# Patient Record
Sex: MALE | Race: WHITE | Employment: OTHER | URBAN - METROPOLITAN AREA
[De-identification: names, ages, dates, MRNs, and addresses within clinical notes are randomized per-mention and may not be internally consistent; named-entity substitution may affect disease eponyms.]

---

## 2017-07-06 ENCOUNTER — TELEPHONE (OUTPATIENT)
Dept: FAMILY MEDICINE | Facility: CLINIC | Age: 82
End: 2017-07-06

## 2017-07-07 ENCOUNTER — OFFICE VISIT (OUTPATIENT)
Dept: FAMILY MEDICINE | Facility: CLINIC | Age: 82
End: 2017-07-07
Payer: MEDICARE

## 2017-07-07 VITALS
HEART RATE: 80 BPM | TEMPERATURE: 97.6 F | OXYGEN SATURATION: 99 % | WEIGHT: 204 LBS | BODY MASS INDEX: 27.63 KG/M2 | HEIGHT: 72 IN | SYSTOLIC BLOOD PRESSURE: 144 MMHG | DIASTOLIC BLOOD PRESSURE: 104 MMHG

## 2017-07-07 DIAGNOSIS — R26.81 UNSTEADY GAIT: Primary | ICD-10-CM

## 2017-07-07 DIAGNOSIS — G47.33 OSA (OBSTRUCTIVE SLEEP APNEA): ICD-10-CM

## 2017-07-07 DIAGNOSIS — M62.81 GENERALIZED MUSCLE WEAKNESS: ICD-10-CM

## 2017-07-07 DIAGNOSIS — H53.2 DIPLOPIA: ICD-10-CM

## 2017-07-07 PROBLEM — I48.20 CHRONIC ATRIAL FIBRILLATION (H): Status: ACTIVE | Noted: 2017-07-07

## 2017-07-07 LAB
ANION GAP SERPL CALCULATED.3IONS-SCNC: 7 MMOL/L (ref 3–14)
BUN SERPL-MCNC: 15 MG/DL (ref 7–30)
CALCIUM SERPL-MCNC: 9.4 MG/DL (ref 8.5–10.1)
CHLORIDE SERPL-SCNC: 105 MMOL/L (ref 94–109)
CO2 SERPL-SCNC: 27 MMOL/L (ref 20–32)
CREAT SERPL-MCNC: 0.85 MG/DL (ref 0.66–1.25)
DIFFERENTIAL METHOD BLD: ABNORMAL
ERYTHROCYTE [DISTWIDTH] IN BLOOD BY AUTOMATED COUNT: 14.9 % (ref 10–15)
GFR SERPL CREATININE-BSD FRML MDRD: 86 ML/MIN/1.7M2
GLUCOSE SERPL-MCNC: 82 MG/DL (ref 70–99)
HCT VFR BLD AUTO: 44.9 % (ref 40–53)
HGB BLD-MCNC: 15 G/DL (ref 13.3–17.7)
LYMPHOCYTES # BLD AUTO: 7.2 10E9/L (ref 0.8–5.3)
LYMPHOCYTES NFR BLD AUTO: 54 %
MCH RBC QN AUTO: 32.5 PG (ref 26.5–33)
MCHC RBC AUTO-ENTMCNC: 33.4 G/DL (ref 31.5–36.5)
MCV RBC AUTO: 97 FL (ref 78–100)
MONOCYTES # BLD AUTO: 0.9 10E9/L (ref 0–1.3)
MONOCYTES NFR BLD AUTO: 7 %
NEUTROPHILS # BLD AUTO: 5.1 10E9/L (ref 1.6–8.3)
NEUTROPHILS NFR BLD AUTO: 39 %
PLATELET # BLD AUTO: 172 10E9/L (ref 150–450)
PLATELET # BLD EST: NORMAL 10*3/UL
POTASSIUM SERPL-SCNC: 4.4 MMOL/L (ref 3.4–5.3)
RBC # BLD AUTO: 4.61 10E12/L (ref 4.4–5.9)
SODIUM SERPL-SCNC: 139 MMOL/L (ref 133–144)
VIT B12 SERPL-MCNC: 199 PG/ML (ref 193–986)
WBC # BLD AUTO: 13.2 10E9/L (ref 4–11)

## 2017-07-07 PROCEDURE — 99203 OFFICE O/P NEW LOW 30 MIN: CPT | Performed by: INTERNAL MEDICINE

## 2017-07-07 PROCEDURE — 86780 TREPONEMA PALLIDUM: CPT | Performed by: INTERNAL MEDICINE

## 2017-07-07 PROCEDURE — 86255 FLUORESCENT ANTIBODY SCREEN: CPT | Mod: 90 | Performed by: INTERNAL MEDICINE

## 2017-07-07 PROCEDURE — 36415 COLL VENOUS BLD VENIPUNCTURE: CPT | Performed by: INTERNAL MEDICINE

## 2017-07-07 PROCEDURE — 82607 VITAMIN B-12: CPT | Performed by: INTERNAL MEDICINE

## 2017-07-07 PROCEDURE — 83516 IMMUNOASSAY NONANTIBODY: CPT | Mod: 59 | Performed by: INTERNAL MEDICINE

## 2017-07-07 PROCEDURE — 99000 SPECIMEN HANDLING OFFICE-LAB: CPT | Performed by: INTERNAL MEDICINE

## 2017-07-07 PROCEDURE — 85025 COMPLETE CBC W/AUTO DIFF WBC: CPT | Performed by: INTERNAL MEDICINE

## 2017-07-07 PROCEDURE — 83519 RIA NONANTIBODY: CPT | Mod: 90 | Performed by: INTERNAL MEDICINE

## 2017-07-07 PROCEDURE — 80048 BASIC METABOLIC PNL TOTAL CA: CPT | Performed by: INTERNAL MEDICINE

## 2017-07-07 RX ORDER — METOPROLOL TARTRATE 100 MG
TABLET ORAL
COMMUNITY
Start: 2016-12-17

## 2017-07-07 RX ORDER — DABIGATRAN ETEXILATE 150 MG/1
150 CAPSULE ORAL
COMMUNITY
Start: 2011-11-21 | End: 2017-11-08

## 2017-07-07 RX ORDER — ACETAMINOPHEN 325 MG/1
650 TABLET ORAL PRN
COMMUNITY
End: 2018-07-19

## 2017-07-07 RX ORDER — AMOXICILLIN 250 MG
4 CAPSULE ORAL DAILY PRN
COMMUNITY
End: 2018-09-18

## 2017-07-07 RX ORDER — AMOXICILLIN 250 MG/1
250 CAPSULE ORAL DAILY
COMMUNITY
End: 2019-06-19

## 2017-07-07 NOTE — PROGRESS NOTES
SUBJECTIVE:                                                    Bijan Daniel is a 82 year old male who presents to clinic today for the following health issues:  1.  Moved down from [place]  Dr. Pearce  Dizziness.  Unsteady in the gait.  Use the familia and walker   No physical therapy   No head injury or    No diabetes       [facility]  [facility] replacement in there ight shoulder could not determine the nature of the infections   Ate away bone   4 inches of the bone resected.    Amoxicillin infections in the shoulder and placed for amoxicillin    Walks slow   Double vision occasionally   Happens   Eye doctor seen  [place].   Went to retinal specialist       New Patient/Transfer of Care  Medication Followup    Taking Medication as prescribed: yes    Side Effects:  None    Medication Helping Symptoms:  yes           Problem list and histories reviewed & adjusted, as indicated.  Additional history: as documented    Patient Active Problem List   Diagnosis     Chronic atrial fibrillation (H)     MARI (obstructive sleep apnea)     Past Surgical History:   Procedure Laterality Date     APPENDECTOMY Right      AS TOTAL HIP ARTHROPLASTY Right 2008     AS TOTAL HIP ARTHROPLASTY Left 2009     C ANESTH,SHOULDER REPLACEMENT       removal shoulder arthroplasty         Social History   Substance Use Topics     Smoking status: Former Smoker     Smokeless tobacco: Not on file     Alcohol use Yes      Comment: 14 or less     History reviewed. No pertinent family history.      Current Outpatient Prescriptions   Medication Sig Dispense Refill     acetaminophen (TYLENOL) 325 MG tablet Take 650 mg by mouth as needed       amoxicillin (AMOXIL) 250 MG capsule Take 250 mg by mouth       calcium carb 1250 mg, 500 mg [place],/vitamin D 200 units (OSCAL WITH D) 500-200 MG-UNIT per tablet        dabigatran ANTICOAGULANT (PRADAXA) 150 MG capsule Take 150 mg by mouth       metoprolol (LOPRESSOR) 100 MG tablet TAKE 1 TABLET ORALLY  "TWICE DAILY       omeprazole (PRILOSEC) 20 MG CR capsule Take 20 mg by mouth       senna-docusate (SENOKOT-S;PERICOLACE) 8.6-50 MG per tablet        No Known Allergies  Recent Labs   Lab Test  09/01/11   0647  08/31/11   0910  07/17/09   0753   CR  0.81  0.91   --    GFRESTIMATED  >90  81   --    GFRESTBLACK  >90  >90   --    POTASSIUM  4.1  4.0  4.2   TSH   --    --   3.61        Reviewed and updated as needed this visit by clinical staff  Tobacco  Allergies  Meds  Med Hx  Surg Hx  Fam Hx  Soc Hx      Reviewed and updated as needed this visit by Provider         ROS:  Constitutional, HEENT, cardiovascular, pulmonary, gi and gu systems are negative, except as otherwise noted.    OBJECTIVE:     BP (!) 144/104 (BP Location: Right arm, Patient Position: Chair, Cuff Size: Adult Regular)  Pulse 80  Temp 97.6  F (36.4  C) (Oral)  Ht 5' 11.85\" (1.825 m)  Wt 204 lb (92.5 kg)  SpO2 99%  BMI 27.78 kg/m2  Body mass index is 27.78 kg/(m^2).  GENERAL: healthy, alert and no distress  NECK: no adenopathy, no asymmetry, masses, or scars and thyroid normal to palpation  RESP: lungs clear to auscultation - no rales, rhonchi or wheezes  CV: regular rate and rhythm, normal S1 S2, no S3 or S4, no murmur, click or rub, no peripheral edema and peripheral pulses strong  ABDOMEN: soft, nontender, no hepatosplenomegaly, no masses and bowel sounds normal  MS: no gross musculoskeletal defects noted, no edema    Diagnostic Test Results:  Results for orders placed or performed in visit on 09/03/11   CRP inflammation   Result Value Ref Range    CRP Inflammation 152.6 (H) 0.0 - 8.0 mg/L   INR   Result Value Ref Range    INR 1.89 (H) 0.86 - 1.14       ASSESSMENT/PLAN:       ICD-10-CM    1. Unsteady gait R26.81 Basic metabolic panel     Anti Treponema     Vitamin B12     CBC with platelets and differential     NEUROLOGY ADULT REFERRAL   2. MARI (obstructive sleep apnea) G47.33    3. Diplopia H53.2 MR Brain w/o Contrast   4. Generalized " muscle weakness M62.81 ACETYLCHOLINE RECEPTOR BINDING     STRIATED MUSCLE ANTIBODY IGG     ACETYLCHOLINE MODULATING ANTIBODY     ACETYLCHOLINE RECEPTOR BLOCKING ISIAH       RE: Bijan ABERNATHY Fredy  : 1934     Patient with a diagnosis of MARI (obstructive sleep apnea) requiring the use of a CPAP machine for treatment.   Please provide him with new CPAP supplies as necessary.     Oc Randall MD  [facility]

## 2017-07-07 NOTE — LETTER
[facility]  [address] [place]  [address]  [phone]        July 11, 2017    Bijan MICHELA Daniel  [address] [place] [address] [place]  [address]  [facility] [address]        Dear Bijan,    Labs look good.  No signs of muscle nerve problem.   Awaiting the imaging and the neurology assessment     Results for orders placed or performed in visit on 07/07/17   Basic metabolic panel   Result Value Ref Range    Sodium 139 133 - 144 mmol/L    Potassium 4.4 3.4 - 5.3 mmol/L    Chloride 105 94 - 109 mmol/L    Carbon Dioxide 27 20 - 32 mmol/L    Anion Gap 7 3 - 14 mmol/L    Glucose 82 70 - 99 mg/dL    Urea Nitrogen 15 7 - 30 mg/dL    Creatinine 0.85 0.66 - 1.25 mg/dL    GFR Estimate 86 >60 mL/min/1.7m2    GFR Estimate If Black >90   GFR Calc   >60 mL/min/1.7m2    Calcium 9.4 8.5 - 10.1 mg/dL   Anti Treponema   Result Value Ref Range    Treponema pallidum Antibody Negative NEG   Vitamin B12   Result Value Ref Range    Vitamin B12 199 193 - 986 pg/mL   CBC with platelets and differential   Result Value Ref Range    WBC 13.2 (H) 4.0 - 11.0 10e9/L    RBC Count 4.61 4.4 - 5.9 10e12/L    Hemoglobin 15.0 13.3 - 17.7 g/dL    Hematocrit 44.9 40.0 - 53.0 %    MCV 97 78 - 100 fl    MCH 32.5 26.5 - 33.0 pg    MCHC 33.4 31.5 - 36.5 g/dL    RDW 14.9 10.0 - 15.0 %    Platelet Count 172 150 - 450 10e9/L    % Neutrophils 39.0 %    % Lymphocytes 54.0 %    % Monocytes 7.0 %    Absolute Neutrophil 5.1 1.6 - 8.3 10e9/L    Absolute Lymphocytes 7.2 (H) 0.8 - 5.3 10e9/L    Absolute Monocytes 0.9 0.0 - 1.3 10e9/L    Platelet Estimate Normal     Diff Method Manual Differential    ACETYLCHOLINE RECEPTOR BINDING   Result Value Ref Range    AcetChol Binding Rakel 0.0    STRIATED MUSCLE ANTIBODY IGG   Result Value Ref Range    Striated Muscle Antibody IgG       <1:40  Reference range: <1:40  (Note)  Striated Muscle Antibodies, IgG are not detected. No  further testing will be performed.  INTERPRETIVE DATA:   Striated Muscle Antibodies, IgG Screen  In the presence of acetylcholine receptor (AChR) antibody,  striated muscle antibodies, which bind in a  cross-striational pattern to skeletal and heart muscle  tissue sections, are associated with late-onset myasthenia  gravis (MG). Striated muscle antibodies recognize epitopes  on three major muscle proteins, including: titin, ryanodine  receptor (RyR) and Kv1.4 (an alpha subunit of voltage-gated  potassium channel [VGKC]). Isolated cases of striated  muscle antibodies may be seen in patients with certain  autoimmune diseases, rheumatic fever, myocardial  infarction, and following some cardiotomy procedures.  Test developed and characteristics determined by [facility]. See Compliance Statement A: [employer]  Performed by [facility],  [address] [place] [address] [phone]   w.[employer], Nelson Thakkar MD, Lab. Director     ACETYLCHOLINE RECEPTOR BLOCKING ISIAH   Result Value Ref Range    AcetChol Block Isiah 15        If you have any questions please call the clinic at [phone].    Sincerely,    Oc ROJOL

## 2017-07-07 NOTE — NURSING NOTE
"Chief Complaint   Patient presents with     Establish Care     Recheck Medication     Health Maintenance     fall risk, PCV 13       Initial BP (!) 144/104 (BP Location: Right arm, Patient Position: Chair, Cuff Size: Adult Regular)  Pulse 80  Temp 97.6  F (36.4  C) (Oral)  Ht 5' 11.85\" (1.825 m)  Wt 204 lb (92.5 kg)  SpO2 99%  BMI 27.78 kg/m2 Estimated body mass index is 27.78 kg/(m^2) as calculated from the following:    Height as of this encounter: 5' 11.85\" (1.825 m).    Weight as of this encounter: 204 lb (92.5 kg).  Medication Reconciliation: complete   Jessica See YAMILE Gale      "

## 2017-07-07 NOTE — MR AVS SNAPSHOT
After Visit Summary   7/7/2017    Bijan Daniel    MRN: [id number]           Patient Information     Date Of Birth          8/12/1934        Visit Information        Provider Department      7/7/2017 11:40 AM Oc Randall MD [facility]        Today's Diagnoses     Unsteady gait    -  1    MARI (obstructive sleep apnea)        Diplopia        Generalized muscle weakness           Follow-ups after your visit        Additional Services     NEUROLOGY ADULT REFERRAL       Your provider has referred you to: [facility] [phone]   [email or web address]    Reason for Referral: Consult    Please be aware that coverage of these services is subject to the terms and limitations of your health insurance plan.  Call member services at your health plan with any benefit or coverage questions.      Please bring the following with you to your appointment:    (1) Any X-Rays, CTs or MRIs which have been performed.  Contact the facility where they were done to arrange for  prior to your scheduled appointment.    (2) List of current medications  (3) This referral request   (4) Any documents/labs given to you for this referral                  Future tests that were ordered for you today     Open Future Orders        Priority Expected Expires Ordered    MR Brain w/o Contrast Routine  7/7/2018 7/7/2017            Who to contact     If you have questions or need follow up information about today's clinic visit or your schedule please contact [facility] directly at [phone].  Normal or non-critical lab and imaging results will be communicated to you by MyChart, letter or phone within 4 business days after the clinic has received the results. If you do not hear from us within 7 days, please contact the clinic through MyChart or phone. If you have a critical or abnormal lab result, we will notify you by phone as soon as  "possible.  Submit refill requests through [employer] or call your pharmacy and they will forward the refill request to us. Please allow 3 business days for your refill to be completed.          Additional Information About Your Visit        [employer] Information     [employer] lets you send messages to your doctor, view your test results, renew your prescriptions, schedule appointments and more. To sign up, go to [email or web address] . Click on \"Log in\" on the left side of the screen, which will take you to the Welcome page. Then click on \"Sign up Now\" on the right side of the page.     You will be asked to enter the access code listed below, as well as some personal information. Please follow the directions to create your username and password.     Your access code is: [email or web address]  Expires: 10/5/2017 12:26 PM     Your access code will  in 90 days. If you need help or a new code, please call your [place] clinic or [phone].        Care EveryWhere ID     This is your Care EveryWhere ID. This could be used by other organizations to access your [place] medical records  [id number]        Your Vitals Were     Pulse Temperature Height Pulse Oximetry BMI (Body Mass Index)       80 97.6  F (36.4  C) (Oral) 5' 11.85\" (1.825 m) 99% 27.78 kg/m2        Blood Pressure from Last 3 Encounters:   17 (!) 144/104    Weight from Last 3 Encounters:   17 204 lb (92.5 kg)              We Performed the Following     ACETYLCHOLINE MODULATING ANTIBODY     ACETYLCHOLINE RECEPTOR BINDING     ACETYLCHOLINE RECEPTOR BLOCKING ISIAH     Anti Treponema     Basic metabolic panel     CBC with platelets and differential     NEUROLOGY ADULT REFERRAL     STRIATED MUSCLE ANTIBODY IGG     Vitamin B12        Primary Care Provider Office Phone # Fax #    Jumana Friedman [phone]       [address] [place] [address]        Equal Access to Services     RHONDA MCDONOUGH AH: Hadii " nael Meng, waroyer dixonadaha, qaybta kaalfranny christianoedvin, waxtom mirela edwardsfangalison samuel uday. So [facility] [phone].    ATENCIÓN: Si habla español, tiene a kwon disposición servicios gratuitos de asistencia lingüística. Cyndie al [phone].    We comply with applicable federal civil rights laws and [place] laws. We do not discriminate on the basis of race, color, national origin, age, disability sex, sexual orientation or gender identity.            Thank you!     Thank you for choosing [facility]  for your care. Our goal is always to provide you with excellent care. Hearing back from our patients is one way we can continue to improve our services. Please take a few minutes to complete the written survey that you may receive in the mail after your visit with us. Thank you!             Your Updated Medication List - Protect others around you: Learn how to safely use, store and throw away your medicines at www.disposemymeds.org.          This list is accurate as of: 7/7/17 12:26 PM.  Always use your most recent med list.                   Brand Name Dispense Instructions for use Diagnosis    acetaminophen 325 MG tablet    TYLENOL     Take 650 mg by mouth as needed        amoxicillin 250 MG capsule    AMOXIL     Take 250 mg by mouth        calcium carb 1250 mg (500 mg [place])/vitamin D 200 units 500-200 MG-UNIT per tablet    OSCAL with D          dabigatran ANTICOAGULANT 150 MG capsule    PRADAXA     Take 150 mg by mouth        metoprolol 100 MG tablet    LOPRESSOR     TAKE 1 TABLET ORALLY TWICE DAILY        omeprazole 20 MG CR capsule    priLOSEC     Take 20 mg by mouth        senna-docusate 8.6-50 MG per tablet    SENOKOT-S;PERICOLACE

## 2017-07-08 LAB
ACHR BIND AB SER-SCNC: 0 NMOL/L
STRIA MUS IGG SER QL IF: NORMAL
T PALLIDUM IGG+IGM SER QL: NEGATIVE

## 2017-07-10 LAB — ACHR BLOCK AB/ACHR TOTAL SFR SER: 15 %

## 2017-07-11 LAB — ACHR MOD AB/ACHR TOTAL SFR SER: 18 %

## 2017-07-13 ENCOUNTER — TELEPHONE (OUTPATIENT)
Dept: FAMILY MEDICINE | Facility: CLINIC | Age: 82
End: 2017-07-13

## 2017-07-13 NOTE — TELEPHONE ENCOUNTER
Forms received from: [employer]   Phone number listed: [phone]   Fax listed: [phone]  Date received: 7-13-17  Form description: CPap supplies  Once forms are completed, please return to [place] via fax.  Is patient requesting to be contacted when forms are completed: n/a  Form placed: provider desk  Mary Todd

## 2017-07-14 ENCOUNTER — RADIANT APPOINTMENT (OUTPATIENT)
Dept: MRI IMAGING | Facility: CLINIC | Age: 82
End: 2017-07-14
Attending: INTERNAL MEDICINE
Payer: MEDICARE

## 2017-07-14 DIAGNOSIS — H53.2 DIPLOPIA: ICD-10-CM

## 2017-07-14 PROCEDURE — 70551 MRI BRAIN STEM W/O DYE: CPT | Mod: TC

## 2017-07-25 ENCOUNTER — TELEPHONE (OUTPATIENT)
Dept: FAMILY MEDICINE | Facility: CLINIC | Age: 82
End: 2017-07-25

## 2017-07-25 NOTE — TELEPHONE ENCOUNTER
Reason for Call:  Other Letter    Detailed comments: [facility] is requesting letter that states patient needs/uses CPAP supplies.  Patient is not able to get supplies until this is done.  [facility] phone: [phone] Fax to [facility] is [phone]. Patient has been working with Isabel at [facility].    Phone Number Patient can be reached at: Home number on file [phone] (home) to reach patient.    Best Time: Any    Can we leave a detailed message on this number? YES    Call taken on 7/25/2017 at 3:40 PM by Tucker Grant

## 2017-07-25 NOTE — TELEPHONE ENCOUNTER
RN sees previous encounter for forms for CPAP from [facility], they need a note stating medical use.    Routed to PCP/covering pool.    Brandi Sim RN  [facility]

## 2017-07-25 NOTE — LETTER
[address] [address]  Phone: [phone]  Fax: [phone]    2017        Bijan Daniel  [address] [place] [address] [address]  [address]  [facility] [address]          To whom it may concern:    RE: Bijan ABERNATHY Fredy  : 1934    Patient with a diagnosis of MARI (obstructive sleep apnea) requiring the use of a CPAP machine for treatment.   Please provide him with new CPAP supplies as necessary.       Please contact me for questions or concerns.      Sincerely,        Grecia Vega PA-C

## 2017-07-26 NOTE — TELEPHONE ENCOUNTER
Cheri galeano from [facility] [place].  Medicare, patients insurance, will need what is in the letter to be addend to the (2-7-17 OV note.  Please fax the updated note to them at [phone].

## 2017-08-07 ENCOUNTER — TRANSFERRED RECORDS (OUTPATIENT)
Dept: HEALTH INFORMATION MANAGEMENT | Facility: CLINIC | Age: 82
End: 2017-08-07

## 2017-08-25 ENCOUNTER — TRANSFERRED RECORDS (OUTPATIENT)
Dept: HEALTH INFORMATION MANAGEMENT | Facility: CLINIC | Age: 82
End: 2017-08-25

## 2017-10-09 ENCOUNTER — TRANSFERRED RECORDS (OUTPATIENT)
Dept: HEALTH INFORMATION MANAGEMENT | Facility: CLINIC | Age: 82
End: 2017-10-09

## 2017-11-08 ENCOUNTER — OFFICE VISIT (OUTPATIENT)
Dept: FAMILY MEDICINE | Facility: CLINIC | Age: 82
End: 2017-11-08
Payer: MEDICARE

## 2017-11-08 VITALS
DIASTOLIC BLOOD PRESSURE: 90 MMHG | SYSTOLIC BLOOD PRESSURE: 134 MMHG | OXYGEN SATURATION: 96 % | HEART RATE: 89 BPM | TEMPERATURE: 96 F

## 2017-11-08 DIAGNOSIS — Z23 NEED FOR PROPHYLACTIC VACCINATION AND INOCULATION AGAINST INFLUENZA: ICD-10-CM

## 2017-11-08 DIAGNOSIS — F32.0 MILD MAJOR DEPRESSION (H): ICD-10-CM

## 2017-11-08 DIAGNOSIS — Z23 NEED FOR PNEUMOCOCCAL VACCINATION: Primary | ICD-10-CM

## 2017-11-08 DIAGNOSIS — E53.8 VITAMIN B12 DEFICIENCY (NON ANEMIC): ICD-10-CM

## 2017-11-08 PROCEDURE — 90662 IIV NO PRSV INCREASED AG IM: CPT | Performed by: INTERNAL MEDICINE

## 2017-11-08 PROCEDURE — 99214 OFFICE O/P EST MOD 30 MIN: CPT | Mod: 25 | Performed by: INTERNAL MEDICINE

## 2017-11-08 PROCEDURE — G0009 ADMIN PNEUMOCOCCAL VACCINE: HCPCS | Performed by: INTERNAL MEDICINE

## 2017-11-08 PROCEDURE — 90670 PCV13 VACCINE IM: CPT | Performed by: INTERNAL MEDICINE

## 2017-11-08 PROCEDURE — G0008 ADMIN INFLUENZA VIRUS VAC: HCPCS | Performed by: INTERNAL MEDICINE

## 2017-11-08 RX ORDER — VENLAFAXINE HYDROCHLORIDE 37.5 MG/1
37.5 CAPSULE, EXTENDED RELEASE ORAL DAILY
Qty: 90 CAPSULE | Refills: 3 | Status: SHIPPED | OUTPATIENT
Start: 2017-11-08 | End: 2017-12-20 | Stop reason: SINTOL

## 2017-11-08 ASSESSMENT — PATIENT HEALTH QUESTIONNAIRE - PHQ9: SUM OF ALL RESPONSES TO PHQ QUESTIONS 1-9: 7

## 2017-11-08 ASSESSMENT — PAIN SCALES - GENERAL: PAINLEVEL: NO PAIN (0)

## 2017-11-08 NOTE — NURSING NOTE
"Chief Complaint   Patient presents with     RECHECK     F/U from neurologist        Initial /90 (BP Location: Left arm, Patient Position: Chair, Cuff Size: Adult Regular)  Pulse 89  Temp 96  F (35.6  C) (Oral)  SpO2 96% Estimated body mass index is 27.78 kg/(m^2) as calculated from the following:    Height as of 7/7/17: 5' 11.85\" (1.825 m).    Weight as of 7/7/17: 204 lb (92.5 kg).  Medication Reconciliation: complete   Toña Cordero MA      "

## 2017-11-08 NOTE — NURSING NOTE
Prior to injection verified patient identity using patient's name and date of birth.  Toña Cordero MA    Per orders of Dr. Randall, injection of Flu and Pneumococcal 13 given by Toña Cordero. Patient instructed to remain in clinic for 15 minutes afterwards, and to report any adverse reaction to me immediately.  Toña Cordero MA

## 2017-11-08 NOTE — PROGRESS NOTES
SUBJECTIVE:   Bijan Daniel is a 83 year old male who presents to clinic today for the following health issues:    Follow up visit after neurology results.    ophthalmolgy and double vision is gone   Eyes get tired.    Working with dizziness and balance.      Is showing slow and steady improvement with physical therapy      Problem list and histories reviewed & adjusted, as indicated.  Additional history: as documented    Patient Active Problem List   Diagnosis     Chronic atrial fibrillation (H)     MARI (obstructive sleep apnea)     Past Surgical History:   Procedure Laterality Date     APPENDECTOMY Right      AS TOTAL HIP ARTHROPLASTY Right 2008     AS TOTAL HIP ARTHROPLASTY Left 2009     C ANESTH,SHOULDER REPLACEMENT       removal shoulder arthroplasty         Social History   Substance Use Topics     Smoking status: Former Smoker     Smokeless tobacco: Never Used     Alcohol use Yes      Comment: 14 or less     History reviewed. No pertinent family history.      Current Outpatient Prescriptions   Medication Sig Dispense Refill     Apixaban (ELIQUIS PO) Take 5 mg by mouth 2 times daily       venlafaxine (EFFEXOR-XR) 37.5 MG 24 hr capsule Take 1 capsule (37.5 mg) by mouth daily 90 capsule 3     acetaminophen (TYLENOL) 325 MG tablet Take 650 mg by mouth as needed       calcium carb 1250 mg, 500 mg [place],/vitamin D 200 units (OSCAL WITH D) 500-200 MG-UNIT per tablet        metoprolol (LOPRESSOR) 100 MG tablet TAKE 1 TABLET ORALLY TWICE DAILY       omeprazole (PRILOSEC) 20 MG CR capsule Take 20 mg by mouth       senna-docusate (SENOKOT-S;PERICOLACE) 8.6-50 MG per tablet        amoxicillin (AMOXIL) 250 MG capsule Take 250 mg by mouth       No Known Allergies  Recent Labs   Lab Test  07/07/17   1234  09/01/11   0647   CR  0.85  0.81   GFRESTIMATED  86  >90   GFRESTBLACK  >90   GFR Calc    >90   POTASSIUM  4.4  4.1            Reviewed and updated as needed this visit by clinical staff       Reviewed  and updated as needed this visit by Provider         ROS:  Constitutional, HEENT, cardiovascular, pulmonary, gi and gu systems are negative, except as otherwise noted.      OBJECTIVE:   /90 (BP Location: Left arm, Patient Position: Chair, Cuff Size: Adult Regular)  Pulse 89  Temp 96  F (35.6  C) (Oral)  SpO2 96%  There is no height or weight on file to calculate BMI.  GENERAL: healthy, alert and no distress  EYES: Eyes grossly normal to inspection, PERRL and conjunctivae and sclerae normal  HENT: ear canals and TM's normal, nose and mouth without ulcers or lesions  NECK: no adenopathy, no asymmetry, masses, or scars and thyroid normal to palpation  RESP: lungs clear to auscultation - no rales, rhonchi or wheezes  CV: regular rate and rhythm, normal S1 S2, no S3 or S4, no murmur, click or rub, no peripheral edema and peripheral pulses strong  ABDOMEN: soft, nontender, no hepatosplenomegaly, no masses and bowel sounds normal  MS: no gross musculoskeletal defects noted, no edema  SKIN: no suspicious lesions or rashes  NEURO: Normal strength and tone, mentation intact and speech normal  BACK: no CVA tenderness, no paralumbar tenderness    Diagnostic Test Results:  Results for orders placed or performed in visit on 07/14/17   MR Brain w/o Contrast    Narrative    MRI OF THE BRAIN WITHOUT CONTRAST 7/14/2017 1:06 PM     COMPARISON: None.    HISTORY: Diplopia     TECHNIQUE:   Brain: Axial diffusion-weighted with ADC map, T2-weighted with fat  saturation, T1-weighted and turboFLAIR and coronal T1-weighted images  of the brain were obtained without intravenous contrast.     FINDINGS: There is moderate diffuse cerebral volume loss. There are  patchy periventricular areas of abnormal T2 signal hyperintensity in  the cerebral white matter bilaterally that are consistent with sequela  of chronic small vessel ischemic disease.    The ventricles and basal cisterns are within normal limits in  configuration given the degree  of cerebral volume loss. There is no  midline shift. There are no extra-axial fluid collections. There is no  evidence for stroke or acute intracranial hemorrhage.    There is no sinusitis or mastoiditis.      Impression    IMPRESSION: Diffuse cerebral volume loss and cerebral white matter  changes consistent with chronic small vessel ischemic disease. No  evidence for acute intracranial pathology.    LISA BARTLETT MD       ASSESSMENT/PLAN:       ICD-10-CM    1. Need for pneumococcal vaccination Z23 PNEUMOCOCCAL CONJ VACCINE 13 VALENT IM     VACCINE ADMINISTRATION, INITIAL   2. Need for prophylactic vaccination and inoculation against influenza Z23 FLU VACCINE, INCREASED ANTIGEN, PRESV FREE, AGE 65+ [08798]     Vaccine Administration, Each Additional [23022]   3. Mild major depression (H) F32.0 venlafaxine (EFFEXOR-XR) 37.5 MG 24 hr capsule     **Lyme Disease Rakel with reflex to WB Serum FUTURE 14d   4. Vitamin B12 deficiency (non anemic) E53.8 **Vitamin B12 FUTURE 2mo         Improvement noted with ongoing PT and activitity increases    cotnine to follow b12 and patient wants lyme's testing      Oc Randall MD  [facility]  Injectable Influenza Immunization Documentation    1.  Is the person to be vaccinated sick today?   No    2. Does the person to be vaccinated have an allergy to a component   of the vaccine?   No  Egg Allergy Algorithm Link    3. Has the person to be vaccinated ever had a serious reaction   to influenza vaccine in the past?   No    4. Has the person to be vaccinated ever had Guillain-Barré syndrome?   No    Form completed by Toña Cordero MA

## 2017-11-08 NOTE — MR AVS SNAPSHOT
After Visit Summary   11/8/2017    Bijan Daniel    MRN: [id number]           Patient Information     Date Of Birth          8/12/1934        Visit Information        Provider Department      11/8/2017 12:00 PM Oc Randall MD [facility]        Today's Diagnoses     Need for pneumococcal vaccination    -  1    Need for prophylactic vaccination and inoculation against influenza        Mild major depression (H)        Vitamin B12 deficiency (non anemic)           Follow-ups after your visit        Follow-up notes from your care team     Return in about 6 weeks (around 12/20/2017).      Your next 10 appointments already scheduled     Dec 20, 2017 11:20 AM CST   SHORT with Oc Randall MD   [facility] ([facility])    [address] [place] [id number] [phone]              Future tests that were ordered for you today     Open Future Orders        Priority Expected Expires Ordered    **Vitamin B12 FUTURE 2mo Routine 1/7/2018 3/8/2018 11/8/2017    **Lyme Disease Rakel with reflex to WB Serum FUTURE 14d Routine 11/15/2017 11/22/2017 11/8/2017            Who to contact     If you have questions or need follow up information about today's clinic visit or your schedule please contact [facility] directly at [phone].  Normal or non-critical lab and imaging results will be communicated to you by MyChart, letter or phone within 4 business days after the clinic has received the results. If you do not hear from us within 7 days, please contact the clinic through MyChart or phone. If you have a critical or abnormal lab result, we will notify you by phone as soon as possible.  Submit refill requests through [employer] or call your pharmacy and they will forward the refill request to us. Please allow 3 business days for your refill to be completed.          Additional Information About  Your Visit        Puzlhar[employer]local Information     [employer] gives you secure access to your electronic health record. If you see a primary care provider, you can also send messages to your care team and make appointments. If you have questions, please call your primary care clinic.  If you do not have a primary care provider, please call [phone] and they will assist you.        Care EveryWhere ID     This is your Care EveryWhere ID. This could be used by other organizations to access your [place] medical records  [id number]        Your Vitals Were     Pulse Temperature Pulse Oximetry             89 96  F (35.6  C) (Oral) 96%          Blood Pressure from Last 3 Encounters:   11/08/17 134/90   07/07/17 (!) 144/104    Weight from Last 3 Encounters:   07/07/17 204 lb (92.5 kg)              We Performed the Following     FLU VACCINE, INCREASED ANTIGEN, PRESV FREE, AGE 65+ [09609]     PNEUMOCOCCAL CONJ VACCINE 13 VALENT IM     Vaccine Administration, Each Additional [09362]     VACCINE ADMINISTRATION, INITIAL          Today's Medication Changes          These changes are accurate as of: 11/8/17 12:45 PM.  If you have any questions, ask your nurse or doctor.               Start taking these medicines.        Dose/Directions    venlafaxine 37.5 MG 24 hr capsule   Commonly known as:  EFFEXOR-XR   Used for:  Mild major depression (H)   Started by:  Oc Randall MD        Dose:  37.5 mg   Take 1 capsule (37.5 mg) by mouth daily   Quantity:  90 capsule   Refills:  3         Stop taking these medicines if you haven't already. Please contact your care team if you have questions.     dabigatran ANTICOAGULANT 150 MG capsule   Commonly known as:  PRADAXA   Stopped by:  Oc Randall MD                Where to get your medicines      These medications were sent to [facility] [address] [place] [id number]     Phone:  [phone]     venlafaxine 37.5 MG 24 hr  capsule                Primary Care Provider Office Phone # Fax #    Oc Randall [phone]       [address] [facility] [address]        Equal Access to Services     RHONDA MCDONOUGH : Hadii aad ku hadserena Meng, waflorianda luqadaha, qaybta kabradenda kale, arlene cisneros christianonarcisa mazariegos lizzie frye. So [facility] [phone].    ATENCIÓN: Si habla español, tiene a kwon disposición servicios gratuitos de asistencia lingüística. Llame al [phone].    We comply with applicable federal civil rights laws and [place] laws. We do not discriminate on the basis of race, color, national origin, age, disability, sex, sexual orientation, or gender identity.            Thank you!     Thank you for choosing [facility]  for your care. Our goal is always to provide you with excellent care. Hearing back from our patients is one way we can continue to improve our services. Please take a few minutes to complete the written survey that you may receive in the mail after your visit with us. Thank you!             Your Updated Medication List - Protect others around you: Learn how to safely use, store and throw away your medicines at www.disposemymeds.org.          This list is accurate as of: 11/8/17 12:45 PM.  Always use your most recent med list.                   Brand Name Dispense Instructions for use Diagnosis    acetaminophen 325 MG tablet    TYLENOL     Take 650 mg by mouth as needed        amoxicillin 250 MG capsule    AMOXIL     Take 250 mg by mouth        Calcium carb-Vitamin D 500 mg [place]-200 units 500-200 MG-UNIT per tablet    OSCAL with D;Oyster Shell Calcium          ELIQUIS PO      Take 5 mg by mouth 2 times daily        metoprolol 100 MG tablet    LOPRESSOR     TAKE 1 TABLET ORALLY TWICE DAILY        omeprazole 20 MG CR capsule    priLOSEC     Take 20 mg by mouth        senna-docusate 8.6-50 MG per tablet    SENOKOT-S;PERICOLACE          venlafaxine 37.5 MG 24 hr  capsule    EFFEXOR-XR    90 capsule    Take 1 capsule (37.5 mg) by mouth daily    Mild major depression (H)

## 2017-12-01 ENCOUNTER — TELEPHONE (OUTPATIENT)
Dept: FAMILY MEDICINE | Facility: CLINIC | Age: 82
End: 2017-12-01

## 2017-12-01 NOTE — TELEPHONE ENCOUNTER
Ok to take effexor every other day for 1 week then stop.    See me within 3 weeks to review other alternative treatments

## 2017-12-01 NOTE — TELEPHONE ENCOUNTER
RN attempted to call patient, no answer, did leave detailed message as advised in original message but will leave in basket for follow up Monday if patient doesn't call back.    Brandi Sim RN  [facility]

## 2017-12-01 NOTE — TELEPHONE ENCOUNTER
Patient was seen by Dr. Randall 11/8/17, due back 12/20/17, is scheduled.    I see he has been worked up for diplopia this past summer.    I called patient to clarify; he states he saw the opthalmologist for the double vision and prisms were put in his glasses and the problem was solved.   He reports recurrence of double vision about a week after starting the effexor (started 11/8/17).      Denies headache, one-sided weakness, trouble talking, trouble swallowing.    States the vision is sharp but sees double.    I see blurry vision as possible adverse effect of effexor per [employer] online reference:   Ophthalmic: Blurred vision (4% to 6% )  Advised patient to wait on taking his AM dose until he hears back from Dr. Randall regarding this issue.    Routed to Dr. Randall to advise.    Mercedes Irvin RN  [facility]

## 2017-12-01 NOTE — TELEPHONE ENCOUNTER
Reason for Call:  Other call back    Detailed comments: patient states he is having blurry vision as a side effect from venlafaxine (EFFEXOR-XR) 37.5 MG 24 hr capsule. He would like to know if there is an alternative that he could take.    Phone Number Patient can be reached at: Home number on file [phone] (home)    Best Time: anytime    Can we leave a detailed message on this number? YES    Call taken on 12/1/2017 at 8:48 AM by Chela Sherwood

## 2017-12-04 NOTE — TELEPHONE ENCOUNTER
Attempt # 1  Called patient at home number.  Was call answered? Yes,  Patient had received the message and had not questions.     Anna Cuadra RN  [facility]

## 2017-12-20 ENCOUNTER — OFFICE VISIT (OUTPATIENT)
Dept: FAMILY MEDICINE | Facility: CLINIC | Age: 82
End: 2017-12-20
Payer: MEDICARE

## 2017-12-20 VITALS
OXYGEN SATURATION: 98 % | DIASTOLIC BLOOD PRESSURE: 78 MMHG | TEMPERATURE: 96.6 F | WEIGHT: 207 LBS | BODY MASS INDEX: 28.19 KG/M2 | SYSTOLIC BLOOD PRESSURE: 123 MMHG | HEART RATE: 79 BPM

## 2017-12-20 DIAGNOSIS — E53.8 VITAMIN B12 DEFICIENCY (NON ANEMIC): Primary | ICD-10-CM

## 2017-12-20 LAB — VIT B12 SERPL-MCNC: 840 PG/ML (ref 193–986)

## 2017-12-20 PROCEDURE — 82607 VITAMIN B-12: CPT | Performed by: INTERNAL MEDICINE

## 2017-12-20 PROCEDURE — 99213 OFFICE O/P EST LOW 20 MIN: CPT | Performed by: INTERNAL MEDICINE

## 2017-12-20 PROCEDURE — 36415 COLL VENOUS BLD VENIPUNCTURE: CPT | Performed by: INTERNAL MEDICINE

## 2017-12-20 RX ORDER — ESCITALOPRAM OXALATE 5 MG/1
2.5 TABLET ORAL DAILY
Qty: 90 TABLET | Refills: 3 | Status: SHIPPED | OUTPATIENT
Start: 2017-12-20 | End: 2018-06-27

## 2017-12-20 ASSESSMENT — ANXIETY QUESTIONNAIRES
IF YOU CHECKED OFF ANY PROBLEMS ON THIS QUESTIONNAIRE, HOW DIFFICULT HAVE THESE PROBLEMS MADE IT FOR YOU TO DO YOUR WORK, TAKE CARE OF THINGS AT HOME, OR GET ALONG WITH OTHER PEOPLE: NOT DIFFICULT AT ALL
3. WORRYING TOO MUCH ABOUT DIFFERENT THINGS: SEVERAL DAYS
1. FEELING NERVOUS, ANXIOUS, OR ON EDGE: NOT AT ALL
5. BEING SO RESTLESS THAT IT IS HARD TO SIT STILL: NOT AT ALL
7. FEELING AFRAID AS IF SOMETHING AWFUL MIGHT HAPPEN: NOT AT ALL
GAD7 TOTAL SCORE: 2
2. NOT BEING ABLE TO STOP OR CONTROL WORRYING: NOT AT ALL
6. BECOMING EASILY ANNOYED OR IRRITABLE: SEVERAL DAYS

## 2017-12-20 ASSESSMENT — PAIN SCALES - GENERAL: PAINLEVEL: NO PAIN (0)

## 2017-12-20 ASSESSMENT — PATIENT HEALTH QUESTIONNAIRE - PHQ9
5. POOR APPETITE OR OVEREATING: NOT AT ALL
SUM OF ALL RESPONSES TO PHQ QUESTIONS 1-9: 5

## 2017-12-20 NOTE — PROGRESS NOTES
SUBJECTIVE:   Bijan Daniel is a 83 year old male who presents to clinic today for the following health issues:      Depression Followup    Status since last visit: No Change according to patient, but according wife he was doing better.     See PHQ-9 for current symptoms.  Other associated symptoms: None    Complicating factors:   Significant life event:  No   Current substance abuse:  None  Anxiety or Panic symptoms:  No    Diplopia got worse on the medication  Neuro work-up labs and imaging negative to date      PHQ-9 Score and MyChart F/U Questions 11/8/2017   Total Score 7   Q9: Suicide Ideation Not at all     reviewed  PHQ-9  English  PHQ-9   Any Language  Suicide Assessment Five-step Evaluation and Treatment (SAFE-T)      Amount of exercise or physical activity: None    Problems taking medications regularly: No    Medication side effects: Effexor- Vision changes    Diet: regular (no restrictions)    Follow up B12 blood work  Dr. lloyd for double vision and get it corrected.   Was over it until taking the medication   Vision return within 1 week.           Problem list and histories reviewed & adjusted, as indicated.  Additional history: as documented    Patient Active Problem List   Diagnosis     Chronic atrial fibrillation (H)     MARI (obstructive sleep apnea)     Vitamin B12 deficiency (non anemic)     Past Surgical History:   Procedure Laterality Date     APPENDECTOMY Right      AS TOTAL HIP ARTHROPLASTY Right 2008     AS TOTAL HIP ARTHROPLASTY Left 2009     C ANESTH,SHOULDER REPLACEMENT       removal shoulder arthroplasty         Social History   Substance Use Topics     Smoking status: Former Smoker     Smokeless tobacco: Never Used     Alcohol use Yes      Comment: 14 or less     History reviewed. No pertinent family history.      Current Outpatient Prescriptions   Medication Sig Dispense Refill     escitalopram (LEXAPRO) 5 MG tablet Take 0.5 tablets (2.5 mg) by mouth daily 90 tablet 3     Apixaban  (ELIQUIS PO) Take 5 mg by mouth 2 times daily       acetaminophen (TYLENOL) 325 MG tablet Take 650 mg by mouth as needed       amoxicillin (AMOXIL) 250 MG capsule Take 250 mg by mouth       calcium carb 1250 mg, 500 mg Unga,/vitamin D 200 units (OSCAL WITH D) 500-200 MG-UNIT per tablet        metoprolol (LOPRESSOR) 100 MG tablet TAKE 1 TABLET ORALLY TWICE DAILY       omeprazole (PRILOSEC) 20 MG CR capsule Take 20 mg by mouth       senna-docusate (SENOKOT-S;PERICOLACE) 8.6-50 MG per tablet        Allergies   Allergen Reactions     Nuts Anaphylaxis     Effexor [Venlafaxine] Other (See Comments)     Seeing double     Recent Labs   Lab Test  07/07/17   1234  09/01/11   0647   CR  0.85  0.81   GFRESTIMATED  86  >90   GFRESTBLACK  >90   GFR Calc    >90   POTASSIUM  4.4  4.1            Reviewed and updated as needed this visit by clinical staffTobacco  Allergies  Meds  Med Hx  Surg Hx  Fam Hx  Soc Hx      Reviewed and updated as needed this visit by Provider         ROS:  Constitutional, HEENT, cardiovascular, pulmonary, gi and gu systems are negative, except as otherwise noted.      OBJECTIVE:   /78 (BP Location: Left arm, Patient Position: Chair, Cuff Size: Adult Regular)  Pulse 79  Temp 96.6  F (35.9  C) (Oral)  Wt 207 lb (93.9 kg)  SpO2 98%  BMI 28.19 kg/m2  Body mass index is 28.19 kg/(m^2).  GENERAL: healthy, alert and no distress  EYES: Eyes grossly normal to inspection, PERRL and conjunctivae and sclerae normal  HENT: ear canals and TM's normal, nose and mouth without ulcers or lesions  NECK: no adenopathy, no asymmetry, masses, or scars and thyroid normal to palpation  RESP: lungs clear to auscultation - no rales, rhonchi or wheezes  CV: regular rate and rhythm, normal S1 S2, no S3 or S4, no murmur, click or rub, no peripheral edema and peripheral pulses strong  ABDOMEN: soft, nontender, no hepatosplenomegaly, no masses and bowel sounds normal  MS: no gross musculoskeletal defects  noted, no edema  SKIN: no suspicious lesions or rashes  NEURO: Normal strength and tone, mentation intact and speech normal  BACK: no CVA tenderness, no paralumbar tenderness  PSYCH: mentation appears normal, affect normal/bright    Diagnostic Test Results:  Results for orders placed or performed in visit on 07/14/17   MR Brain w/o Contrast    Narrative    MRI OF THE BRAIN WITHOUT CONTRAST 7/14/2017 1:06 PM     COMPARISON: None.    HISTORY: Diplopia     TECHNIQUE:   Brain: Axial diffusion-weighted with ADC map, T2-weighted with fat  saturation, T1-weighted and turboFLAIR and coronal T1-weighted images  of the brain were obtained without intravenous contrast.     FINDINGS: There is moderate diffuse cerebral volume loss. There are  patchy periventricular areas of abnormal T2 signal hyperintensity in  the cerebral white matter bilaterally that are consistent with sequela  of chronic small vessel ischemic disease.    The ventricles and basal cisterns are within normal limits in  configuration given the degree of cerebral volume loss. There is no  midline shift. There are no extra-axial fluid collections. There is no  evidence for stroke or acute intracranial hemorrhage.    There is no sinusitis or mastoiditis.      Impression    IMPRESSION: Diffuse cerebral volume loss and cerebral white matter  changes consistent with chronic small vessel ischemic disease. No  evidence for acute intracranial pathology.    LISA BARTLETT MD       ASSESSMENT/PLAN:       ICD-10-CM    1. Vitamin B12 deficiency (non anemic) E53.8 DEPRESSION ACTION PLAN (DAP)     Vitamin B12     escitalopram (LEXAPRO) 5 MG tablet       2. Chronic diplopia.  Eyewear and ophthamology treatmetn have been working well.  Medications worsened  Try low dose lexapro with slow titration.    Reviewed with patient as a face to face evaluation the benefit from continuing CPAPA and face  Mask to treat daytime sleepiness, concentration and energy, and HTN/ afib strain on  the heart      Patient had sleep study and titration done at different institution need documentation        Oc Randall MD  [facility]

## 2017-12-20 NOTE — NURSING NOTE
"Chief Complaint   Patient presents with     Depression     RECHECK     B12 blood work       Initial /78 (BP Location: Left arm, Patient Position: Chair, Cuff Size: Adult Regular)  Pulse 79  Temp 96.6  F (35.9  C) (Oral)  Wt 207 lb (93.9 kg)  SpO2 98%  BMI 28.19 kg/m2 Estimated body mass index is 28.19 kg/(m^2) as calculated from the following:    Height as of 7/7/17: 5' 11.85\" (1.825 m).    Weight as of this encounter: 207 lb (93.9 kg).  Medication Reconciliation: complete  Toña Cordero MA      "

## 2017-12-20 NOTE — MR AVS SNAPSHOT
After Visit Summary   12/20/2017    Bijan Daniel    MRN: [id number]           Patient Information     Date Of Birth          8/12/1934        Visit Information        Provider Department      12/20/2017 11:20 AM Oc Randall MD [facility]        Today's Diagnoses     Vitamin B12 deficiency (non anemic)    -  1       Follow-ups after your visit        Who to contact     If you have questions or need follow up information about today's clinic visit or your schedule please contact [facility] directly at [phone].  Normal or non-critical lab and imaging results will be communicated to you by Stantumhart, letter or phone within 4 business days after the clinic has received the results. If you do not hear from us within 7 days, please contact the clinic through [employer] or phone. If you have a critical or abnormal lab result, we will notify you by phone as soon as possible.  Submit refill requests through [employer] or call your pharmacy and they will forward the refill request to us. Please allow 3 business days for your refill to be completed.          Additional Information About Your Visit        MyChart Information     [employer] gives you secure access to your electronic health record. If you see a primary care provider, you can also send messages to your care team and make appointments. If you have questions, please call your primary care clinic.  If you do not have a primary care provider, please call [phone] and they will assist you.        Care EveryWhere ID     This is your Care EveryWhere ID. This could be used by other organizations to access your [place] medical records  [id number]        Your Vitals Were     Pulse Temperature Pulse Oximetry BMI (Body Mass Index)          79 96.6  F (35.9  C) (Oral) 98% 28.19 kg/m2         Blood Pressure from Last 3 Encounters:   12/20/17 123/78   11/08/17 134/90   07/07/17 (!) 144/104    Weight from Last 3  Encounters:   12/20/17 207 lb (93.9 kg)   07/07/17 204 lb (92.5 kg)              We Performed the Following     DEPRESSION ACTION PLAN (DAP)     Vitamin B12          Today's Medication Changes          These changes are accurate as of: 12/20/17 11:45 AM.  If you have any questions, ask your nurse or doctor.               Start taking these medicines.        Dose/Directions    escitalopram 5 MG tablet   Commonly known as:  LEXAPRO   Used for:  Vitamin B12 deficiency (non anemic)   Started by:  Oc Randall MD        Dose:  2.5 mg   Take 0.5 tablets (2.5 mg) by mouth daily   Quantity:  90 tablet   Refills:  3         Stop taking these medicines if you haven't already. Please contact your care team if you have questions.     venlafaxine 37.5 MG 24 hr capsule   Commonly known as:  EFFEXOR-XR   Stopped by:  Oc Randall MD                Where to get your medicines      These medications were sent to [facility] [address] [place] [address]     Phone:  [phone]     escitalopram 5 MG tablet                Primary Care Provider Office Phone # Fax #    Oc Randall [phone]       [address] [facility] [address]        Equal Access to Services     RHONDA MCDONOUGH AH: Hadii nael ku hadasho Soomaali, waaxda luqadaha, qaybta kaalmada adeegyada, waxay ryliein hayelvinn edilberto frye. So [facility] [phone].    ATENCIÓN: Si habla español, tiene a kwon disposición servicios gratuitos de asistencia lingüística. Llame al [phone].    We comply with applicable federal civil rights laws and [place] laws. We do not discriminate on the basis of race, color, national origin, age, disability, sex, sexual orientation, or gender identity.            Thank you!     Thank you for choosing [facility]  for your care. Our goal is always to provide you with excellent care. Hearing back from our patients is  one way we can continue to improve our services. Please take a few minutes to complete the written survey that you may receive in the mail after your visit with us. Thank you!             Your Updated Medication List - Protect others around you: Learn how to safely use, store and throw away your medicines at www.disposemymeds.org.          This list is accurate as of: 12/20/17 11:45 AM.  Always use your most recent med list.                   Brand Name Dispense Instructions for use Diagnosis    acetaminophen 325 MG tablet    TYLENOL     Take 650 mg by mouth as needed        amoxicillin 250 MG capsule    AMOXIL     Take 250 mg by mouth        Calcium carb-Vitamin D 500 mg [place]-200 units 500-200 MG-UNIT per tablet    OSCAL with D;Oyster Shell Calcium          ELIQUIS PO      Take 5 mg by mouth 2 times daily        escitalopram 5 MG tablet    LEXAPRO    90 tablet    Take 0.5 tablets (2.5 mg) by mouth daily    Vitamin B12 deficiency (non anemic)       metoprolol 100 MG tablet    LOPRESSOR     TAKE 1 TABLET ORALLY TWICE DAILY        omeprazole 20 MG CR capsule    priLOSEC     Take 20 mg by mouth        senna-docusate 8.6-50 MG per tablet    SENOKOT-S;PERICOLACE

## 2017-12-21 ASSESSMENT — ANXIETY QUESTIONNAIRES: GAD7 TOTAL SCORE: 2

## 2018-01-30 ENCOUNTER — TELEPHONE (OUTPATIENT)
Dept: FAMILY MEDICINE | Facility: CLINIC | Age: 83
End: 2018-01-30

## 2018-01-30 NOTE — TELEPHONE ENCOUNTER
Forms received from: [facility]   Phone number listed: [phone]   Fax listed: [phone]  Date received: 1-30-18  Form description: CPap supplies  Once forms are completed, please return to [facility] via fax.  Is patient requesting to be contacted when forms are completed: n/a  Form placed: provider desk  Mary Todd

## 2018-01-30 NOTE — LETTER
[address] [address]  [phone]        March 14, 2018    Bijan Daniel  [address] [place] [address] [address]  [phone]  [facility] [address]              Dear Bijan Daniel    This is to remind you that your fasting lab work is due.    You may call our office at [phone] to schedule an appointment.    Please disregard this notice if you have already had your labs drawn or made an appointment.        Sincerely,        Oc Randall MD

## 2018-02-02 ENCOUNTER — MEDICAL CORRESPONDENCE (OUTPATIENT)
Dept: HEALTH INFORMATION MANAGEMENT | Facility: CLINIC | Age: 83
End: 2018-02-02

## 2018-02-20 ENCOUNTER — ALLIED HEALTH/NURSE VISIT (OUTPATIENT)
Dept: NURSING | Facility: CLINIC | Age: 83
End: 2018-02-20
Payer: MEDICARE

## 2018-02-20 DIAGNOSIS — H61.23 BILATERAL IMPACTED CERUMEN: Primary | ICD-10-CM

## 2018-02-20 PROCEDURE — 99207 ZZC NO CHARGE NURSE ONLY: CPT

## 2018-02-20 NOTE — PROGRESS NOTES
Bijan Daniel is a 83 year old male who presents in clinic for possible impacted cerumen.    SYMPTOMS:  Onset of symptoms: a long time ago with gradual onset and still present    Hx of cerumen impaction?   Yes  Hx of surgery or rupture?  No (if yes, huddle with provider)  OBJECTIVE:  Patient appears in no distress  HEENT: both sides ear canal partially occluded with cerumen. Patient wears hearing aides.      PLAN:  Cerumenosis is noted.  Wax is removed by syringing and manual debridement. Instructions for home care to prevent wax buildup are given, including OTC debrox    Tilt head sideways and instill 5-10 drops twice daily up to 4 days, tip of applicator should not enter ear canal; keep drops in ear for several minutes by keeping head tilted and placing cotton in ear.    NURSING PLAN: Nursing advice to patient home care, gentle flushes at home PRN, use of otc ear wax drops or mineral oil    RECOMMENDED DISPOSITION:  Home care advice - as above  Will comply with recommendation: Yes  Mercedes Irvin RN  [facility] [place]

## 2018-02-20 NOTE — MR AVS SNAPSHOT
After Visit Summary   2/20/2018    Bijan Daniel    MRN: [id number]           Patient Information     Date Of Birth          8/12/1934        Visit Information        Provider Department      2/20/2018 11:00 AM CP RN [facility]        Today's Diagnoses     Bilateral impacted cerumen    -  1       Follow-ups after your visit        Who to contact     If you have questions or need follow up information about today's clinic visit or your schedule please contact [facility] directly at [phone].  Normal or non-critical lab and imaging results will be communicated to you by MyChart, letter or phone within 4 business days after the clinic has received the results. If you do not hear from us within 7 days, please contact the clinic through [facility] [employer] or phone. If you have a critical or abnormal lab result, we will notify you by phone as soon as possible.  Submit refill requests through [facility] or call your pharmacy and they will forward the refill request to us. Please allow 3 business days for your refill to be completed.          Additional Information About Your Visit        MyChart Information     [facility] gives you secure access to your electronic health record. If you see a primary care provider, you can also send messages to your care team and make appointments. If you have questions, please call your primary care clinic.  If you do not have a primary care provider, please call [phone] and they will assist you.        Care EveryWhere ID     This is your Care EveryWhere ID. This could be used by other organizations to access your [place] medical records  [id number]         Blood Pressure from Last 3 Encounters:   12/20/17 123/78   11/08/17 134/90   07/07/17 (!) 144/104    Weight from Last 3 Encounters:   12/20/17 207 lb (93.9 kg)   07/07/17 204 lb (92.5 kg)              Today, you had the following     No orders found for display       Primary Care  Provider Office Phone # Fax #    Oc Randall [phone]       [address] [facility] [address]        Equal Access to Services     RHONDA MCDONOUGH : Hadii aad ku haddedralis Meng, carolinada destinybibianaha, rosemaryta kaabdiel henley, arlene pillaicaleb alvaradonarcisa mazariegos lizzie frye. So [facility] [phone].    ATENCIÓN: Si habla español, tiene a kwon disposición servicios gratuitos de asistencia lingüística. Llame al [phone].    We comply with applicable federal civil rights laws and [place] laws. We do not discriminate on the basis of race, color, national origin, age, disability, sex, sexual orientation, or gender identity.            Thank you!     Thank you for choosing [facility]  for your care. Our goal is always to provide you with excellent care. Hearing back from our patients is one way we can continue to improve our services. Please take a few minutes to complete the written survey that you may receive in the mail after your visit with us. Thank you!             Your Updated Medication List - Protect others around you: Learn how to safely use, store and throw away your medicines at www.disposemymeds.org.          This list is accurate as of 2/20/18 11:45 AM.  Always use your most recent med list.                   Brand Name Dispense Instructions for use Diagnosis    acetaminophen 325 MG tablet    TYLENOL     Take 650 mg by mouth as needed        amoxicillin 250 MG capsule    AMOXIL     Take 250 mg by mouth        Calcium carb-Vitamin D 500 mg [place]-200 units 500-200 MG-UNIT per tablet    OSCAL with D;Oyster Shell Calcium          ELIQUIS PO      Take 5 mg by mouth 2 times daily        escitalopram 5 MG tablet    LEXAPRO    90 tablet    Take 0.5 tablets (2.5 mg) by mouth daily    Vitamin B12 deficiency (non anemic)       metoprolol tartrate 100 MG tablet    LOPRESSOR     TAKE 1 TABLET ORALLY TWICE DAILY        omeprazole 20 MG CR capsule    priLOSEC     Take 20 mg  by mouth        senna-docusate 8.6-50 MG per tablet    SENOKOT-S;PERICOLACE

## 2018-03-07 PROBLEM — F32.1 MODERATE MAJOR DEPRESSION (H): Status: ACTIVE | Noted: 2018-03-07

## 2018-04-17 DIAGNOSIS — E53.8 VITAMIN B12 DEFICIENCY (NON ANEMIC): ICD-10-CM

## 2018-04-17 LAB — VIT B12 SERPL-MCNC: 844 PG/ML (ref 193–986)

## 2018-04-17 PROCEDURE — 82607 VITAMIN B-12: CPT | Performed by: INTERNAL MEDICINE

## 2018-04-17 PROCEDURE — 36415 COLL VENOUS BLD VENIPUNCTURE: CPT | Performed by: INTERNAL MEDICINE

## 2018-05-17 ENCOUNTER — OFFICE VISIT (OUTPATIENT)
Dept: OPHTHALMOLOGY | Facility: CLINIC | Age: 83
End: 2018-05-17
Payer: MEDICARE

## 2018-05-17 DIAGNOSIS — H53.2 DIPLOPIA: Primary | ICD-10-CM

## 2018-05-17 DIAGNOSIS — H52.4 PRESBYOPIA: ICD-10-CM

## 2018-05-17 PROCEDURE — 92002 INTRM OPH EXAM NEW PATIENT: CPT | Performed by: OPHTHALMOLOGY

## 2018-05-17 PROCEDURE — 92015 DETERMINE REFRACTIVE STATE: CPT | Mod: GY | Performed by: OPHTHALMOLOGY

## 2018-05-17 ASSESSMENT — VISUAL ACUITY
METHOD: SNELLEN - LINEAR
OS_CC+: -1
CORRECTION_TYPE: GLASSES
OD_CC: 20/20
OD_CC+: -1
OS_CC: 20/40

## 2018-05-17 ASSESSMENT — REFRACTION_MANIFEST
OD_SPHERE: -0.75
OS_ADD: +3.00
OD_ADD: +3.00
OS_CYLINDER: +0.75
OS_SPHERE: -0.25
OS_AXIS: 032
OD_CYLINDER: +0.75
OD_AXIS: 098

## 2018-05-17 ASSESSMENT — REFRACTION_WEARINGRX
OS_ADD: +3.00
OS_AXIS: 029
OD_VPRISM: 1BU
OD_SPHERE: -0.50
OD_ADD: +3.00
SPECS_TYPE: PAL
OD_CYLINDER: +0.25
OD_AXIS: 068
OS_SPHERE: -0.25
OS_CYLINDER: +0.75

## 2018-05-17 ASSESSMENT — SLIT LAMP EXAM - LIDS: COMMENTS: HIGH CREASE , 1+ PTOSIS

## 2018-05-17 ASSESSMENT — EXTERNAL EXAM - LEFT EYE: OS_EXAM: 3+ BROW PTOSIS

## 2018-05-17 ASSESSMENT — EXTERNAL EXAM - RIGHT EYE: OD_EXAM: 3+ BROW PTOSIS

## 2018-05-17 ASSESSMENT — REFRACTION_FINALRX
OD_VPRISM: 1 BU
OS_VPRISM: 2BO

## 2018-05-17 NOTE — Clinical Note
"    5/17/2018         RE: Bijan Daniel  [address] [place] [address] [place]  [address]  [place], [place]. \[facility].\"" [id number]        Dear Colleague,    Thank you for referring your patient, Bijan Daniel, to the [facility]. Please see a copy of my visit note below.     Current Eye Medications:  Refresh both eyes daily.       Subjective:  Patient is here for a second opinion.  He has been having intermittent horizontal diplopia for the past 2 years.  He is able to fuse in up gaze.  He has been followed by Dr. Watson whom has tried multiple combinations of prisms in his glasses (he is currently wearing \"one prism\" in his glasses (he has had up to 5 prisms in his glasses, but that made things worse).  No diplopia with reading.   History of cataract surgery both eyes.  History of bilateral blepharoplasty.      Objective:  See Ophthalmology Exam.       Assessment:      Plan:   See Patient Instructions.         Again, thank you for allowing me to participate in the care of your patient.        Sincerely,        Adrian Suggs MD  "

## 2018-05-17 NOTE — MR AVS SNAPSHOT
After Visit Summary   5/17/2018    Bijan Daniel    MRN: [id number]           Patient Information     Date Of Birth          8/12/1934        Visit Information        Provider Department      5/17/2018 12:45 PM Adrian Suggs MD [facility]        Today's Diagnoses     Presbyopia    -  1    Diplopia          Care Instructions    Glasses Rx given - optional   Return visit 2 months for an muscle check, intraocular pressure check.    Adrian Suggs M.D.  [phone]              Follow-ups after your visit        Who to contact     If you have questions or need follow up information about today's clinic visit or your schedule please contact [facility] directly at [phone].  Normal or non-critical lab and imaging results will be communicated to you by MyChart, letter or phone within 4 business days after the clinic has received the results. If you do not hear from us within 7 days, please contact the clinic through [facility] or phone. If you have a critical or abnormal lab result, we will notify you by phone as soon as possible.  Submit refill requests through [employer] or call your pharmacy and they will forward the refill request to us. Please allow 3 business days for your refill to be completed.          Additional Information About Your Visit        MyChart Information     [employer] gives you secure access to your electronic health record. If you see a primary care provider, you can also send messages to your care team and make appointments. If you have questions, please call your primary care clinic.  If you do not have a primary care provider, please call [phone] and they will assist you.        Care EveryWhere ID     This is your Care EveryWhere ID. This could be used by other organizations to access your [place] medical records  [id number]         Blood Pressure from Last 3 Encounters:   12/20/17 123/78   11/08/17 134/90   07/07/17 (!) 144/104    Weight  from Last 3 Encounters:   12/20/17 93.9 kg (207 lb)   07/07/17 92.5 kg (204 lb)              Today, you had the following     No orders found for display       Primary Care Provider Office Phone # Fax #    Oc Randall [phone]       [address] [facility] [address]        Equal Access to Services     [facility]: Hadii aad ku hadasho Soomaali, waaxda luqadaha, qaybta kaalmada adeegyada, waxay idiin hayaan adeeg khfangsh lalinnea . So [facility] [phone].    ATENCIÓN: Si habla español, tiene a kwon disposición servicios gratuitos de asistencia lingüística. Llame al [phone].    We comply with applicable federal civil rights laws and [place] laws. We do not discriminate on the basis of race, color, national origin, age, disability, sex, sexual orientation, or gender identity.            Thank you!     Thank you for choosing [facility]  for your care. Our goal is always to provide you with excellent care. Hearing back from our patients is one way we can continue to improve our services. Please take a few minutes to complete the written survey that you may receive in the mail after your visit with us. Thank you!             Your Updated Medication List - Protect others around you: Learn how to safely use, store and throw away your medicines at www.disposemymeds.org.          This list is accurate as of 5/17/18  2:16 PM.  Always use your most recent med list.                   Brand Name Dispense Instructions for use Diagnosis    acetaminophen 325 MG tablet    TYLENOL     Take 650 mg by mouth as needed        amoxicillin 250 MG capsule    AMOXIL     Take 250 mg by mouth        Calcium carb-Vitamin D 500 mg [place]-200 units 500-200 MG-UNIT per tablet    OSCAL with D;Oyster Shell Calcium          ELIQUIS PO      Take 5 mg by mouth 2 times daily        escitalopram 5 MG tablet    LEXAPRO    90 tablet    Take 0.5 tablets (2.5 mg) by mouth daily    Vitamin B12 deficiency  (non anemic)       metoprolol tartrate 100 MG tablet    LOPRESSOR     TAKE 1 TABLET ORALLY TWICE DAILY        omeprazole 20 MG CR capsule    priLOSEC     Take 20 mg by mouth        senna-docusate 8.6-50 MG per tablet    SENOKOT-S;PERICOLACE

## 2018-05-17 NOTE — PATIENT INSTRUCTIONS
Glasses Rx given - optional   Return visit 2 months for an muscle check, intraocular pressure check.    Adrian Suggs M.D.  [phone]

## 2018-05-17 NOTE — PROGRESS NOTES
" Current Eye Medications:  Refresh both eyes daily.       Subjective:  Patient is here for a second opinion.  He has been having intermittent horizontal diplopia for the past 2 years.  He is able to fuse in up gaze.  He has been followed by Dr. Watson whom has tried multiple combinations of prisms in his glasses (he is currently wearing \"one prism\" in his glasses (he has had up to 5 prisms in his glasses, but that made things worse).  No diplopia with reading.   History of cataract surgery both eyes.  History of bilateral blepharoplasty.      Objective:  See Ophthalmology Exam.       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      Plan:  Glasses Rx given - optional   Return visit 2 months for an muscle check, intraocular pressure check.    Adrian Suggs M.D.  [phone]    NB:  Review of [facility] notes shows had MG panel and brain MRI which were unremarkable.      "

## 2018-06-06 ENCOUNTER — TRANSFERRED RECORDS (OUTPATIENT)
Dept: HEALTH INFORMATION MANAGEMENT | Facility: CLINIC | Age: 83
End: 2018-06-06

## 2018-06-21 ENCOUNTER — TELEPHONE (OUTPATIENT)
Dept: FAMILY MEDICINE | Facility: CLINIC | Age: 83
End: 2018-06-21

## 2018-06-21 NOTE — TELEPHONE ENCOUNTER
He needs to be seen to change a depression medication. Hasn't been seen in 6 months. Should discuss why the lexapro isn't working and re-discuss the previous side effects.   Grecia Vega PA-C

## 2018-06-21 NOTE — TELEPHONE ENCOUNTER
"I see \"effexor-vision changes\" noted in PCP notes at 12/20/17 visit.    PHQ-9 score:    PHQ-9 SCORE 12/20/2017   Total Score 5       I called patient who reports he stopped the lexapro as he did not feel it made any difference.   Denies suicidal ideation, states he feels about the same off it as he did on it.   I advised him he needs to see provider to discuss this, scheduled for 6/27 at 1:40.     Patient verbalized understanding of and agreement with plan.    Mercedes Irvin RN  [facility]                "

## 2018-06-21 NOTE — TELEPHONE ENCOUNTER
Routing to PCP to review and advise.    pharmacy cued.    Anna Cuadra RN  [facility]

## 2018-06-21 NOTE — TELEPHONE ENCOUNTER
Reason for call:  Patient reporting a symptom    Symptom or request: patient calling stating that he was prescribed a depression pill, he is not sure what it is called. But, he is reporting that he was seeing double vision while taking it. In the meantime he said that he went to an eye doctor and he isn't having double vision anymore. The patient is wondering if he can go back on the first prescription for depression to see if his vision will still be ok. Patient prefers the [facility] pharmacy in [place].    Duration (how long have symptoms been present): patient states on and off for 5 years     Have you been treated for this before? Yes    Additional comments: please advise patient and inform him what medication will be prescribed    Phone Number patient can be reached at:  Home number on file [phone] (home)    Best Time:  Any     Can we leave a detailed message on this number:  YES    Call taken on 6/21/2018 at 12:26 PM by ELVIE ALMEIDA

## 2018-06-27 ENCOUNTER — OFFICE VISIT (OUTPATIENT)
Dept: FAMILY MEDICINE | Facility: CLINIC | Age: 83
End: 2018-06-27
Payer: MEDICARE

## 2018-06-27 VITALS
DIASTOLIC BLOOD PRESSURE: 82 MMHG | SYSTOLIC BLOOD PRESSURE: 128 MMHG | TEMPERATURE: 97 F | HEART RATE: 79 BPM | OXYGEN SATURATION: 100 %

## 2018-06-27 DIAGNOSIS — F32.0 MILD MAJOR DEPRESSION (H): ICD-10-CM

## 2018-06-27 DIAGNOSIS — N40.1 BENIGN PROSTATIC HYPERPLASIA WITH LOWER URINARY TRACT SYMPTOMS, SYMPTOM DETAILS UNSPECIFIED: ICD-10-CM

## 2018-06-27 DIAGNOSIS — J30.0 CHRONIC VASOMOTOR RHINITIS: Primary | ICD-10-CM

## 2018-06-27 DIAGNOSIS — E78.5 HYPERLIPIDEMIA LDL GOAL <100: ICD-10-CM
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
## 2018-06-27 DIAGNOSIS — Z12.5 SPECIAL SCREENING FOR MALIGNANT NEOPLASM OF PROSTATE: ICD-10-CM

## 2018-06-27 LAB
ANION GAP SERPL CALCULATED.3IONS-SCNC: 6 MMOL/L (ref 3–14)
BUN SERPL-MCNC: 14 MG/DL (ref 7–30)
CALCIUM SERPL-MCNC: 9.4 MG/DL (ref 8.5–10.1)
CHLORIDE SERPL-SCNC: 102 MMOL/L (ref 94–109)
CHOLEST SERPL-MCNC: 166 MG/DL
CO2 SERPL-SCNC: 28 MMOL/L (ref 20–32)
CREAT SERPL-MCNC: 1.03 MG/DL (ref 0.66–1.25)
GFR SERPL CREATININE-BSD FRML MDRD: 69 ML/MIN/1.7M2
GLUCOSE SERPL-MCNC: 82 MG/DL (ref 70–99)
HDLC SERPL-MCNC: 57 MG/DL
LDLC SERPL CALC-MCNC: 90 MG/DL
NONHDLC SERPL-MCNC: 109 MG/DL
POTASSIUM SERPL-SCNC: 4.7 MMOL/L (ref 3.4–5.3)
PSA SERPL-ACNC: 0.27 UG/L (ref 0–4)
SODIUM SERPL-SCNC: 136 MMOL/L (ref 133–144)
TRIGL SERPL-MCNC: 95 MG/DL

## 2018-06-27 PROCEDURE — 36415 COLL VENOUS BLD VENIPUNCTURE: CPT | Performed by: INTERNAL MEDICINE

## 2018-06-27 PROCEDURE — 86618 LYME DISEASE ANTIBODY: CPT | Performed by: INTERNAL MEDICINE

## 2018-06-27 PROCEDURE — 99214 OFFICE O/P EST MOD 30 MIN: CPT | Performed by: INTERNAL MEDICINE

## 2018-06-27 PROCEDURE — 80048 BASIC METABOLIC PNL TOTAL CA: CPT | Performed by: INTERNAL MEDICINE

## 2018-06-27 PROCEDURE — 80061 LIPID PANEL: CPT | Performed by: INTERNAL MEDICINE

## 2018-06-27 PROCEDURE — G0103 PSA SCREENING: HCPCS | Performed by: INTERNAL MEDICINE

## 2018-06-27 RX ORDER — IPRATROPIUM BROMIDE 42 UG/1
2 SPRAY, METERED NASAL 4 TIMES DAILY PRN
Qty: 1 BOX | Refills: 1 | Status: SHIPPED | OUTPATIENT
Start: 2018-06-27 | End: 2018-07-31

## 2018-06-27 RX ORDER — VENLAFAXINE HYDROCHLORIDE 37.5 MG/1
37.5 CAPSULE, EXTENDED RELEASE ORAL DAILY
Qty: 90 CAPSULE | Refills: 3 | Status: SHIPPED | OUTPATIENT
Start: 2018-06-27 | End: 2020-04-01

## 2018-06-27 ASSESSMENT — ANXIETY QUESTIONNAIRES
2. NOT BEING ABLE TO STOP OR CONTROL WORRYING: SEVERAL DAYS
1. FEELING NERVOUS, ANXIOUS, OR ON EDGE: NOT AT ALL
7. FEELING AFRAID AS IF SOMETHING AWFUL MIGHT HAPPEN: NOT AT ALL
6. BECOMING EASILY ANNOYED OR IRRITABLE: SEVERAL DAYS
IF YOU CHECKED OFF ANY PROBLEMS ON THIS QUESTIONNAIRE, HOW DIFFICULT HAVE THESE PROBLEMS MADE IT FOR YOU TO DO YOUR WORK, TAKE CARE OF THINGS AT HOME, OR GET ALONG WITH OTHER PEOPLE: SOMEWHAT DIFFICULT
5. BEING SO RESTLESS THAT IT IS HARD TO SIT STILL: NOT AT ALL
GAD7 TOTAL SCORE: 2
3. WORRYING TOO MUCH ABOUT DIFFERENT THINGS: NOT AT ALL

## 2018-06-27 ASSESSMENT — PAIN SCALES - GENERAL: PAINLEVEL: NO PAIN (0)

## 2018-06-27 ASSESSMENT — PATIENT HEALTH QUESTIONNAIRE - PHQ9: 5. POOR APPETITE OR OVEREATING: NOT AT ALL

## 2018-06-27 NOTE — PROGRESS NOTES
SUBJECTIVE:   Bijan Daniel is a 83 year old male who presents to clinic today for the following health issues:    Depression Followup    Status since last visit: No change    See PHQ-9 for current symptoms.  Other associated symptoms: None    Complicating factors:   Significant life event:  No   Current substance abuse:  None  Anxiety or Panic symptoms:  No    PHQ-9 11/8/2017 12/20/2017   Total Score 7 5   Q9: Suicide Ideation Not at all Not at all   feels like he would like to go back on medicationf  PHQ-9  English  PHQ-9   Any Language  Suicide Assessment Five-step Evaluation and Treatment (SAFE-T)      Amount of exercise or physical activity: None    Problems taking medications regularly: No    Medication side effects: none    Diet: regular (no restrictions)    Same effect  On the double vision  Gave a new prescription   3 prisms in the right and 2 in the right   [place].    Improvement with the first medication.   Try something    Does a lot of coughing.   Not congestion in the throat   Sinus           Problem list and histories reviewed & adjusted, as indicated.  Additional history: as documented    Patient Active Problem List   Diagnosis     Chronic atrial fibrillation (H)     MARI (obstructive sleep apnea)     Vitamin B12 deficiency (non anemic)     Moderate major depression (H)     Past Surgical History:   Procedure Laterality Date     APPENDECTOMY Right      AS TOTAL HIP ARTHROPLASTY Right 2008     AS TOTAL HIP ARTHROPLASTY Left 2009     C ANESTH,SHOULDER REPLACEMENT       removal shoulder arthroplasty         Social History   Substance Use Topics     Smoking status: Former Smoker     Smokeless tobacco: Never Used     Alcohol use Yes      Comment: 14 or less     History reviewed. No pertinent family history.      Current Outpatient Prescriptions   Medication Sig Dispense Refill     acetaminophen (TYLENOL) 325 MG tablet Take 650 mg by mouth as needed       amoxicillin (AMOXIL) 250 MG capsule Take 250 mg  by mouth       Apixaban (ELIQUIS PO) Take 5 mg by mouth 2 times daily       calcium carb 1250 mg, 500 mg [place],/vitamin D 200 units (OSCAL WITH D) 500-200 MG-UNIT per tablet        ipratropium (ATROVENT) 0.06 % spray Spray 2 sprays into both nostrils 4 times daily as needed for rhinitis 1 Box 1     metoprolol (LOPRESSOR) 100 MG tablet TAKE 1 TABLET ORALLY TWICE DAILY       senna-docusate (SENOKOT-S;PERICOLACE) 8.6-50 MG per tablet        venlafaxine (EFFEXOR-XR) 37.5 MG 24 hr capsule Take 1 capsule (37.5 mg) by mouth daily 90 capsule 3     omeprazole (PRILOSEC) 20 MG CR capsule Take 20 mg by mouth       Allergies   Allergen Reactions     Nuts Anaphylaxis     Recent Labs   Lab Test  06/27/18   1420  07/07/17   1234   LDL  90   --    HDL  57   --    TRIG  95   --    CR  1.03  0.85   GFRESTIMATED  69  86   GFRESTBLACK  83  >90   GFR Calc     POTASSIUM  4.7  4.4        Reviewed and updated as needed this visit by clinical staff       Reviewed and updated as needed this visit by Provider         ROS:  Constitutional, HEENT, cardiovascular, pulmonary, gi and gu systems are negative, except as otherwise noted.    OBJECTIVE:     /82 (BP Location: Left arm, Patient Position: Chair, Cuff Size: Adult Regular)  Pulse 79  Temp 97  F (36.1  C) (Oral)  SpO2 100%  There is no height or weight on file to calculate BMI.  GENERAL: healthy, alert and no distress  EYES: Eyes grossly normal to inspection, PERRL and conjunctivae and sclerae normal  HENT: ear canals and TM's normal, nose and mouth without ulcers or lesions  NECK: no adenopathy, no asymmetry, masses, or scars and thyroid normal to palpation  RESP: lungs clear to auscultation - no rales, rhonchi or wheezes  CV: regular rate and rhythm, normal S1 S2, no S3 or S4, no murmur, click or rub, no peripheral edema and peripheral pulses strong  ABDOMEN: soft, nontender, no hepatosplenomegaly, no masses and bowel sounds normal  MS: no gross musculoskeletal  defects noted, no edema  SKIN: no suspicious lesions or rashes  NEURO: Normal strength and tone, mentation intact and speech normal  BACK: no CVA tenderness, no paralumbar tenderness  PSYCH: mentation appears normal, affect normal/bright  LYMPH: no cervical, supraclavicular, axillary, or inguinal adenopathy    Diagnostic Test Results:  Results for orders placed or performed in visit on 06/27/18   PSA, screen   Result Value Ref Range    PSA 0.27 0 - 4 ug/L   Basic metabolic panel   Result Value Ref Range    Sodium 136 133 - 144 mmol/L    Potassium 4.7 3.4 - 5.3 mmol/L    Chloride 102 94 - 109 mmol/L    Carbon Dioxide 28 20 - 32 mmol/L    Anion Gap 6 3 - 14 mmol/L    Glucose 82 70 - 99 mg/dL    Urea Nitrogen 14 7 - 30 mg/dL    Creatinine 1.03 0.66 - 1.25 mg/dL    GFR Estimate 69 >60 mL/min/1.7m2    GFR Estimate If Black 83 >60 mL/min/1.7m2    Calcium 9.4 8.5 - 10.1 mg/dL   Lipid panel reflex to direct LDL Non-fasting   Result Value Ref Range    Cholesterol 166 <200 mg/dL    Triglycerides 95 <150 mg/dL    HDL Cholesterol 57 >39 mg/dL    LDL Cholesterol Calculated 90 <100 mg/dL    Non HDL Cholesterol 109 <130 mg/dL       ASSESSMENT/PLAN:       ICD-10-CM    1. 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 Mild major depression (H) F32.0 venlafaxine (EFFEXOR-XR) 37.5 MG 24 hr capsule   3. Hyperlipidemia LDL goal <100 E78.5 Lipid panel reflex to direct LDL Non-fasting   4. Benign prostatic hyperplasia with lower urinary tract symptoms, symptom details unspecified N40.1    5. Tick bite, initial encounter W57.XXXA Lyme Disease Rakel with reflex to WB Serum   6. Special screening for malignant neoplasm of prostate Z12.5 PSA, screen           Oc Randall MD  [facility]

## 2018-06-27 NOTE — MR AVS SNAPSHOT
After Visit Summary   6/27/2018    Bijan Daniel    MRN: [id number]           Patient Information     Date Of Birth          8/12/1934        Visit Information        Provider Department      6/27/2018 1:40 PM Oc Randall MD [facility]        Today's Diagnoses     Chronic vasomotor rhinitis    -  1    Mild major depression (H)        Hyperlipidemia LDL goal <100        Benign prostatic hyperplasia with lower urinary tract symptoms, symptom details unspecified        Tick bite, initial encounter        Special screening for malignant neoplasm of prostate           Follow-ups after your visit        Who to contact     If you have questions or need follow up information about today's clinic visit or your schedule please contact [facility] directly at [phone].  Normal or non-critical lab and imaging results will be communicated to you by Concuityhart, letter or phone within 4 business days after the clinic has received the results. If you do not hear from us within 7 days, please contact the clinic through Concuityhart or phone. If you have a critical or abnormal lab result, we will notify you by phone as soon as possible.  Submit refill requests through Abakan or call your pharmacy and they will forward the refill request to us. Please allow 3 business days for your refill to be completed.          Additional Information About Your Visit        MyChart Information     Abakan gives you secure access to your electronic health record. If you see a primary care provider, you can also send messages to your care team and make appointments. If you have questions, please call your primary care clinic.  If you do not have a primary care provider, please call [phone] and they will assist you.        Care EveryWhere ID     This is your Care EveryWhere ID. This could be used by other organizations to access your [facility] medical records  [id number]        Your  Vitals Were     Pulse Temperature Pulse Oximetry             79 97  F (36.1  C) (Oral) 100%          Blood Pressure from Last 3 Encounters:   06/27/18 (!) 129/91   12/20/17 123/78   11/08/17 134/90    Weight from Last 3 Encounters:   12/20/17 207 lb (93.9 kg)   07/07/17 204 lb (92.5 kg)              We Performed the Following     Basic metabolic panel     Lipid panel reflex to direct LDL Non-fasting     Lyme Disease Rakel with reflex to WB Serum     PSA, screen          Today's Medication Changes          These changes are accurate as of 6/27/18  2:08 PM.  If you have any questions, ask your nurse or doctor.               Start taking these medicines.        Dose/Directions    ipratropium 0.06 % spray   Commonly known as:  ATROVENT   Used for:  Chronic vasomotor rhinitis   Started by:  Oc Randall MD        Dose:  2 spray   Spray 2 sprays into both nostrils 4 times daily as needed for rhinitis   Quantity:  1 Box   Refills:  1       venlafaxine 37.5 MG 24 hr capsule   Commonly known as:  EFFEXOR-XR   Used for:  Mild major depression (H)   Started by:  Oc Randall MD        Dose:  37.5 mg   Take 1 capsule (37.5 mg) by mouth daily   Quantity:  90 capsule   Refills:  3         Stop taking these medicines if you haven't already. Please contact your care team if you have questions.     escitalopram 5 MG tablet   Commonly known as:  LEXAPRO   Stopped by:  Oc Randall MD                Where to get your medicines      These medications were sent to [facility] [address] [place] [id number]     Phone:  [phone]     ipratropium 0.06 % spray    venlafaxine 37.5 MG 24 hr capsule                Primary Care Provider Office Phone # Fax #    Oc Randall [phone]       [address] [facility] [address]        Equal Access to Services     RHONDA MCDONOUGH AH: connor Morris, rubens  arlene sarah rosascaleb alvaradonarcisa samuel ah. [facility] [phone].    ATENCIÓN: Si makayla goldsmith, tiene a kwon disposición servicios gratuitos de asistencia lingüística. Cyndie al [phone].    We comply with applicable federal civil rights laws and [place] laws. We do not discriminate on the basis of race, color, national origin, age, disability, sex, sexual orientation, or gender identity.            Thank you!     Thank you for choosing [facility]  for your care. Our goal is always to provide you with excellent care. Hearing back from our patients is one way we can continue to improve our services. Please take a few minutes to complete the written survey that you may receive in the mail after your visit with us. Thank you!             Your Updated Medication List - Protect others around you: Learn how to safely use, store and throw away your medicines at www.disposemymeds.org.          This list is accurate as of 6/27/18  2:08 PM.  Always use your most recent med list.                   Brand Name Dispense Instructions for use Diagnosis    acetaminophen 325 MG tablet    TYLENOL     Take 650 mg by mouth as needed        amoxicillin 250 MG capsule    AMOXIL     Take 250 mg by mouth        Calcium carb-Vitamin D 500 mg [place]-200 units 500-200 MG-UNIT per tablet    OSCAL with D;Oyster Shell Calcium          ELIQUIS PO      Take 5 mg by mouth 2 times daily        ipratropium 0.06 % spray    ATROVENT    1 Box    Spray 2 sprays into both nostrils 4 times daily as needed for rhinitis    Chronic vasomotor rhinitis       metoprolol tartrate 100 MG tablet    LOPRESSOR     TAKE 1 TABLET ORALLY TWICE DAILY        omeprazole 20 MG CR capsule    priLOSEC     Take 20 mg by mouth        senna-docusate 8.6-50 MG per tablet    SENOKOT-S;PERICOLACE          venlafaxine 37.5 MG 24 hr capsule    EFFEXOR-XR    90 capsule    Take 1 capsule (37.5 mg) by mouth daily    Mild major depression (H)

## 2018-06-28 LAB — B BURGDOR IGG+IGM SER QL: 0.86 (ref 0–0.89)

## 2018-06-28 ASSESSMENT — ANXIETY QUESTIONNAIRES: GAD7 TOTAL SCORE: 2

## 2018-06-28 ASSESSMENT — PATIENT HEALTH QUESTIONNAIRE - PHQ9: SUM OF ALL RESPONSES TO PHQ QUESTIONS 1-9: 6

## 2018-07-16 ENCOUNTER — RECORDS - HEALTHEAST (OUTPATIENT)
Dept: LAB | Facility: CLINIC | Age: 83
End: 2018-07-16

## 2018-07-16 LAB
ANION GAP SERPL CALCULATED.3IONS-SCNC: 9 MMOL/L (ref 5–18)
BUN SERPL-MCNC: 17 MG/DL (ref 8–28)
CALCIUM SERPL-MCNC: 7.9 MG/DL (ref 8.5–10.5)
CHLORIDE BLD-SCNC: 104 MMOL/L (ref 98–107)
CO2 SERPL-SCNC: 23 MMOL/L (ref 22–31)
CREAT SERPL-MCNC: 0.69 MG/DL (ref 0.7–1.3)
ERYTHROCYTE [DISTWIDTH] IN BLOOD BY AUTOMATED COUNT: 15.4 % (ref 11–14.5)
GFR SERPL CREATININE-BSD FRML MDRD: >60 ML/MIN/1.73M2
GLUCOSE BLD-MCNC: 78 MG/DL (ref 70–125)
HCT VFR BLD AUTO: 38 % (ref 40–54)
HGB BLD-MCNC: 12.6 G/DL (ref 14–18)
MCH RBC QN AUTO: 33.2 PG (ref 27–34)
MCHC RBC AUTO-ENTMCNC: 33.2 G/DL (ref 32–36)
MCV RBC AUTO: 100 FL (ref 80–100)
PLATELET # BLD AUTO: 226 THOU/UL (ref 140–440)
PMV BLD AUTO: 10.8 FL (ref 8.5–12.5)
POTASSIUM BLD-SCNC: 4.1 MMOL/L (ref 3.5–5)
RBC # BLD AUTO: 3.8 MILL/UL (ref 4.4–6.2)
SODIUM SERPL-SCNC: 136 MMOL/L (ref 136–145)
WBC: 14 THOU/UL (ref 4–11)

## 2018-07-19 ENCOUNTER — NURSING HOME VISIT (OUTPATIENT)
Dept: GERIATRICS | Facility: CLINIC | Age: 83
End: 2018-07-19
Payer: MEDICARE

## 2018-07-19 VITALS
WEIGHT: 213 LBS | OXYGEN SATURATION: 96 % | BODY MASS INDEX: 29.01 KG/M2 | TEMPERATURE: 96.6 F | SYSTOLIC BLOOD PRESSURE: 123 MMHG | HEART RATE: 59 BPM | RESPIRATION RATE: 16 BRPM | DIASTOLIC BLOOD PRESSURE: 85 MMHG

## 2018-07-19 DIAGNOSIS — M86.60 CHRONIC OSTEOMYELITIS (H): ICD-10-CM

## 2018-07-19 DIAGNOSIS — G47.33 OSA (OBSTRUCTIVE SLEEP APNEA): ICD-10-CM

## 2018-07-19 DIAGNOSIS — E87.70 HYPERVOLEMIA, UNSPECIFIED HYPERVOLEMIA TYPE: ICD-10-CM

## 2018-07-19 DIAGNOSIS — B96.20 E. COLI UTI: Primary | ICD-10-CM

## 2018-07-19 DIAGNOSIS — D69.6 THROMBOCYTOPENIA (H): ICD-10-CM

## 2018-07-19 DIAGNOSIS — J44.9 CHRONIC OBSTRUCTIVE PULMONARY DISEASE, UNSPECIFIED COPD TYPE (H): ICD-10-CM

## 2018-07-19 DIAGNOSIS — I48.20 CHRONIC ATRIAL FIBRILLATION (H): ICD-10-CM

## 2018-07-19 DIAGNOSIS — K21.9 GASTROESOPHAGEAL REFLUX DISEASE, ESOPHAGITIS PRESENCE NOT SPECIFIED: ICD-10-CM

## 2018-07-19 DIAGNOSIS — N39.0 E. COLI UTI: Primary | ICD-10-CM

## 2018-07-19 DIAGNOSIS — K66.0 ADHESION OF OMENTUM: ICD-10-CM

## 2018-07-19 DIAGNOSIS — F32.A DEPRESSION, UNSPECIFIED DEPRESSION TYPE: ICD-10-CM

## 2018-07-19 DIAGNOSIS — K59.00 CONSTIPATION, UNSPECIFIED CONSTIPATION TYPE: ICD-10-CM

## 2018-07-19 DIAGNOSIS — S73.005D DISLOCATION OF LEFT HIP, SUBSEQUENT ENCOUNTER: ICD-10-CM

## 2018-07-19 DIAGNOSIS — C91.10 CLL (CHRONIC LYMPHOCYTIC LEUKEMIA) (H): ICD-10-CM

## 2018-07-19 DIAGNOSIS — N17.0 ATN (ACUTE TUBULAR NECROSIS) (H): ICD-10-CM

## 2018-07-19 PROCEDURE — 99310 SBSQ NF CARE HIGH MDM 45: CPT | Performed by: NURSE PRACTITIONER

## 2018-07-19 NOTE — PROGRESS NOTES
"[facility]  PRIMARY CARE PROVIDER AND CLINIC:  Tonia Oc [facility] [address]  Chief Complaint   Patient presents with     Hospital F/U     [facility] Medical Record Number:  [id number]    HPI:    Bijan Daniel is a 83 year old  (8/12/1934),admitted to the [facility] from Hospital  [facility] . Hospital stay 7/7/18 through 7/15/18. Admitted to this facility for  rehab, medical management and nursing care.  HPI information obtained from: facility chart records, facility staff, patient report and [facility] chart review. Hospital course per review of progress notes:    This is an 83-year-old male, with a past medical history significant for bilateral hip replacements, left CHRIS dislocation with closed reduction 8/2/15, coronary artery disease, atrial fibrillation on anticoagulation, CLL, GERD, COPD, MARI on CPAP, chronic osteomyelitis involving the right humeral head on suppressive antibiotic therapy s/p total shoulder arthroplasty and depression, who was initially admitted to [facility] for left hip pain. A left hip x-ray revealed \"dislocation of left hip arthroplasty\" which was reduced in the ED. Admitted for observation and developed hypotension with periumbilical pain. Labs revealed Lactate 4.3, Hemoglobin 13, down from 15.2 day prior, and Creatinine 1.6. Urine output was noted to be low. A fluid bolus was administered as well as antibiotics. An abdominal CT revealed findings concerning for acute mesenteric ischemia. Transferred to [facility] for ICU admission.     While at [facility], a diagnostic laparoscopy converted to an open exploration was performed on 7/8/18 and revealed no acute findings. Questioned if intermittent bowel ischemia was related to extensive omental adhesions from previous open appendectomy. Treated for septic shock requiring intubation secondary to an E. Coli UTI. " Infectious Disease was consulted and antibiotics were prescribed. Developed ATN secondary to septic shock and volume overload with fluid resuscitation. EF 55-60% on echocardiogram 7/8/18. IV diuresis was performed. Also developed thrombocytopenia secondary to septic shock. A TCU stay was recommended for ongoing physical rehabilitation.     Current issues are:        Complains of some shortness of breath with activity. Relieved with rest. Wants to get stronger so he can return to his condo with his wife. Lives in a condo with elevators. Does not require any stairs to get into his condo. Denies chest pain or abdominal pain. Confirms FULL CODE status.    Discussed code status with wife, Marlyn, via telephone as she had completed paperwork at the facility that said DNR/DNI. She states the patient is of sound mind and can make his own decisions. Is concerned about a cough he has. Questions when it will go away.    CODE STATUS/ADVANCE DIRECTIVES DISCUSSION: FULL CODE - Confirmed with patient and wife, Marlyn, via telephone  Patient's living condition: lives with spouse    ALLERGIES:Nuts  PAST MEDICAL HISTORY:  has no past medical history on file.  PAST SURGICAL HISTORY:  has a past surgical history that includes ANESTH,SHOULDER REPLACEMENT; TOTAL HIP ARTHROPLASTY (Right, 2008); TOTAL HIP ARTHROPLASTY (Left, 2009); removal shoulder arthroplasty; and appendectomy (Right).  FAMILY HISTORY: family history is not on file.  SOCIAL HISTORY:  reports that he has quit smoking. He has never used smokeless tobacco. He reports that he drinks alcohol. He reports that he does not use illicit drugs.    Post Discharge Medication Reconciliation Status: discharge medications reconciled, continue medications without change.  Current Outpatient Prescriptions   Medication Sig Dispense Refill     ACETAMINOPHEN PO Take 1,000 mg by mouth 3 times daily        amoxicillin (AMOXIL) 250 MG capsule Take 250 mg by mouth       Apixaban (ELIQUIS PO)  Take 5 mg by mouth 2 times daily       Calcium carb-Vitamin D 500 mg [place]-200 units (OSCAL WITH D;OYSTER SHELL CALCIUM) 500-200 MG-UNIT per tablet Take 2 tablets by mouth 2 times daily       ipratropium (ATROVENT) 0.06 % spray Spray 2 sprays into both nostrils 4 times daily as needed for rhinitis 1 Box 1     metoprolol (LOPRESSOR) 100 MG tablet TAKE 1 TABLET ORALLY TWICE DAILY       omeprazole (PRILOSEC) 20 MG CR capsule Take 20 mg by mouth daily        senna-docusate (SENOKOT-S;PERICOLACE) 8.6-50 MG per tablet Take 4 tablets by mouth daily as needed        THIAMINE HCL PO Take 100 mg by mouth daily       venlafaxine (EFFEXOR-XR) 37.5 MG 24 hr capsule Take 1 capsule (37.5 mg) by mouth daily 90 capsule 3       ROS:  10 point ROS of systems including Constitutional, Eyes, Respiratory, Cardiovascular, Gastroenterology, Genitourinary, Integumentary, Muscularskeletal, Psychiatric were all negative except for pertinent positives noted in my HPI.    Exam:  /85  Pulse 59  Temp 96.6  F (35.9  C)  Resp 16  Wt 213 lb (96.6 kg)  SpO2 96%  BMI 29.01 kg/m2  GENERAL APPEARANCE:  Alert, in no distress  ENT:  Mouth and posterior oropharynx normal, moist mucous membranes  EYES:  EOM, conjunctivae, lids, pupils and irises normal  RESP:  respiratory effort and palpation of chest normal, lungs clear to auscultation , no respiratory distress, in bilateral anterior upper lobes  CV:  Palpation and auscultation of heart done , irregular rhythm regular rate  ABDOMEN:  normal bowel sounds, soft, nontender, no hepatosplenomegaly or other masses, ABD dressing in place  M/S:   Active movement of bilateral upper and lower extremities. Trace edema.  SKIN:  Inspection of skin and subcutaneous tissue baseline, Palpation of skin and subcutaneous tissue baseline  NEURO:   Cranial nerves 2-12 are normal tested and grossly at patient's baseline  PSYCH:  affect and mood normal    Lab/Diagnostic data:  CBC W PLT NO DIFF (07/13/2018 11:52  PM)  CBC W PLT NO DIFF (07/13/2018 11:52 PM)   Component Value Ref Range   WHITE BLOOD COUNT  16.1 (H) 4.5 - 11.0 thou/cu mm   RED BLOOD COUNT  3.86 (L) 4.30 - 5.90 mil/cu mm   HEMOGLOBIN  12.6 (L) 13.5 - 17.5 g/dL   HEMATOCRIT  37.1 37.0 - 53.0 %   MCV  96 80 - 100 fL   MCH  32.6 26.0 - 34.0 pg   MCHC  34.0 32.0 - 36.0 g/dL   RDW  15.0 11.5 - 15.5 %   PLATELET COUNT  118 (L) 140 - 440 thou/cu mm   MPV  11.2 (H) 6.5 - 11.0 fL     Basic metabolic panel AM (07/12/2018 6:34 AM)  Basic metabolic panel AM (07/12/2018 6:34 AM)   Component Value Ref Range   SODIUM 135 135 - 145 mmol/L   POTASSIUM 4.1 3.5 - 5.0 mmol/L   CHLORIDE 104 98 - 110 mmol/L   CO2,TOTAL 25 21 - 31 mmol/L   ANION GAP 6 5 - 18    GLUCOSE 98 65 - 100 mg/dL   CALCIUM 8.1 (L) 8.5 - 10.5 mg/dL   BUN 19 8 - 25 mg/dL   CREATININE 0.73 0.72 - 1.25 mg/dL   BUN/CREAT RATIO  26 (H) 10 - 20    GFR if African American >60 >60 ml/min/1.73m2   GFR if not African American >60 >60 ml/min/1.73m2     ASSESSMENT/PLAN:  Septic Shock Secondary to E. Coli Urinary Tract Infection. Treated with IV antibiotics during hospitalization until 7/15/18. Physical and Occupational Therapy ordered for deconditioning.    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 A diagnostic laparoscopy converted to an open exploration was performed on 7/8/18 and revealed no acute findings. Questioned if intermittent bowel ischemia was related to extensive omental adhesions from previous open appendectomy. Today, patient denies pain. Abdominal staples to be removed in 10-14 days.     Hypervolemia. Secondary to septic shock and fluid resuscitation. EF 55-60% on 7/8/18. Admission weight 93.2 kg -> 100.3 kg on 7/11/18 during hospitalization requiring IV diuresis. TCU admission weight 198.7 lbs on 7/15/18 and 213 lbs on 7/17/18. Question accuracy of weights. Ordered daily weights while in TCU. Consider re-initiation of diuretics if upward trend. Monitor respiratory status  closely.    Acute Kidney Injury. Secondary to ATN from septic shock. Resolved prior to hospital discharge. Peaked at 1.65 on 7/7/18. Last Creatinine 0.73 on 7/13/18. Repeat BMP on 7/23/18 to ensure stability.    Thrombocytopenia. Likely secondary to septic shock. As low as 45 on 7/10/18. Last Platelet Count 133 on 7/14/18. Repeat CBC on 7/23/18 to ensure stability.     Left Hip Dislocation S/P Reduction on 7/6/18 with 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 Also had left hip dislocation on 8/2/15 requiring reduction. Ortho consulted and recommended hip abduction brace when up in chair. Full weight bearing with brace. Has knee immobilizer. Acetaminophen scheduled for pain. Physical and Occupational Therapy ordered for deconditioning.     Chronic Atrial Fibrillation on Anticoagulation. Heart rate primarily 80-90s. Continue Metoprolol and Apixaban as ordered.    Obstructive Sleep Apnea. Continue CPAP at home settings.    Chronic Lymphocytic Leukemia. Noted in history. No recent labs available to review. Oncology notes from 2012 indicate observation.     Chronic Obstructive Pulmonary Disease. Noted in history. On no medications. Monitor respiratory status closely due to wife's report of a cough.    Chronic Osteomyelitis of Right Shoulder S/P Right Total Shoulder Arthroplasty in 1993. Chronic issue with multiple surgeries. Continue Amoxicillin as ordered.    Gastroesophageal Reflux Disease. Continue Omeprazole as ordered.    Depression. Continue Venlafaxine as ordered.    Constipation. Continue Senna-S as ordered.    Chronic Rhinitis. Continue Ipratropium as ordered.    Total time spent with patient visit was 45 min including patient visit, review of past records and phone call to patient contact. Greater than 50% of total time spent with counseling and coordinating care due to contacting wife Marlyn, reviewing old records and reviewing hospital course.      Electronically signed by:  HOLLI Alexandra CNP

## 2018-07-19 NOTE — LETTER
"    7/19/2018        RE: Bijan Daniel  [address]  [id number]  [facility] [address]        [place] [facility]  PRIMARY CARE PROVIDER AND CLINIC:  Oc Randall [address] [facility] / [facility] [address]  Chief Complaint   Patient presents with     Hospital F/U     [place] Medical Record Number:  [id number]    HPI:    Bijan Daniel is a 83 year old  (8/12/1934),admitted to the [facility] from Hospital  [facility] . Hospital stay 7/7/18 through 7/15/18. Admitted to this facility for  rehab, medical management and nursing care.  HPI information obtained from: facility chart records, facility staff, patient report and [facility] chart review. Hospital course per review of progress notes:    This is an 83-year-old male, with a past medical history significant for bilateral hip replacements, left HCRIS dislocation with closed reduction 8/2/15, coronary artery disease, atrial fibrillation on anticoagulation, CLL, GERD, COPD, MARI on CPAP, chronic osteomyelitis involving the right humeral head on suppressive antibiotic therapy s/p total shoulder arthroplasty and depression, who was initially admitted to [facility] for left hip pain. A left hip x-ray revealed \"dislocation of left hip arthroplasty\" which was reduced in the ED. Admitted for observation and developed hypotension with periumbilical pain. Labs revealed Lactate 4.3, Hemoglobin 13, down from 15.2 day prior, and Creatinine 1.6. Urine output was noted to be low. A fluid bolus was administered as well as antibiotics. An abdominal CT revealed findings concerning for acute mesenteric ischemia. Transferred to [facility] for ICU admission.     While at [facility], a diagnostic laparoscopy converted to an open exploration was performed on 7/8/18 and revealed no acute findings. Questioned if intermittent bowel ischemia was related to extensive omental adhesions " from previous open appendectomy. Treated for septic shock requiring intubation secondary to an E. Coli UTI. Infectious Disease was consulted and antibiotics were prescribed. Developed ATN secondary to septic shock and volume overload with fluid resuscitation. EF 55-60% on echocardiogram 7/8/18. IV diuresis was performed. Also developed thrombocytopenia secondary to septic shock. A TCU stay was recommended for ongoing physical rehabilitation.     Current issues are:        Complains of some shortness of breath with activity. Relieved with rest. Wants to get stronger so he can return to his condo with his wife. Lives in a condo with elevators. Does not require any stairs to get into his condo. Denies chest pain or abdominal pain. Confirms FULL CODE status.    Discussed code status with wife, Marlyn, via telephone as she had completed paperwork at the facility that said DNR/DNI. She states the patient is of sound mind and can make his own decisions. Is concerned about a cough he has. Questions when it will go away.    CODE STATUS/ADVANCE DIRECTIVES DISCUSSION: FULL CODE - Confirmed with patient and wife, Marlyn, via telephone  Patient's living condition: lives with spouse    ALLERGIES:Nuts  PAST MEDICAL HISTORY:  has no past medical history on file.  PAST SURGICAL HISTORY:  has a past surgical history that includes ANESTH,SHOULDER REPLACEMENT; TOTAL HIP ARTHROPLASTY (Right, 2008); TOTAL HIP ARTHROPLASTY (Left, 2009); removal shoulder arthroplasty; and appendectomy (Right).  FAMILY HISTORY: family history is not on file.  SOCIAL HISTORY:  reports that he has quit smoking. He has never used smokeless tobacco. He reports that he drinks alcohol. He reports that he does not use illicit drugs.    Post Discharge Medication Reconciliation Status: discharge medications reconciled, continue medications without change.  Current Outpatient Prescriptions   Medication Sig Dispense Refill     ACETAMINOPHEN PO Take 1,000 mg by mouth 3  times daily        amoxicillin (AMOXIL) 250 MG capsule Take 250 mg by mouth       Apixaban (ELIQUIS PO) Take 5 mg by mouth 2 times daily       Calcium carb-Vitamin D 500 mg [place]-200 units (OSCAL WITH D;OYSTER SHELL CALCIUM) 500-200 MG-UNIT per tablet Take 2 tablets by mouth 2 times daily       ipratropium (ATROVENT) 0.06 % spray Spray 2 sprays into both nostrils 4 times daily as needed for rhinitis 1 Box 1     metoprolol (LOPRESSOR) 100 MG tablet TAKE 1 TABLET ORALLY TWICE DAILY       omeprazole (PRILOSEC) 20 MG CR capsule Take 20 mg by mouth daily        senna-docusate (SENOKOT-S;PERICOLACE) 8.6-50 MG per tablet Take 4 tablets by mouth daily as needed        THIAMINE HCL PO Take 100 mg by mouth daily       venlafaxine (EFFEXOR-XR) 37.5 MG 24 hr capsule Take 1 capsule (37.5 mg) by mouth daily 90 capsule 3       ROS:  10 point ROS of systems including Constitutional, Eyes, Respiratory, Cardiovascular, Gastroenterology, Genitourinary, Integumentary, Muscularskeletal, Psychiatric were all negative except for pertinent positives noted in my HPI.    Exam:  /85  Pulse 59  Temp 96.6  F (35.9  C)  Resp 16  Wt 213 lb (96.6 kg)  SpO2 96%  BMI 29.01 kg/m2  GENERAL APPEARANCE:  Alert, in no distress  ENT:  Mouth and posterior oropharynx normal, moist mucous membranes  EYES:  EOM, conjunctivae, lids, pupils and irises normal  RESP:  respiratory effort and palpation of chest normal, lungs clear to auscultation , no respiratory distress, in bilateral anterior upper lobes  CV:  Palpation and auscultation of heart done , irregular rhythm regular rate  ABDOMEN:  normal bowel sounds, soft, nontender, no hepatosplenomegaly or other masses, ABD dressing in place  M/S:   Active movement of bilateral upper and lower extremities. Trace edema.  SKIN:  Inspection of skin and subcutaneous tissue baseline, Palpation of skin and subcutaneous tissue baseline  NEURO:   Cranial nerves 2-12 are normal tested and grossly at patient's  baseline  PSYCH:  affect and mood normal    Lab/Diagnostic data:  CBC W PLT NO DIFF (07/13/2018 11:52 PM)  CBC W PLT NO DIFF (07/13/2018 11:52 PM)   Component Value Ref Range   WHITE BLOOD COUNT  16.1 (H) 4.5 - 11.0 thou/cu mm   RED BLOOD COUNT  3.86 (L) 4.30 - 5.90 mil/cu mm   HEMOGLOBIN  12.6 (L) 13.5 - 17.5 g/dL   HEMATOCRIT  37.1 37.0 - 53.0 %   MCV  96 80 - 100 fL   MCH  32.6 26.0 - 34.0 pg   MCHC  34.0 32.0 - 36.0 g/dL   RDW  15.0 11.5 - 15.5 %   PLATELET COUNT  118 (L) 140 - 440 thou/cu mm   MPV  11.2 (H) 6.5 - 11.0 fL     Basic metabolic panel AM (07/12/2018 6:34 AM)  Basic metabolic panel AM (07/12/2018 6:34 AM)   Component Value Ref Range   SODIUM 135 135 - 145 mmol/L   POTASSIUM 4.1 3.5 - 5.0 mmol/L   CHLORIDE 104 98 - 110 mmol/L   CO2,TOTAL 25 21 - 31 mmol/L   ANION GAP 6 5 - 18    GLUCOSE 98 65 - 100 mg/dL   CALCIUM 8.1 (L) 8.5 - 10.5 mg/dL   BUN 19 8 - 25 mg/dL   CREATININE 0.73 0.72 - 1.25 mg/dL   BUN/CREAT RATIO  26 (H) 10 - 20    GFR if African American >60 >60 ml/min/1.73m2   GFR if not African American >60 >60 ml/min/1.73m2     ASSESSMENT/PLAN:  Septic Shock Secondary to E. Coli Urinary Tract Infection. Treated with IV antibiotics during hospitalization until 7/15/18. Physical and Occupational Therapy ordered for deconditioning.    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 A diagnostic laparoscopy converted to an open exploration was performed on 7/8/18 and revealed no acute findings. Questioned if intermittent bowel ischemia was related to extensive omental adhesions from previous open appendectomy. Today, patient denies pain. Abdominal staples to be removed in 10-14 days.     Hypervolemia. Secondary to septic shock and fluid resuscitation. EF 55-60% on 7/8/18. Admission weight 93.2 kg -> 100.3 kg on 7/11/18 during hospitalization requiring IV diuresis. TCU admission weight 198.7 lbs on 7/15/18 and 213 lbs on 7/17/18. Question accuracy of weights. Ordered daily weights  while in TCU. Consider re-initiation of diuretics if upward trend. Monitor respiratory status closely.    Acute Kidney Injury. Secondary to ATN from septic shock. Resolved prior to hospital discharge. Peaked at 1.65 on 7/7/18. Last Creatinine 0.73 on 7/13/18. Repeat BMP on 7/23/18 to ensure stability.    Thrombocytopenia. Likely secondary to septic shock. As low as 45 on 7/10/18. Last Platelet Count 133 on 7/14/18. Repeat CBC on 7/23/18 to ensure stability.     Left Hip Dislocation S/P Reduction on 7/6/18 with 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 Also had left hip dislocation on 8/2/15 requiring reduction. Ortho consulted and recommended hip abduction brace when up in chair. Full weight bearing with brace. Has knee immobilizer. Acetaminophen scheduled for pain. Physical and Occupational Therapy ordered for deconditioning.     Chronic Atrial Fibrillation on Anticoagulation. Heart rate primarily 80-90s. Continue Metoprolol and Apixaban as ordered.    Obstructive Sleep Apnea. Continue CPAP at home settings.    Chronic Lymphocytic Leukemia. Noted in history. No recent labs available to review. Oncology notes from 2012 indicate observation.     Chronic Obstructive Pulmonary Disease. Noted in history. On no medications. Monitor respiratory status closely due to wife's report of a cough.    Chronic Osteomyelitis of Right Shoulder S/P Right Total Shoulder Arthroplasty in 1993. Chronic issue with multiple surgeries. Continue Amoxicillin as ordered.    Gastroesophageal Reflux Disease. Continue Omeprazole as ordered.    Depression. Continue Venlafaxine as ordered.    Constipation. Continue Senna-S as ordered.    Chronic Rhinitis. Continue Ipratropium as ordered.    Total time spent with patient visit was 45 min including patient visit, review of past records and phone call to patient contact. Greater than 50% of total time spent with counseling and coordinating care due  to contacting wife Marlyn, reviewing old records and reviewing hospital course.     Electronically signed by:  HOLLI Alexandra CNP          Sincerely,        HOLLI Alexandra CNP

## 2018-07-23 ENCOUNTER — TRANSFERRED RECORDS (OUTPATIENT)
Dept: HEALTH INFORMATION MANAGEMENT | Facility: CLINIC | Age: 83
End: 2018-07-23

## 2018-07-23 ENCOUNTER — RECORDS - HEALTHEAST (OUTPATIENT)
Dept: LAB | Facility: CLINIC | Age: 83
End: 2018-07-23

## 2018-07-23 ENCOUNTER — NURSING HOME VISIT (OUTPATIENT)
Dept: GERIATRICS | Facility: CLINIC | Age: 83
End: 2018-07-23
Payer: MEDICARE

## 2018-07-23 VITALS
OXYGEN SATURATION: 92 % | DIASTOLIC BLOOD PRESSURE: 85 MMHG | HEIGHT: 74 IN | BODY MASS INDEX: 25.98 KG/M2 | SYSTOLIC BLOOD PRESSURE: 115 MMHG | RESPIRATION RATE: 17 BRPM | WEIGHT: 202.4 LBS | TEMPERATURE: 97 F | HEART RATE: 62 BPM

## 2018-07-23 DIAGNOSIS — K21.9 GASTROESOPHAGEAL REFLUX DISEASE, ESOPHAGITIS PRESENCE NOT SPECIFIED: Primary | ICD-10-CM

## 2018-07-23 DIAGNOSIS — E87.70 HYPERVOLEMIA, UNSPECIFIED HYPERVOLEMIA TYPE: ICD-10-CM

## 2018-07-23 DIAGNOSIS — D69.6 THROMBOCYTOPENIA (H): ICD-10-CM

## 2018-07-23 LAB
ANION GAP SERPL CALCULATED.3IONS-SCNC: 9 MMOL/L (ref 5–18)
ANION GAP SERPL CALCULATED.3IONS-SCNC: 9 MMOL/L (ref 5–18)
BUN SERPL-MCNC: 14 MG/DL (ref 8–28)
BUN SERPL-MCNC: 14 MG/DL (ref 8–28)
CALCIUM SERPL-MCNC: 8.3 MG/DL (ref 8.5–10.5)
CALCIUM SERPL-MCNC: 8.3 MG/DL (ref 8.5–10.5)
CHLORIDE BLD-SCNC: 105 MMOL/L (ref 98–107)
CHLORIDE SERPLBLD-SCNC: 105 MMOL/L (ref 98–107)
CO2 SERPL-SCNC: 21 MMOL/L (ref 22–31)
CO2 SERPL-SCNC: 21 MMOL/L (ref 22–31)
CREAT SERPL-MCNC: 0.69 MG/DL (ref 0.7–1.3)
CREAT SERPL-MCNC: 0.69 MG/DL (ref 0.7–1.3)
GFR SERPL CREATININE-BSD FRML MDRD: >60 ML/MIN/1.73M2
GFR SERPL CREATININE-BSD FRML MDRD: >60 ML/MIN/1.73M2
GLUCOSE BLD-MCNC: 113 MG/DL (ref 70–125)
GLUCOSE SERPL-MCNC: 113 MG/DL (ref 70–125)
POTASSIUM BLD-SCNC: 4.7 MMOL/L (ref 3.5–5)
POTASSIUM SERPL-SCNC: 4.7 MMOL/L (ref 3.5–5)
SODIUM SERPL-SCNC: 135 MMOL/L (ref 136–145)
SODIUM SERPL-SCNC: 135 MMOL/L (ref 136–145)

## 2018-07-23 PROCEDURE — 99309 SBSQ NF CARE MODERATE MDM 30: CPT | Performed by: NURSE PRACTITIONER

## 2018-07-23 NOTE — PROGRESS NOTES
"[place] [facility]    Chief Complaint   Patient presents with     [facility] Acute     [place] Medical Record Number:  [id number]    HPI:    Bijan Daniel is a 83 year old  (8/12/1934), who is being seen today for an episodic care visit at [facility]. HPI information obtained from: facility chart records, facility staff, patient report and [facility] chart review. Today's concern is:    Gastroesophageal Reflux Disease. Complains of indigestion. Started months ago. Occurs when eating. \"Feels like I can't shove anymore down\". States he takes a pill for this that starts with an O. Denies sharp pain, nausea, vomiting, or abdominal pain.    Hypervolemia. Secondary to septic shock and fluid resuscitation during hospitalization. Denies shortness of breath. Upon review of documentation, question accuracy of some weights. Most weights ~ 202 lbs since TCU admission. Staff concerned regarding edema in legs.     Thrombocytopenia. Platelet Count 226 on 7/16/18. Previous Platelet Count 118 on 7/13/18.     ALLERGIES: Nuts  Past Medical, Surgical, Family and Social History reviewed and updated in [facility].    Current Outpatient Prescriptions   Medication Sig Dispense Refill     ACETAMINOPHEN PO Take 1,000 mg by mouth 3 times daily        amoxicillin (AMOXIL) 250 MG capsule Take 250 mg by mouth daily        Apixaban (ELIQUIS PO) Take 5 mg by mouth 2 times daily       Calcium (OYSTER-BARBARA 500 PO) Take 2 tablets by mouth 2 times daily       ipratropium (ATROVENT) 0.06 % spray Spray 2 sprays into both nostrils 4 times daily as needed for rhinitis 1 Box 1     metoprolol (LOPRESSOR) 100 MG tablet TAKE 1 TABLET ORALLY TWICE DAILY       omeprazole (PRILOSEC) 20 MG CR capsule Take 40 mg by mouth daily        senna-docusate (SENOKOT-S;PERICOLACE) 8.6-50 MG per tablet Take 4 tablets by mouth daily as needed        THIAMINE HCL PO Take 100 mg by mouth daily       venlafaxine (EFFEXOR-XR) 37.5 MG 24 hr " "capsule Take 1 capsule (37.5 mg) by mouth daily 90 capsule 3     Medications reviewed:  Medications reconciled to facility chart and changes were made to reflect current medications as identified as above med list. Below are the changes that were made:   Medications stopped since last EPIC medication reconciliation:   There are no discontinued medications.    Medications started since last [facility] medication reconciliation:  No orders of the defined types were placed in this encounter.    REVIEW OF SYSTEMS:  4 point ROS including Respiratory, CV, GI and , other than that noted in the HPI,  is negative    Physical Exam:  /85  Pulse 62  Temp 97  F (36.1  C)  Resp 17  Ht 6' 2\" (1.88 m)  Wt 202 lb 6.4 oz (91.8 kg)  SpO2 92%  BMI 25.99 kg/m2  GENERAL APPEARANCE:  Alert, in no distress  ENT:  Mouth and posterior oropharynx normal, moist mucous membranes  EYES:  EOM, conjunctivae, lids, pupils and irises normal  RESP:  respiratory effort and palpation of chest normal, lungs clear to auscultation , no respiratory distress  CV:  Palpation and auscultation of heart done , irregular rhythm regular rate  ABDOMEN:  normal bowel sounds, soft, nontender, no hepatosplenomegaly or other masses  M/S:   Active movement of bilateral upper and lower extremities. Hip abduction brace. 2+ edema in BLE.  SKIN:  Inspection of skin and subcutaneous tissue baseline, Palpation of skin and subcutaneous tissue baseline  NEURO:   Cranial nerves 2-12 are normal tested and grossly at patient's baseline  PSYCH:  affect and mood normal    Recent Labs:   CBC W PLT NO DIFF (07/13/2018 11:52 PM)       CBC W PLT NO DIFF (07/13/2018 11:52 PM)   Component Value Ref Range   WHITE BLOOD COUNT  16.1 (H) 4.5 - 11.0 thou/cu mm   RED BLOOD COUNT  3.86 (L) 4.30 - 5.90 mil/cu mm   HEMOGLOBIN  12.6 (L) 13.5 - 17.5 g/dL   HEMATOCRIT  37.1 37.0 - 53.0 %   MCV  96 80 - 100 fL   MCH  32.6 26.0 - 34.0 pg   MCHC  34.0 32.0 - 36.0 g/dL   RDW  15.0 11.5 - 15.5 % "   PLATELET COUNT  118 (L) 140 - 440 thou/cu mm   MPV  11.2 (H) 6.5 - 11.0 fL      Basic metabolic panel AM (07/12/2018 6:34 AM)       Basic metabolic panel AM (07/12/2018 6:34 AM)   Component Value Ref Range   SODIUM 135 135 - 145 mmol/L   POTASSIUM 4.1 3.5 - 5.0 mmol/L   CHLORIDE 104 98 - 110 mmol/L   CO2,TOTAL 25 21 - 31 mmol/L   ANION GAP 6 5 - 18    GLUCOSE 98 65 - 100 mg/dL   CALCIUM 8.1 (L) 8.5 - 10.5 mg/dL   BUN 19 8 - 25 mg/dL   CREATININE 0.73 0.72 - 1.25 mg/dL   BUN/CREAT RATIO  26 (H) 10 - 20    GFR if African American >60 >60 ml/min/1.73m2   GFR if not African American >60 >60 ml/min/1.73m2     ASSESSMENT/PLAN:  Gastroesophageal Reflux Disease. Recently admitted to hospital with concern for mesenteric ischemia, but abdominal open exploration revealed no acute findings. As complaints of indigestion months ago and occur around meal time, more likely related to GERD. Unlikely cardiac etiology given correlation with meal times. Will increase Omeprazole from 20 mg to 40 mg. Further plans pending results.    Hypervolemia. Secondary to septic shock and fluid resuscitation. EF 55-60% on 7/8/18. Some weights of questionable accuracy, but trend appears stable since TCU admission. No shortness of breath. DARRON stockings ordered edema.     Thrombocytopenia. Resolved. Secondary to septic shock. Monitor Platelet Count periodically.     Electronically signed by  HOLLI Alexandra CNP

## 2018-07-24 ENCOUNTER — TRANSFERRED RECORDS (OUTPATIENT)
Dept: HEALTH INFORMATION MANAGEMENT | Facility: CLINIC | Age: 83
End: 2018-07-24

## 2018-07-24 ENCOUNTER — RECORDS - HEALTHEAST (OUTPATIENT)
Dept: LAB | Facility: CLINIC | Age: 83
End: 2018-07-24

## 2018-07-24 LAB
ERYTHROCYTE [DISTWIDTH] IN BLOOD BY AUTOMATED COUNT: 15.1 % (ref 11–14.5)
ERYTHROCYTE [DISTWIDTH] IN BLOOD BY AUTOMATED COUNT: 15.1 % (ref 11–14.5)
HCT VFR BLD AUTO: 37.6 % (ref 40–54)
HCT VFR BLD AUTO: 37.6 % (ref 40–54)
HEMOGLOBIN: 12.8 G/DL (ref 14–18)
HGB BLD-MCNC: 12.8 G/DL (ref 14–18)
MCH RBC QN AUTO: 33.7 PG (ref 27–34)
MCH RBC QN AUTO: 33.7 PG (ref 27–34)
MCHC RBC AUTO-ENTMCNC: 34 G/DL (ref 32–36)
MCHC RBC AUTO-ENTMCNC: 34 G/DL (ref 32–36)
MCV RBC AUTO: 99 FL (ref 80–100)
MCV RBC AUTO: 99 FL (ref 80–100)
PLATELET # BLD AUTO: 374 THOU/UL (ref 140–440)
PLATELET # BLD AUTO: 374 THOU/UL (ref 140–440)
PMV BLD AUTO: 9.3 FL (ref 8.5–12.5)
RBC # BLD AUTO: 3.8 MILL/UL (ref 4.4–6.2)
RBC # BLD AUTO: 3.8 MILL/UL (ref 4.4–6.2)
WBC # BLD AUTO: 13.1 THOU/UL (ref 4–11)
WBC: 13.1 THOU/UL (ref 4–11)

## 2018-07-27 ENCOUNTER — NURSING HOME VISIT (OUTPATIENT)
Dept: GERIATRICS | Facility: CLINIC | Age: 83
End: 2018-07-27
Payer: MEDICARE

## 2018-07-27 VITALS
TEMPERATURE: 95.4 F | HEIGHT: 74 IN | HEART RATE: 71 BPM | SYSTOLIC BLOOD PRESSURE: 108 MMHG | WEIGHT: 204.6 LBS | DIASTOLIC BLOOD PRESSURE: 79 MMHG | BODY MASS INDEX: 26.26 KG/M2 | OXYGEN SATURATION: 97 % | RESPIRATION RATE: 15 BRPM

## 2018-07-27 DIAGNOSIS — I48.20 CHRONIC ATRIAL FIBRILLATION (H): ICD-10-CM

## 2018-07-27 DIAGNOSIS — K59.01 SLOW TRANSIT CONSTIPATION: ICD-10-CM

## 2018-07-27 DIAGNOSIS — R53.81 PHYSICAL DECONDITIONING: ICD-10-CM

## 2018-07-27 DIAGNOSIS — T84.029D DISLOCATION OF HIP PROSTHESIS, SUBSEQUENT ENCOUNTER: ICD-10-CM

## 2018-07-27 DIAGNOSIS — J44.9 CHRONIC OBSTRUCTIVE PULMONARY DISEASE, UNSPECIFIED COPD TYPE (H): ICD-10-CM

## 2018-07-27 DIAGNOSIS — F41.8 DEPRESSION WITH ANXIETY: ICD-10-CM

## 2018-07-27 DIAGNOSIS — M15.0 PRIMARY OSTEOARTHRITIS INVOLVING MULTIPLE JOINTS: ICD-10-CM

## 2018-07-27 DIAGNOSIS — R65.21 SEPTIC SHOCK (H): Primary | ICD-10-CM

## 2018-07-27 DIAGNOSIS — A41.9 SEPTIC SHOCK (H): Primary | ICD-10-CM

## 2018-07-27 DIAGNOSIS — Z96.649 DISLOCATION OF HIP PROSTHESIS, SUBSEQUENT ENCOUNTER: ICD-10-CM

## 2018-07-27 DIAGNOSIS — G47.33 OSA (OBSTRUCTIVE SLEEP APNEA): ICD-10-CM

## 2018-07-27 PROCEDURE — 99306 1ST NF CARE HIGH MDM 50: CPT | Performed by: INTERNAL MEDICINE

## 2018-07-27 NOTE — PROGRESS NOTES
[place] [facility]  INITIAL VISIT NOTE  July 27, 2018    PRIMARY CARE PROVIDER AND CLINIC:  Oc Randall [address] [facility] / [facility] [address]    Chief Complaint   Patient presents with     Hospital F/U       HPI:    Bijan Daniel is a 83 year old  (8/12/1934) male who was seen at [facility] on July 27, 2018 for an initial visit. Medical history is notable for atrial fibrillation and COPD. He was hospitalized at [facility] from 7/6/18 to 7/7/18 where he presented with a dislocated prosthetic left hip. He was registered to [facility] overnight where he became hypotensive and developed abdominal pain. Lactate markedly elevated and imaging concerning for bowel ischemia. He was transferred to [facility] and was hospitalized there from 7/7/18 to 7/15/18 where etiology of septic shock was never elucidated, though he did have a LLL infiltrate and urine culture with >100k E. Coli.  He is s/p ex lap on day of transfer to [facility] which did not reveal any bowel ischemia. He did have adhesions from prior appendectomy and thought was these could have caused some transient bowel ischemia. He was admitted to this facility for medical management and rehab.     Today, Mr. Daniel is seen in his room after breakfast and therapy. Overall is doing OK. Still feels like he has quite a bit of strength to get back. He had to wear a similar abductor brace on his left hip for six weeks three years ago after dislocating it, and is hoping for similar plan this time. No abdominal pain. Bowel movements are regular, maybe a little firm. No chest pain or dyspnea. Sleeping well. Appetite is good. Working with therapies. No concerns per nursing.     CODE STATUS:   CPR/Full code     ALLERGIES:     Allergies   Allergen Reactions     Nuts Anaphylaxis       PAST MEDICAL HISTORY:   No past medical history on file.    PAST SURGICAL HISTORY:   Past Surgical History:   Procedure Laterality Date     APPENDECTOMY Right      AS TOTAL HIP  "ARTHROPLASTY Right 2008     AS TOTAL HIP ARTHROPLASTY Left 2009     C ANESTH,SHOULDER REPLACEMENT       removal shoulder arthroplasty         FAMILY HISTORY:   Reviewed and non-contributory    SOCIAL HISTORY:   Lives with spouse     MEDICATIONS:  Current Outpatient Prescriptions   Medication Sig Dispense Refill     ACETAMINOPHEN PO Take 1,000 mg by mouth 3 times daily        amoxicillin (AMOXIL) 250 MG capsule Take 250 mg by mouth daily        Apixaban (ELIQUIS PO) Take 5 mg by mouth 2 times daily       Calcium (OYSTER-BARBARA 500 PO) Take 2 tablets by mouth 2 times daily       ipratropium (ATROVENT) 0.06 % spray Spray 2 sprays into both nostrils 4 times daily as needed for rhinitis 1 Box 1     metoprolol (LOPRESSOR) 100 MG tablet TAKE 1 TABLET ORALLY TWICE DAILY       omeprazole (PRILOSEC) 20 MG CR capsule Take 40 mg by mouth daily        senna-docusate (SENOKOT-S;PERICOLACE) 8.6-50 MG per tablet Take 4 tablets by mouth daily as needed        THIAMINE HCL PO Take 100 mg by mouth daily       venlafaxine (EFFEXOR-XR) 37.5 MG 24 hr capsule Take 1 capsule (37.5 mg) by mouth daily 90 capsule 3       Post Discharge Medication Reconciliation Status: medication reconcilation previously completed during another office visit.    ROS:  10 point ROS neg other than the symptoms noted above in the HPI.    PHYSICAL EXAM:  /79  Pulse 71  Temp 95.4  F (35.2  C)  Resp 15  Ht 6' 2\" (1.88 m)  Wt 204 lb 9.6 oz (92.8 kg)  SpO2 97%  BMI 26.27 kg/m2  Gen: sitting in wheelchair, alert, cooperative and in no acute distress  HEENT: normocephalic; hard of hearing  Card: RRR, S1, S2, no murmurs  Resp: lungs clear to auscultation bilaterally, no crackles or wheezes  GI: abdomen soft, not-tender, non-distended  MSK: normal muscle tone, no LE edema with knee high DARRON stockings in place; left hip abductor brace in place   Neuro: CX II-XII grossly in tact; ROM in all four extremities grossly in tact, though RUE weakness noted  Psych: alert " and oriented x3; normal affect  Skin: midline abdominal incision is well healed    LABORATORY/IMAGING DATA:  Reviewed as per Epic    ASSESSMENT/PLAN:    Septic Shock  Unclear etiology. ?if some transient bowel ischemia in setting of intraabdominal adhesions. He is s/p ex lap which was unrevealing. He also had a CT chest with LLL infiltrate and a urine culture with >100k E Coli. Now, afebrile. No abdominal pain. Ex lap incision is healing well  -- follow clinically    Prosthetic Left Hip Dislocation  This was the reason he initially presented to the [place] ER on 7/6.   -- hip abductor brace to be worn when out of bed    Chronic Atrial Fibrillation  HR 60s-80s  -- rate controlled with metoprolol 100 mg BID  -- anticoagulated with apixaban 5 mg BID    COPD  No signs of exacerbation. Not on any controller meds.   -- follow clinically    Depression / Anxiety  Mood and spirits good today  -- continues on venlafaxine 37.5 mg daily    Chronic Osteomyelitis   Of R humeral head  -- continues on suppressive abx with amoxicillin 250 mg daily    MARI  -- continues on CPAP with home settings    Slow Transit Constipation  -- continues onSenna-S 4 tabs daily PRN-- adjust bowel regimen as needed    Physical Deconditioning  In setting of hospitalization and underlying medical conditions  -- ongoing PT/OT      Electronically signed by:  Bailey Salinas MD

## 2018-07-27 NOTE — LETTER
7/27/2018        RE: Bijan Daniel  [address]  [id number]  [facility] [address]        [facility]  INITIAL VISIT NOTE  July 27, 2018    PRIMARY CARE PROVIDER AND CLINIC:  Oc Randall [address] [facility] [address]    Chief Complaint   Patient presents with     Hospital F/U       HPI:    Bijan Daniel is a 83 year old  (8/12/1934) male who [facility] July 27, 2018 for an initial visit. Medical history is notable for atrial fibrillation and COPD. He was hospitalized at [place] from 7/6/18 to 7/7/18 where he presented with a dislocated prosthetic left hip. He was registered to [facility] overnight where he became hypotensive and developed abdominal pain. Lactate markedly elevated and imaging concerning for bowel ischemia. He was transferred to [facility] and was hospitalized there from 7/7/18 to 7/15/18 where etiology of septic shock was never elucidated, though he did have a LLL infiltrate and urine culture with >100k E. Coli.  He is s/p ex lap on day of transfer to [facility] which did not reveal any bowel ischemia. He did have adhesions from prior appendectomy and thought was these could have caused some transient bowel ischemia. He was admitted to this facility for medical management and rehab.     Today, Mr. Daniel is seen in his room after breakfast and therapy. Overall is doing OK. Still feels like he has quite a bit of strength to get back. He had to wear a similar abductor brace on his left hip for six weeks three years ago after dislocating it, and is hoping for similar plan this time. No abdominal pain. Bowel movements are regular, maybe a little firm. No chest pain or dyspnea. Sleeping well. Appetite is good. Working with therapies. No concerns per nursing.     CODE STATUS:   CPR/Full code     ALLERGIES:     Allergies   Allergen Reactions     Nuts Anaphylaxis       PAST MEDICAL HISTORY:   No past medical history on file.    PAST  "SURGICAL HISTORY:   Past Surgical History:   Procedure Laterality Date     APPENDECTOMY Right      AS TOTAL HIP ARTHROPLASTY Right 2008     AS TOTAL HIP ARTHROPLASTY Left 2009     C ANESTH,SHOULDER REPLACEMENT       removal shoulder arthroplasty         FAMILY HISTORY:   Reviewed and non-contributory    SOCIAL HISTORY:   Lives with spouse     MEDICATIONS:  Current Outpatient Prescriptions   Medication Sig Dispense Refill     ACETAMINOPHEN PO Take 1,000 mg by mouth 3 times daily        amoxicillin (AMOXIL) 250 MG capsule Take 250 mg by mouth daily        Apixaban (ELIQUIS PO) Take 5 mg by mouth 2 times daily       Calcium (OYSTER-BARBARA 500 PO) Take 2 tablets by mouth 2 times daily       ipratropium (ATROVENT) 0.06 % spray Spray 2 sprays into both nostrils 4 times daily as needed for rhinitis 1 Box 1     metoprolol (LOPRESSOR) 100 MG tablet TAKE 1 TABLET ORALLY TWICE DAILY       omeprazole (PRILOSEC) 20 MG CR capsule Take 40 mg by mouth daily        senna-docusate (SENOKOT-S;PERICOLACE) 8.6-50 MG per tablet Take 4 tablets by mouth daily as needed        THIAMINE HCL PO Take 100 mg by mouth daily       venlafaxine (EFFEXOR-XR) 37.5 MG 24 hr capsule Take 1 capsule (37.5 mg) by mouth daily 90 capsule 3       Post Discharge Medication Reconciliation Status: medication reconcilation previously completed during another office visit.    ROS:  10 point ROS neg other than the symptoms noted above in the HPI.    PHYSICAL EXAM:  /79  Pulse 71  Temp 95.4  F (35.2  C)  Resp 15  Ht 6' 2\" (1.88 m)  Wt 204 lb 9.6 oz (92.8 kg)  SpO2 97%  BMI 26.27 kg/m2  Gen: sitting in wheelchair, alert, cooperative and in no acute distress  HEENT: normocephalic; hard of hearing  Card: RRR, S1, S2, no murmurs  Resp: lungs clear to auscultation bilaterally, no crackles or wheezes  GI: abdomen soft, not-tender, non-distended  MSK: normal muscle tone, no LE edema with knee high DARRON stockings in place; left hip abductor brace in place   Neuro: " CX II-XII grossly in tact; ROM in all four extremities grossly in tact, though RUE weakness noted  Psych: alert and oriented x3; normal affect  Skin: midline abdominal incision is well healed    LABORATORY/IMAGING DATA:  Reviewed as per Epic    ASSESSMENT/PLAN:    Septic Shock  Unclear etiology. ?if some transient bowel ischemia in setting of intraabdominal adhesions. He is s/p ex lap which was unrevealing. He also had a CT chest with LLL infiltrate and a urine culture with >100k E Coli. Now, afebrile. No abdominal pain. Ex lap incision is healing well  -- follow clinically    Prosthetic Left Hip Dislocation  This was the reason he initially presented to the [place] ER on 7/6.   -- hip abductor brace to be worn when out of bed    Chronic Atrial Fibrillation  HR 60s-80s  -- rate controlled with metoprolol 100 mg BID  -- anticoagulated with apixaban 5 mg BID    COPD  No signs of exacerbation. Not on any controller meds.   -- follow clinically    Depression / Anxiety  Mood and spirits good today  -- continues on venlafaxine 37.5 mg daily    Chronic Osteomyelitis   Of R humeral head  -- continues on suppressive abx with amoxicillin 250 mg daily    MARI  -- continues on CPAP with home settings    Slow Transit Constipation  -- continues onSenna-S 4 tabs daily PRN-- adjust bowel regimen as needed    Physical Deconditioning  In setting of hospitalization and underlying medical conditions  -- ongoing PT/OT      Electronically signed by:  Bailey Salinas MD                    Sincerely,        Bailey Salinas MD

## 2018-07-30 ENCOUNTER — NURSING HOME VISIT (OUTPATIENT)
Dept: GERIATRICS | Facility: CLINIC | Age: 83
End: 2018-07-30
Payer: MEDICARE

## 2018-07-30 VITALS
TEMPERATURE: 97.1 F | DIASTOLIC BLOOD PRESSURE: 83 MMHG | OXYGEN SATURATION: 96 % | SYSTOLIC BLOOD PRESSURE: 123 MMHG | HEART RATE: 71 BPM | BODY MASS INDEX: 24.35 KG/M2 | HEIGHT: 74 IN | WEIGHT: 189.7 LBS | RESPIRATION RATE: 16 BRPM

## 2018-07-30 DIAGNOSIS — J44.9 CHRONIC OBSTRUCTIVE PULMONARY DISEASE, UNSPECIFIED COPD TYPE (H): ICD-10-CM

## 2018-07-30 DIAGNOSIS — E87.70 HYPERVOLEMIA, UNSPECIFIED HYPERVOLEMIA TYPE: ICD-10-CM

## 2018-07-30 DIAGNOSIS — I48.20 CHRONIC ATRIAL FIBRILLATION (H): Primary | ICD-10-CM

## 2018-07-30 DIAGNOSIS — J30.0 CHRONIC VASOMOTOR RHINITIS: ICD-10-CM

## 2018-07-30 PROCEDURE — 99309 SBSQ NF CARE MODERATE MDM 30: CPT | Performed by: NURSE PRACTITIONER

## 2018-07-30 NOTE — PROGRESS NOTES
[facility]    Chief Complaint   Patient presents with     long-term Acute     [facility] Medical Record Number:  [id number]    HPI:    Bijan Daniel is a 83 year old  (8/12/1934), who is being seen today for an episodic care visit at [facility]. HPI information obtained from: facility chart records, facility staff, patient report and [facility] chart review. Today's concern is:    Chronic Atrial Fibrillation on Anticoagulation. No reports of heart palpitations. Upon review of documentation, heart rate range from 59-91, most in the 70s.    Chronic Obstructive Pulmonary Disease. Complains of a cough. Non-productive. Has been present since at least during hospitalization. Denies shortness of breath. Had an incentive spirometer during hospitalization, but does not have in the TCU.     Hypervolemia. Today, weight 205.5 lbs. Previous weight 198.7 lbs on 7/29/18. Most recent weights while in TCU ~ 202 lbs.      ALLERGIES: Nuts  Past Medical, Surgical, Family and Social History reviewed and updated in [facility].    Current Outpatient Prescriptions   Medication Sig Dispense Refill     ACETAMINOPHEN PO Take 1,000 mg by mouth 3 times daily        amoxicillin (AMOXIL) 250 MG capsule Take 250 mg by mouth daily        Apixaban (ELIQUIS PO) Take 5 mg by mouth 2 times daily       Calcium (OYSTER-BARBARA 500 PO) Take 2 tablets by mouth 2 times daily       FUROSEMIDE PO Take 20 mg by mouth daily       ipratropium (ATROVENT) 0.06 % spray Spray 1-2 sprays into both nostrils 4 times daily as needed for rhinitis       metoprolol (LOPRESSOR) 100 MG tablet TAKE 1 TABLET ORALLY TWICE DAILY       omeprazole (PRILOSEC) 20 MG CR capsule Take 40 mg by mouth daily        senna-docusate (SENOKOT-S;PERICOLACE) 8.6-50 MG per tablet Take 4 tablets by mouth daily as needed        THIAMINE HCL PO Take 100 mg by mouth daily       venlafaxine (EFFEXOR-XR) 37.5 MG 24 hr capsule Take 1 capsule (37.5 mg) by mouth  "daily 90 capsule 3     Medications reviewed:  Medications reconciled to facility chart and changes were made to reflect current medications as identified as above med list. Below are the changes that were made:   Medications stopped since last EPIC medication reconciliation:   There are no discontinued medications.    Medications started since last [facility] medication reconciliation:  No orders of the defined types were placed in this encounter.    REVIEW OF SYSTEMS:  4 point ROS including Respiratory, CV, GI and , other than that noted in the HPI,  is negative    Physical Exam:  /83  Pulse 71  Temp 97.1  F (36.2  C)  Resp 16  Ht 6' 2\" (1.88 m)  Wt 189 lb 11.2 oz (86 kg)  SpO2 96%  BMI 24.36 kg/m2  GENERAL APPEARANCE:  Alert, in no distress  ENT:  Mouth and posterior oropharynx normal, moist mucous membranes  EYES:  EOM, conjunctivae, lids, pupils and irises normal  RESP:  respiratory effort and palpation of chest normal, lungs clear to auscultation , no respiratory distress  CV:  Palpation and auscultation of heart done, irregular rhythm regular rate  ABDOMEN:  normal bowel sounds, soft, nontender, no hepatosplenomegaly or other masses  M/S:   Active movement of bilateral upper and lower extremities. Hip abduction brace. Compression stockings on BLE.  SKIN:  Inspection of skin and subcutaneous tissue baseline, Palpation of skin and subcutaneous tissue baseline  NEURO:   Cranial nerves 2-12 are normal tested and grossly at patient's baseline  PSYCH:  affect and mood normal    Recent Labs:   Last Comprehensive Metabolic Panel:  Sodium   Date Value Ref Range Status   07/23/2018 135 (A) 136 - 145 mmol/L Final     Potassium   Date Value Ref Range Status   07/23/2018 4.7 3.5 - 5.0 mmol/L Final     Chloride   Date Value Ref Range Status   07/23/2018 105 98 - 107 mmol/L Final     Carbon Dioxide   Date Value Ref Range Status   07/23/2018 21 (A) 22 - 31 mmol/L Final     Anion Gap   Date Value Ref Range Status "   07/23/2018 9 5 - 18 mmol/L Final     Glucose   Date Value Ref Range Status   07/23/2018 113 70 - 125 mg/dL Final     Urea Nitrogen   Date Value Ref Range Status   07/23/2018 14 8 - 28 mg/dL Final     Creatinine   Date Value Ref Range Status   07/23/2018 0.69 (A) 0.70 - 1.30 mg/dL Final     GFR Estimate   Date Value Ref Range Status   07/23/2018 >60 >60 mL/min/1.73m2 Final     Calcium   Date Value Ref Range Status   07/23/2018 8.3 (A) 8.5 - 10.5 mg/dL Final     Lab Results   Component Value Date    WBC 13.1 07/24/2018     Lab Results   Component Value Date    RBC 3.80 07/24/2018     Lab Results   Component Value Date    HGB 12.8 07/24/2018     Lab Results   Component Value Date    HCT 37.6 07/24/2018     Lab Results   Component Value Date    MCV 99 07/24/2018     Lab Results   Component Value Date    MCH 33.7 07/24/2018     Lab Results   Component Value Date    MCHC 34.0 07/24/2018     Lab Results   Component Value Date    RDW 15.1 07/24/2018     Lab Results   Component Value Date     07/24/2018     Assessment/Plan:  Chronic Atrial Fibrillation on Anticoagulation. Regular rate. Continue Metoprolol and Apixaban as ordered.    Chronic Obstructive Pulmonary Disease. Cough likely related to COPD. Lung sounds clear. May consider etiology of hypervolemia, but less likely given clear lung sounds. Continue to monitor.    Hypervolemia. Secondary to septic shock and fluid resuscitation during hospitalization. EF 55-60% on 7/8/18. As weights have been trending up and edema remains in the BLE, will order Furosemide 20 mg PO x 3 days. Further plans pending results.    Electronically signed by  HOLLI Alexandra CNP

## 2018-07-30 NOTE — LETTER
7/30/2018        RE: Bijan Daniel  [address] [place] [address] [address]  [address]  [facility] [address]        [facility]    Chief Complaint   Patient presents with     halfway Acute     [facility] Medical Record Number:  [id number]    HPI:    Bijan Daniel is a 83 year old  (8/12/1934), who is being seen today for an episodic care visit at [facility]. HPI information obtained from: facility chart records, facility staff, patient report and [facility] chart review. Today's concern is:    Chronic Atrial Fibrillation on Anticoagulation. No reports of heart palpitations. Upon review of documentation, heart rate range from 59-91, most in the 70s.    Chronic Obstructive Pulmonary Disease. Complains of a cough. Non-productive. Has been present since at least during hospitalization. Denies shortness of breath. Had an incentive spirometer during hospitalization, but does not have in the TCU.     Hypervolemia. Today, weight 205.5 lbs. Previous weight 198.7 lbs on 7/29/18. Most recent weights while in TCU ~ 202 lbs.      ALLERGIES: Nuts  Past Medical, Surgical, Family and Social History reviewed and updated in [facility].    Current Outpatient Prescriptions   Medication Sig Dispense Refill     ACETAMINOPHEN PO Take 1,000 mg by mouth 3 times daily        amoxicillin (AMOXIL) 250 MG capsule Take 250 mg by mouth daily        Apixaban (ELIQUIS PO) Take 5 mg by mouth 2 times daily       Calcium (OYSTER-BARBARA 500 PO) Take 2 tablets by mouth 2 times daily       FUROSEMIDE PO Take 20 mg by mouth daily       ipratropium (ATROVENT) 0.06 % spray Spray 1-2 sprays into both nostrils 4 times daily as needed for rhinitis       metoprolol (LOPRESSOR) 100 MG tablet TAKE 1 TABLET ORALLY TWICE DAILY       omeprazole (PRILOSEC) 20 MG CR capsule Take 40 mg by mouth daily        senna-docusate (SENOKOT-S;PERICOLACE) 8.6-50 MG per tablet Take 4 tablets by mouth daily as needed        THIAMINE HCL PO Take  "100 mg by mouth daily       venlafaxine (EFFEXOR-XR) 37.5 MG 24 hr capsule Take 1 capsule (37.5 mg) by mouth daily 90 capsule 3     Medications reviewed:  Medications reconciled to facility chart and changes were made to reflect current medications as identified as above med list. Below are the changes that were made:   Medications stopped since last EPIC medication reconciliation:   There are no discontinued medications.    Medications started since last [facility] medication reconciliation:  No orders of the defined types were placed in this encounter.    REVIEW OF SYSTEMS:  4 point ROS including Respiratory, CV, GI and , other than that noted in the HPI,  is negative    Physical Exam:  /83  Pulse 71  Temp 97.1  F (36.2  C)  Resp 16  Ht 6' 2\" (1.88 m)  Wt 189 lb 11.2 oz (86 kg)  SpO2 96%  BMI 24.36 kg/m2  GENERAL APPEARANCE:  Alert, in no distress  ENT:  Mouth and posterior oropharynx normal, moist mucous membranes  EYES:  EOM, conjunctivae, lids, pupils and irises normal  RESP:  respiratory effort and palpation of chest normal, lungs clear to auscultation , no respiratory distress  CV:  Palpation and auscultation of heart done, irregular rhythm regular rate  ABDOMEN:  normal bowel sounds, soft, nontender, no hepatosplenomegaly or other masses  M/S:   Active movement of bilateral upper and lower extremities. Hip abduction brace. Compression stockings on BLE.  SKIN:  Inspection of skin and subcutaneous tissue baseline, Palpation of skin and subcutaneous tissue baseline  NEURO:   Cranial nerves 2-12 are normal tested and grossly at patient's baseline  PSYCH:  affect and mood normal    Recent Labs:   Last Comprehensive Metabolic Panel:  Sodium   Date Value Ref Range Status   07/23/2018 135 (A) 136 - 145 mmol/L Final     Potassium   Date Value Ref Range Status   07/23/2018 4.7 3.5 - 5.0 mmol/L Final     Chloride   Date Value Ref Range Status   07/23/2018 105 98 - 107 mmol/L Final     Carbon Dioxide   Date " Value Ref Range Status   07/23/2018 21 (A) 22 - 31 mmol/L Final     Anion Gap   Date Value Ref Range Status   07/23/2018 9 5 - 18 mmol/L Final     Glucose   Date Value Ref Range Status   07/23/2018 113 70 - 125 mg/dL Final     Urea Nitrogen   Date Value Ref Range Status   07/23/2018 14 8 - 28 mg/dL Final     Creatinine   Date Value Ref Range Status   07/23/2018 0.69 (A) 0.70 - 1.30 mg/dL Final     GFR Estimate   Date Value Ref Range Status   07/23/2018 >60 >60 mL/min/1.73m2 Final     Calcium   Date Value Ref Range Status   07/23/2018 8.3 (A) 8.5 - 10.5 mg/dL Final     Lab Results   Component Value Date    WBC 13.1 07/24/2018     Lab Results   Component Value Date    RBC 3.80 07/24/2018     Lab Results   Component Value Date    HGB 12.8 07/24/2018     Lab Results   Component Value Date    HCT 37.6 07/24/2018     Lab Results   Component Value Date    MCV 99 07/24/2018     Lab Results   Component Value Date    MCH 33.7 07/24/2018     Lab Results   Component Value Date    MCHC 34.0 07/24/2018     Lab Results   Component Value Date    RDW 15.1 07/24/2018     Lab Results   Component Value Date     07/24/2018     Assessment/Plan:  Chronic Atrial Fibrillation on Anticoagulation. Regular rate. Continue Metoprolol and Apixaban as ordered.    Chronic Obstructive Pulmonary Disease. Cough likely related to COPD. Lung sounds clear. May consider etiology of hypervolemia, but less likely given clear lung sounds. Continue to monitor.    Hypervolemia. Secondary to septic shock and fluid resuscitation during hospitalization. EF 55-60% on 7/8/18. As weights have been trending up and edema remains in the BLE, will order Furosemide 20 mg PO x 3 days. Further plans pending results.    Electronically signed by  HOLLI Alexandra CNP          Sincerely,        HOLLI Alexandra CNP

## 2018-07-31 RX ORDER — IPRATROPIUM BROMIDE 42 UG/1
1-2 SPRAY, METERED NASAL 4 TIMES DAILY PRN
Start: 2018-07-31 | End: 2020-02-19

## 2018-08-02 ENCOUNTER — NURSING HOME VISIT (OUTPATIENT)
Dept: GERIATRICS | Facility: CLINIC | Age: 83
End: 2018-08-02
Payer: MEDICARE

## 2018-08-02 VITALS
SYSTOLIC BLOOD PRESSURE: 118 MMHG | HEIGHT: 74 IN | BODY MASS INDEX: 26.46 KG/M2 | OXYGEN SATURATION: 96 % | DIASTOLIC BLOOD PRESSURE: 83 MMHG | HEART RATE: 65 BPM | WEIGHT: 206.2 LBS | RESPIRATION RATE: 17 BRPM | TEMPERATURE: 98.2 F

## 2018-08-02 DIAGNOSIS — D72.829 LEUKOCYTOSIS, UNSPECIFIED TYPE: ICD-10-CM

## 2018-08-02 DIAGNOSIS — Z51.89 ENCOUNTER FOR WOUND RE-CHECK: Primary | ICD-10-CM

## 2018-08-02 DIAGNOSIS — K59.00 CONSTIPATION, UNSPECIFIED CONSTIPATION TYPE: ICD-10-CM

## 2018-08-02 PROCEDURE — 99309 SBSQ NF CARE MODERATE MDM 30: CPT | Performed by: NURSE PRACTITIONER

## 2018-08-02 NOTE — PROGRESS NOTES
[facility]    Chief Complaint   Patient presents with     FREDERIC     [facility] Medical Record Number:  [id number]    HPI:    Bijan Daniel is a 83 year old  (8/12/1934), who is being seen today for an episodic care visit at [facility].  HPI information obtained from: facility chart records, facility staff, patient report and [facility] chart review. Today's concern is:    Wound Re-check. Per patient report, believes a staple must have been left in his belly button. States all other staples were removed except the one in his belly button. Denies abdominal pain.    Leukocytosis. WBC 13.1 on 7/24/18. Previous WBC 15.8 on 7/14/18 during hospitalization. Treated for septic shock secondary to an E. Coli UTI during hospitalization.    Constipation. Used a Senna-S on 7/30/18 due to constipation. Reports bowels are now moving regularly. Does not want Senna-S scheduled.Upon review of documentation, has had 3 bowel movements in the past 5 days.     ALLERGIES: Nuts  Past Medical, Surgical, Family and Social History reviewed and updated in [facility].    Current Outpatient Prescriptions   Medication Sig Dispense Refill     ACETAMINOPHEN PO Take 1,000 mg by mouth 3 times daily        amoxicillin (AMOXIL) 250 MG capsule Take 250 mg by mouth daily        Apixaban (ELIQUIS PO) Take 5 mg by mouth 2 times daily       Calcium (OYSTER-BARBARA 500 PO) Take 2 tablets by mouth 2 times daily       DIPHENHYDRAMINE HCL PO Take 25 mg by mouth 2 times daily as needed       FUROSEMIDE PO Take 20 mg by mouth daily       ipratropium (ATROVENT) 0.06 % spray Spray 1-2 sprays into both nostrils 4 times daily as needed for rhinitis       metoprolol (LOPRESSOR) 100 MG tablet TAKE 1 TABLET ORALLY TWICE DAILY       omeprazole (PRILOSEC) 20 MG CR capsule Take 40 mg by mouth daily        senna-docusate (SENOKOT-S;PERICOLACE) 8.6-50 MG per tablet Take 4 tablets by mouth daily as needed        THIAMINE HCL PO Take 100  "mg by mouth daily       venlafaxine (EFFEXOR-XR) 37.5 MG 24 hr capsule Take 1 capsule (37.5 mg) by mouth daily 90 capsule 3     [DISCONTINUED] FUROSEMIDE PO Take 20 mg by mouth daily       Medications reviewed:  Medications reconciled to facility chart and changes were made to reflect current medications as identified as above med list. Below are the changes that were made:   Medications stopped since last EPIC medication reconciliation:   Medications Discontinued During This Encounter   Medication Reason     FUROSEMIDE PO Medication Reconciliation Clean Up     Medications started since last [facility] medication reconciliation:  Orders Placed This Encounter   Medications     DIPHENHYDRAMINE HCL PO     Sig: Take 25 mg by mouth 2 times daily as needed     FUROSEMIDE PO     Sig: Take 20 mg by mouth daily     REVIEW OF SYSTEMS:  4 point ROS including Respiratory, CV, GI and , other than that noted in the HPI,  is negative    Physical Exam:  /83  Pulse 65  Temp 98.2  F (36.8  C)  Resp 17  Ht 6' 2\" (1.88 m)  Wt 206 lb 3.2 oz (93.5 kg)  SpO2 96%  BMI 26.47 kg/m2  GENERAL APPEARANCE:  Alert, in no distress  ENT:  Mouth and posterior oropharynx normal, moist mucous membranes  EYES:  EOM, conjunctivae, lids, pupils and irises normal  RESP:  respiratory effort and palpation of chest normal, lungs clear to auscultation , no respiratory distress  CV:  Palpation and auscultation of heart done, irregular rhythm regular rate  ABDOMEN:  normal bowel sounds, soft, nontender, no hepatosplenomegaly or other masses  M/S:   Active movement of bilateral upper and lower extremities. Hip abduction brace. Compression stockings on BLE.  SKIN:  Black, hard linear scab in appearance extending through umbilicus. No drainage.  NEURO:   Cranial nerves 2-12 are normal tested and grossly at patient's baseline  PSYCH:  affect and mood normal    Recent Labs:   Last Comprehensive Metabolic Panel:  Sodium   Date Value Ref Range Status "   07/23/2018 135 (A) 136 - 145 mmol/L Final     Potassium   Date Value Ref Range Status   07/23/2018 4.7 3.5 - 5.0 mmol/L Final     Chloride   Date Value Ref Range Status   07/23/2018 105 98 - 107 mmol/L Final     Carbon Dioxide   Date Value Ref Range Status   07/23/2018 21 (A) 22 - 31 mmol/L Final     Anion Gap   Date Value Ref Range Status   07/23/2018 9 5 - 18 mmol/L Final     Glucose   Date Value Ref Range Status   07/23/2018 113 70 - 125 mg/dL Final     Urea Nitrogen   Date Value Ref Range Status   07/23/2018 14 8 - 28 mg/dL Final     Creatinine   Date Value Ref Range Status   07/23/2018 0.69 (A) 0.70 - 1.30 mg/dL Final     GFR Estimate   Date Value Ref Range Status   07/23/2018 >60 >60 mL/min/1.73m2 Final     Calcium   Date Value Ref Range Status   07/23/2018 8.3 (A) 8.5 - 10.5 mg/dL Final     Lab Results   Component Value Date    WBC 13.1 07/24/2018     Lab Results   Component Value Date    RBC 3.80 07/24/2018     Lab Results   Component Value Date    HGB 12.8 07/24/2018     Lab Results   Component Value Date    HCT 37.6 07/24/2018     Lab Results   Component Value Date    MCV 99 07/24/2018     Lab Results   Component Value Date    MCH 33.7 07/24/2018     Lab Results   Component Value Date    MCHC 34.0 07/24/2018     Lab Results   Component Value Date    RDW 15.1 07/24/2018     Lab Results   Component Value Date     07/24/2018     Assessment/Plan:  Wound Re-Check. Difficult to discern if umbilicus still has sutures or a scab. Contacted Dr. Mckayla Hastings, Surgeon, office to clarify. Staff report sutures were not used. Will continue to monitor abdominal incision. Staples removed surrounding umbilicus. No s/s of infection.     Leukocytosis. Likely secondary to E. Coli UTI. Trending down since hospitalization. Will repeat CBC with differential on 8/6/18 to ensure stability.     Constipation. Per patient report, had trouble having a bowel movement, but does not want Senna-S scheduled. Will continue Senna-S  PRN as ordered.    Electronically signed by  HOLLI Alexandra CNP

## 2018-08-02 NOTE — LETTER
8/2/2018        RE: Bijan Daniel  [address] [place] [address] [address]  [phone]  [facility] [address]        [facility]    Chief Complaint   Patient presents with     RECHECK     [facility] Medical Record Number:  [id number]    HPI:    Bijan Daniel is a 83 year old  (8/12/1934), who is being seen today for an episodic care visit at [facility] at [facility].  HPI information obtained from: facility chart records, facility staff, patient report and [facility] chart review. Today's concern is:    Wound Re-check. Per patient report, believes a staple must have been left in his belly button. States all other staples were removed except the one in his belly button. Denies abdominal pain.    Leukocytosis. WBC 13.1 on 7/24/18. Previous WBC 15.8 on 7/14/18 during hospitalization. Treated for septic shock secondary to an E. Coli UTI during hospitalization.    Constipation. Used a Senna-S on 7/30/18 due to constipation. Reports bowels are now moving regularly. Does not want Senna-S scheduled.Upon review of documentation, has had 3 bowel movements in the past 5 days.     ALLERGIES: Nuts  Past Medical, Surgical, Family and Social History reviewed and updated in [facility].    Current Outpatient Prescriptions   Medication Sig Dispense Refill     ACETAMINOPHEN PO Take 1,000 mg by mouth 3 times daily        amoxicillin (AMOXIL) 250 MG capsule Take 250 mg by mouth daily        Apixaban (ELIQUIS PO) Take 5 mg by mouth 2 times daily       Calcium (OYSTER-BARBARA 500 PO) Take 2 tablets by mouth 2 times daily       DIPHENHYDRAMINE HCL PO Take 25 mg by mouth 2 times daily as needed       FUROSEMIDE PO Take 20 mg by mouth daily       ipratropium (ATROVENT) 0.06 % spray Spray 1-2 sprays into both nostrils 4 times daily as needed for rhinitis       metoprolol (LOPRESSOR) 100 MG tablet TAKE 1 TABLET ORALLY TWICE DAILY       omeprazole (PRILOSEC) 20 MG CR capsule Take 40 mg by mouth daily        senna-docusate  "(SENOKOT-S;PERICOLACE) 8.6-50 MG per tablet Take 4 tablets by mouth daily as needed        THIAMINE HCL PO Take 100 mg by mouth daily       venlafaxine (EFFEXOR-XR) 37.5 MG 24 hr capsule Take 1 capsule (37.5 mg) by mouth daily 90 capsule 3     [DISCONTINUED] FUROSEMIDE PO Take 20 mg by mouth daily       Medications reviewed:  Medications reconciled to facility chart and changes were made to reflect current medications as identified as above med list. Below are the changes that were made:   Medications stopped since last EPIC medication reconciliation:   Medications Discontinued During This Encounter   Medication Reason     FUROSEMIDE PO Medication Reconciliation Clean Up     Medications started since last [facility] medication reconciliation:  Orders Placed This Encounter   Medications     DIPHENHYDRAMINE HCL PO     Sig: Take 25 mg by mouth 2 times daily as needed     FUROSEMIDE PO     Sig: Take 20 mg by mouth daily     REVIEW OF SYSTEMS:  4 point ROS including Respiratory, CV, GI and , other than that noted in the HPI,  is negative    Physical Exam:  /83  Pulse 65  Temp 98.2  F (36.8  C)  Resp 17  Ht 6' 2\" (1.88 m)  Wt 206 lb 3.2 oz (93.5 kg)  SpO2 96%  BMI 26.47 kg/m2  GENERAL APPEARANCE:  Alert, in no distress  ENT:  Mouth and posterior oropharynx normal, moist mucous membranes  EYES:  EOM, conjunctivae, lids, pupils and irises normal  RESP:  respiratory effort and palpation of chest normal, lungs clear to auscultation , no respiratory distress  CV:  Palpation and auscultation of heart done, irregular rhythm regular rate  ABDOMEN:  normal bowel sounds, soft, nontender, no hepatosplenomegaly or other masses  M/S:   Active movement of bilateral upper and lower extremities. Hip abduction brace. Compression stockings on BLE.  SKIN:  Black, hard linear scab in appearance extending through umbilicus. No drainage.  NEURO:   Cranial nerves 2-12 are normal tested and grossly at patient's baseline  PSYCH:  affect " and mood normal    Recent Labs:   Last Comprehensive Metabolic Panel:  Sodium   Date Value Ref Range Status   07/23/2018 135 (A) 136 - 145 mmol/L Final     Potassium   Date Value Ref Range Status   07/23/2018 4.7 3.5 - 5.0 mmol/L Final     Chloride   Date Value Ref Range Status   07/23/2018 105 98 - 107 mmol/L Final     Carbon Dioxide   Date Value Ref Range Status   07/23/2018 21 (A) 22 - 31 mmol/L Final     Anion Gap   Date Value Ref Range Status   07/23/2018 9 5 - 18 mmol/L Final     Glucose   Date Value Ref Range Status   07/23/2018 113 70 - 125 mg/dL Final     Urea Nitrogen   Date Value Ref Range Status   07/23/2018 14 8 - 28 mg/dL Final     Creatinine   Date Value Ref Range Status   07/23/2018 0.69 (A) 0.70 - 1.30 mg/dL Final     GFR Estimate   Date Value Ref Range Status   07/23/2018 >60 >60 mL/min/1.73m2 Final     Calcium   Date Value Ref Range Status   07/23/2018 8.3 (A) 8.5 - 10.5 mg/dL Final     Lab Results   Component Value Date    WBC 13.1 07/24/2018     Lab Results   Component Value Date    RBC 3.80 07/24/2018     Lab Results   Component Value Date    HGB 12.8 07/24/2018     Lab Results   Component Value Date    HCT 37.6 07/24/2018     Lab Results   Component Value Date    MCV 99 07/24/2018     Lab Results   Component Value Date    MCH 33.7 07/24/2018     Lab Results   Component Value Date    MCHC 34.0 07/24/2018     Lab Results   Component Value Date    RDW 15.1 07/24/2018     Lab Results   Component Value Date     07/24/2018     Assessment/Plan:  Wound Re-Check. Difficult to discern if umbilicus still has sutures or a scab. Contacted Dr. Mckayla Hastings, Surgeon, office to clarify. Staff report sutures were not used. Will continue to monitor abdominal incision. Staples removed surrounding umbilicus. No s/s of infection.     Leukocytosis. Likely secondary to E. Coli UTI. Trending down since hospitalization. Will repeat CBC with differential on 8/6/18 to ensure stability.     Constipation. Per  patient report, had trouble having a bowel movement, but does not want Senna-S scheduled. Will continue Senna-S PRN as ordered.    Electronically signed by  HOLLI Alexandra CNP        Sincerely,        HOLLI Alexandra CNP

## 2018-08-06 ENCOUNTER — RECORDS - HEALTHEAST (OUTPATIENT)
Dept: LAB | Facility: CLINIC | Age: 83
End: 2018-08-06

## 2018-08-06 ENCOUNTER — NURSING HOME VISIT (OUTPATIENT)
Dept: GERIATRICS | Facility: CLINIC | Age: 83
End: 2018-08-06
Payer: MEDICARE

## 2018-08-06 ENCOUNTER — TRANSFERRED RECORDS (OUTPATIENT)
Dept: HEALTH INFORMATION MANAGEMENT | Facility: CLINIC | Age: 83
End: 2018-08-06

## 2018-08-06 VITALS
WEIGHT: 203 LBS | HEART RATE: 76 BPM | BODY MASS INDEX: 26.06 KG/M2 | DIASTOLIC BLOOD PRESSURE: 89 MMHG | SYSTOLIC BLOOD PRESSURE: 123 MMHG | OXYGEN SATURATION: 96 %

## 2018-08-06 DIAGNOSIS — I48.20 CHRONIC ATRIAL FIBRILLATION (H): ICD-10-CM

## 2018-08-06 DIAGNOSIS — S73.005D CLOSED DISLOCATION OF LEFT HIP, SUBSEQUENT ENCOUNTER: Primary | ICD-10-CM

## 2018-08-06 DIAGNOSIS — D72.829 LEUKOCYTOSIS, UNSPECIFIED TYPE: ICD-10-CM

## 2018-08-06 LAB
ANION GAP SERPL CALCULATED.3IONS-SCNC: 9 MMOL/L (ref 5–18)
ANION GAP SERPL CALCULATED.3IONS-SCNC: 9 MMOL/L (ref 5–18)
BASOPHILS # BLD AUTO: 0 THOU/UL (ref 0–0.2)
BASOPHILS NFR BLD AUTO: 0 % (ref 0–2)
BUN SERPL-MCNC: 12 MG/DL (ref 8–28)
BUN SERPL-MCNC: 12 MG/DL (ref 8–28)
CALCIUM SERPL-MCNC: 8.1 MG/DL (ref 8.5–10.5)
CALCIUM SERPL-MCNC: 8.1 MG/DL (ref 8.5–10.5)
CHLORIDE BLD-SCNC: 106 MMOL/L (ref 98–107)
CHLORIDE SERPLBLD-SCNC: 106 MMOL/L (ref 98–107)
CO2 SERPL-SCNC: 21 MMOL/L (ref 22–31)
CO2 SERPL-SCNC: 21 MMOL/L (ref 22–31)
CREAT SERPL-MCNC: 0.64 MG/DL (ref 0.7–1.3)
CREAT SERPL-MCNC: 0.64 MG/DL (ref 0.7–1.3)
DIFFERENTIAL: ABNORMAL
EOSINOPHIL # BLD AUTO: 0.1 THOU/UL (ref 0–0.4)
EOSINOPHIL NFR BLD AUTO: 1 % (ref 0–6)
ERYTHROCYTE [DISTWIDTH] IN BLOOD BY AUTOMATED COUNT: 15.5 % (ref 11–14.5)
ERYTHROCYTE [DISTWIDTH] IN BLOOD BY AUTOMATED COUNT: 15.5 % (ref 11–14.5)
GFR SERPL CREATININE-BSD FRML MDRD: >60 ML/MIN/1.73M2
GFR SERPL CREATININE-BSD FRML MDRD: >60 ML/MIN/1.73M2
GLUCOSE BLD-MCNC: 64 MG/DL (ref 70–125)
GLUCOSE SERPL-MCNC: 64 MG/DL (ref 70–125)
HCT VFR BLD AUTO: 37.7 % (ref 40–54)
HCT VFR BLD AUTO: 37.7 % (ref 40–54)
HEMOGLOBIN: 12.4 G/DL (ref 14–18)
HGB BLD-MCNC: 12.4 G/DL (ref 14–18)
LYMPHOCYTES # BLD AUTO: 4.7 THOU/UL (ref 0.8–4.4)
LYMPHOCYTES NFR BLD AUTO: 47 % (ref 20–40)
MCH RBC QN AUTO: 33.1 PG (ref 27–34)
MCH RBC QN AUTO: 33.1 PG (ref 27–34)
MCHC RBC AUTO-ENTMCNC: 32.9 G/DL (ref 32–36)
MCHC RBC AUTO-ENTMCNC: 32.9 G/DL (ref 32–36)
MCV RBC AUTO: 101 FL (ref 80–100)
MCV RBC AUTO: 101 FL (ref 80–100)
MONOCYTES # BLD AUTO: 0.6 THOU/UL (ref 0–0.9)
MONOCYTES NFR BLD AUTO: 6 % (ref 2–10)
NEUTROPHILS # BLD AUTO: 4.5 THOU/UL (ref 2–7.7)
NEUTROPHILS NFR BLD AUTO: 46 % (ref 50–70)
PLATELET # BLD AUTO: 159 THOU/UL (ref 140–440)
PLATELET # BLD AUTO: 159 THOU/UL (ref 140–440)
PMV BLD AUTO: 9.5 FL (ref 8.5–12.5)
POTASSIUM BLD-SCNC: 4 MMOL/L (ref 3.5–5)
POTASSIUM SERPL-SCNC: 4 MMOL/L (ref 3.5–5)
RBC # BLD AUTO: 12.4 MILL/UL (ref 4.4–6.2)
RBC # BLD AUTO: 3.75 MILL/UL (ref 4.4–6.2)
SODIUM SERPL-SCNC: 136 MMOL/L (ref 136–145)
SODIUM SERPL-SCNC: 136 MMOL/L (ref 136–145)
WBC # BLD AUTO: 10 THOU/UL (ref 4–11)
WBC: 10 THOU/UL (ref 4–11)

## 2018-08-06 PROCEDURE — 99309 SBSQ NF CARE MODERATE MDM 30: CPT | Performed by: NURSE PRACTITIONER

## 2018-08-06 NOTE — PROGRESS NOTES
"[facility]    Chief Complaint   Patient presents with     FREDERIC     [facility] Medical Record Number:  [id number]    HPI:    Bijan Daniel is a 83 year old  (8/12/1934), who is being seen today for an episodic care visit at [facility]. 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 Reports left leg was sore for the first 10 days of TCU admission, but is now improving. Still \"bad' at walking. Has an Ortho appointment on Wednesday. Is satisfied with Acetaminophen for pain control. Does not want to take anything stronger. Is not interested in topical creams.    Leukocytosis. WBC 10.0 on 8/6/18. Previous WBC 13.1 on 7/24/18.    Chronic Atrial Fibrillation on Anticoagulation. Upon review of documentation over the past week, heart rate primarily in the 70s with readings 50-87.    ALLERGIES: Nuts  Past Medical, Surgical, Family and Social History reviewed and updated in [facility].    Current Outpatient Prescriptions   Medication Sig Dispense Refill     ACETAMINOPHEN PO Take 1,000 mg by mouth 3 times daily        amoxicillin (AMOXIL) 250 MG capsule Take 250 mg by mouth daily        Apixaban (ELIQUIS PO) Take 5 mg by mouth 2 times daily       Calcium (OYSTER-BARBARA 500 PO) Take 2 tablets by mouth 2 times daily       DIPHENHYDRAMINE HCL PO Take 25 mg by mouth 2 times daily as needed       FUROSEMIDE PO Take 20 mg by mouth daily       ipratropium (ATROVENT) 0.06 % spray Spray 1-2 sprays into both nostrils 4 times daily as needed for rhinitis       metoprolol (LOPRESSOR) 100 MG tablet TAKE 1 TABLET ORALLY TWICE DAILY       omeprazole (PRILOSEC) 20 MG CR capsule Take 40 mg by mouth daily        senna-docusate (SENOKOT-S;PERICOLACE) 8.6-50 MG per tablet Take " 4 tablets by mouth daily as needed        THIAMINE HCL PO Take 100 mg by mouth daily       venlafaxine (EFFEXOR-XR) 37.5 MG 24 hr capsule Take 1 capsule (37.5 mg) by mouth daily 90 capsule 3     Medications reviewed:  Medications reconciled to facility chart and changes were made to reflect current medications as identified as above med list. Below are the changes that were made:   Medications stopped since last EPIC medication reconciliation:   There are no discontinued medications.    Medications started since last [facility] medication reconciliation:  No orders of the defined types were placed in this encounter.      REVIEW OF SYSTEMS:  4 point ROS including Respiratory, CV, GI and , other than that noted in the HPI,  is negative    Physical Exam:  /89  Pulse 76  Wt 203 lb (92.1 kg)  SpO2 96%  BMI 26.06 kg/m2  GENERAL APPEARANCE:  Alert, in no distress  ENT:  Mouth and posterior oropharynx normal, moist mucous membranes  EYES:  EOM, conjunctivae, lids, pupils and irises normal  RESP:  respiratory effort and palpation of chest normal, lungs clear to auscultation , no respiratory distress  CV:  Palpation and auscultation of heart done, irregular rhythm regular rate  ABDOMEN:  normal bowel sounds, soft, nontender, no hepatosplenomegaly or other masses  M/S:   Active movement of bilateral upper and lower extremities. Hip abduction brace. Compression stockings on BLE.  SKIN:  Black, hard linear scab in appearance extending through umbilicus. No drainage.  NEURO:   Cranial nerves 2-12 are normal tested and grossly at patient's baseline  PSYCH:  affect and mood normal     Recent Labs:   Last Complete Blood Count:  Lab Results   Component Value Date    WBC 10.0 08/06/2018     Lab Results   Component Value Date    RBC 12.4 08/06/2018     Lab Results   Component Value Date    HGB 12.4 08/06/2018     Lab Results   Component Value Date    HCT 37.7 08/06/2018     Lab Results   Component Value Date     08/06/2018      Lab Results   Component Value Date    MCH 33.1 08/06/2018     Lab Results   Component Value Date    MCHC 32.9 08/06/2018     Lab Results   Component Value Date    RDW 15.5 08/06/2018     Lab Results   Component Value Date     08/06/2018     Last Comprehensive Metabolic Panel:  Sodium   Date Value Ref Range Status   08/06/2018 136 136 - 145 mmol/L Final     Potassium   Date Value Ref Range Status   08/06/2018 4.0 3.5 - 5.0 mmol/L Final     Chloride   Date Value Ref Range Status   08/06/2018 106 98 - 107 mmol/L Final     Carbon Dioxide   Date Value Ref Range Status   08/06/2018 21 (A) 22 - 31 mmol/L Final     Anion Gap   Date Value Ref Range Status   08/06/2018 9 5 - 18 mmol/L Final     Glucose   Date Value Ref Range Status   08/06/2018 64 (A) 70 - 125 mg/dL Final     Urea Nitrogen   Date Value Ref Range Status   08/06/2018 12 8 - 28 mg/dL Final     Creatinine   Date Value Ref Range Status   08/06/2018 0.64 (A) 0.70 - 1.30 mg/dL Final     GFR Estimate   Date Value Ref Range Status   08/06/2018 >60 >60 mL/min/1.73m2 Final     Calcium   Date Value Ref Range Status   08/06/2018 8.1 (A) 8.5 - 10.5 mg/dL Final     Assessment/Plan:  Left Hip Dislocation S/P Reduction on 7/6/18 with 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 Also had left hip dislocation on 8/2/15 requiring reduction. Ortho consulted and recommended hip abduction brace when up in chair. Full weight bearing with brace. Has knee immobilizer. Acetaminophen scheduled for pain. Patient is satisfied with pain control. Does not want anything stronger than Acetaminophen. Follow-up with Ortho on 8/8/18 as scheduled. Physical and Occupational Therapy ordered for deconditioning.     Leukocytosis. Resolved. Secondary to E. Coli UTI.     Chronic Atrial Fibrillation on Anticoagulation. Heart rate primarily in the 70s. Continue Metoprolol and Apixaban as ordered.    Electronically signed by  Lyric Sheridan  HOLLI Cummins CNP

## 2018-08-06 NOTE — LETTER
"    8/6/2018        RE: Bijan Daniel  [address] [place] [address] [address]  [address]  [facility] [address]        [facility]    Chief Complaint   Patient presents with     FREDERIC     [facility] Medical Record Number:  [id number]    HPI:    Bijan Daniel is a 83 year old  (8/12/1934), who is being seen today for an episodic care visit at [facility] at [facility]. 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 Reports left leg was sore for the first 10 days of TCU admission, but is now improving. Still \"bad' at walking. Has an Ortho appointment on Wednesday. Is satisfied with Acetaminophen for pain control. Does not want to take anything stronger. Is not interested in topical creams.    Leukocytosis. WBC 10.0 on 8/6/18. Previous WBC 13.1 on 7/24/18.    Chronic Atrial Fibrillation on Anticoagulation. Upon review of documentation over the past week, heart rate primarily in the 70s with readings 50-87.    ALLERGIES: Nuts  Past Medical, Surgical, Family and Social History reviewed and updated in [facility].    Current Outpatient Prescriptions   Medication Sig Dispense Refill     ACETAMINOPHEN PO Take 1,000 mg by mouth 3 times daily        amoxicillin (AMOXIL) 250 MG capsule Take 250 mg by mouth daily        Apixaban (ELIQUIS PO) Take 5 mg by mouth 2 times daily       Calcium (OYSTER-BARBARA 500 PO) Take 2 tablets by mouth 2 times daily       DIPHENHYDRAMINE HCL PO Take 25 mg by mouth 2 times daily as needed       FUROSEMIDE PO Take 20 mg by mouth daily       ipratropium (ATROVENT) 0.06 % spray Spray 1-2 sprays into both nostrils 4 times daily as needed for rhinitis       metoprolol (LOPRESSOR) 100 MG tablet TAKE 1 TABLET ORALLY TWICE DAILY       omeprazole (PRILOSEC) 20 MG " CR capsule Take 40 mg by mouth daily        senna-docusate (SENOKOT-S;PERICOLACE) 8.6-50 MG per tablet Take 4 tablets by mouth daily as needed        THIAMINE HCL PO Take 100 mg by mouth daily       venlafaxine (EFFEXOR-XR) 37.5 MG 24 hr capsule Take 1 capsule (37.5 mg) by mouth daily 90 capsule 3     Medications reviewed:  Medications reconciled to facility chart and changes were made to reflect current medications as identified as above med list. Below are the changes that were made:   Medications stopped since last EPIC medication reconciliation:   There are no discontinued medications.    Medications started since last [facility] medication reconciliation:  No orders of the defined types were placed in this encounter.      REVIEW OF SYSTEMS:  4 point ROS including Respiratory, CV, GI and , other than that noted in the HPI,  is negative    Physical Exam:  /89  Pulse 76  Wt 203 lb (92.1 kg)  SpO2 96%  BMI 26.06 kg/m2  GENERAL APPEARANCE:  Alert, in no distress  ENT:  Mouth and posterior oropharynx normal, moist mucous membranes  EYES:  EOM, conjunctivae, lids, pupils and irises normal  RESP:  respiratory effort and palpation of chest normal, lungs clear to auscultation , no respiratory distress  CV:  Palpation and auscultation of heart done, irregular rhythm regular rate  ABDOMEN:  normal bowel sounds, soft, nontender, no hepatosplenomegaly or other masses  M/S:   Active movement of bilateral upper and lower extremities. Hip abduction brace. Compression stockings on BLE.  SKIN:  Black, hard linear scab in appearance extending through umbilicus. No drainage.  NEURO:   Cranial nerves 2-12 are normal tested and grossly at patient's baseline  PSYCH:  affect and mood normal     Recent Labs:   Last Complete Blood Count:  Lab Results   Component Value Date    WBC 10.0 08/06/2018     Lab Results   Component Value Date    RBC 12.4 08/06/2018     Lab Results   Component Value Date    HGB 12.4 08/06/2018     Lab  Results   Component Value Date    HCT 37.7 08/06/2018     Lab Results   Component Value Date     08/06/2018     Lab Results   Component Value Date    MCH 33.1 08/06/2018     Lab Results   Component Value Date    MCHC 32.9 08/06/2018     Lab Results   Component Value Date    RDW 15.5 08/06/2018     Lab Results   Component Value Date     08/06/2018     Last Comprehensive Metabolic Panel:  Sodium   Date Value Ref Range Status   08/06/2018 136 136 - 145 mmol/L Final     Potassium   Date Value Ref Range Status   08/06/2018 4.0 3.5 - 5.0 mmol/L Final     Chloride   Date Value Ref Range Status   08/06/2018 106 98 - 107 mmol/L Final     Carbon Dioxide   Date Value Ref Range Status   08/06/2018 21 (A) 22 - 31 mmol/L Final     Anion Gap   Date Value Ref Range Status   08/06/2018 9 5 - 18 mmol/L Final     Glucose   Date Value Ref Range Status   08/06/2018 64 (A) 70 - 125 mg/dL Final     Urea Nitrogen   Date Value Ref Range Status   08/06/2018 12 8 - 28 mg/dL Final     Creatinine   Date Value Ref Range Status   08/06/2018 0.64 (A) 0.70 - 1.30 mg/dL Final     GFR Estimate   Date Value Ref Range Status   08/06/2018 >60 >60 mL/min/1.73m2 Final     Calcium   Date Value Ref Range Status   08/06/2018 8.1 (A) 8.5 - 10.5 mg/dL Final     Assessment/Plan:  Left Hip Dislocation S/P Reduction on 7/6/18 with 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 Also had left hip dislocation on 8/2/15 requiring reduction. Ortho consulted and recommended hip abduction brace when up in chair. Full weight bearing with brace. Has knee immobilizer. Acetaminophen scheduled for pain. Patient is satisfied with pain control. Does not want anything stronger than Acetaminophen. Follow-up with Ortho on 8/8/18 as scheduled. Physical and Occupational Therapy ordered for deconditioning.     Leukocytosis. Resolved. Secondary to E. Coli UTI.     Chronic Atrial Fibrillation on Anticoagulation. Heart rate  primarily in the 70s. Continue Metoprolol and Apixaban as ordered.    Electronically signed by  HOLLI Alexandra CNP                      Sincerely,        HOLLI Alexandra CNP

## 2018-08-08 ENCOUNTER — TRANSFERRED RECORDS (OUTPATIENT)
Dept: HEALTH INFORMATION MANAGEMENT | Facility: CLINIC | Age: 83
End: 2018-08-08

## 2018-08-09 ENCOUNTER — NURSING HOME VISIT (OUTPATIENT)
Dept: GERIATRICS | Facility: CLINIC | Age: 83
End: 2018-08-09
Payer: MEDICARE

## 2018-08-09 VITALS
DIASTOLIC BLOOD PRESSURE: 72 MMHG | WEIGHT: 203.7 LBS | BODY MASS INDEX: 26.14 KG/M2 | TEMPERATURE: 94.6 F | SYSTOLIC BLOOD PRESSURE: 116 MMHG | RESPIRATION RATE: 19 BRPM | HEIGHT: 74 IN | OXYGEN SATURATION: 100 % | HEART RATE: 75 BPM

## 2018-08-09 DIAGNOSIS — Z96.649 DISLOCATION OF HIP JOINT PROSTHESIS, SUBSEQUENT ENCOUNTER: ICD-10-CM

## 2018-08-09 DIAGNOSIS — T84.029D DISLOCATION OF HIP JOINT PROSTHESIS, SUBSEQUENT ENCOUNTER: ICD-10-CM

## 2018-08-09 DIAGNOSIS — R41.89 COGNITIVE IMPAIRMENT: ICD-10-CM

## 2018-08-09 DIAGNOSIS — R53.81 PHYSICAL DECONDITIONING: Primary | ICD-10-CM

## 2018-08-09 PROCEDURE — 99309 SBSQ NF CARE MODERATE MDM 30: CPT | Performed by: NURSE PRACTITIONER

## 2018-08-09 RX ORDER — HYDROCORTISONE 10 MG/ML
LOTION TOPICAL 2 TIMES DAILY PRN
COMMUNITY
End: 2018-08-13

## 2018-08-09 NOTE — LETTER
8/9/2018        RE: Bijan Daniel  [address] [place] [address] [address]  [id number]  [facility] [id number]        [place] [facility]    Chief Complaint   Patient presents with     RECHECK     [place] Medical Record Number:  [id number]    HPI:    Bijan Daniel is a 83 year old  (8/12/1934), who is being seen today for an episodic care visit at [facility]. HPI information obtained from: facility chart records, facility staff, patient report and [facility] chart review. Today's concern is:    Physical Deconditioning. Working with Physical and Occupational Therapy. Ambulating up to 60 feet with FWW and CGA. Having increased pain with ambulation. Tinetti Score 12/28. Minimal assistance with sit-to-stand transfers. CGA for all other transfers. Minimal assistance with dressing. Able to stand 8 minutes. Modified independence with grooming and hygiene. Bed rail recommended for discharge.    Prosthetic Left Hip Dislocation. Per patient report, seen by Ortho on 8/8/18. Instructed to wear brace when sitting. Ok to remove brace when ambulating. Has not removed brace while ambulating yet. Has some pain, but does not want to use anything besides Tylenol.     Cognitive Impairment. BIMS Score 15/15. Occupational Therapy conducted cognitive testing. CPT completed with score of 4/7/5.6 which indicates mild-to-moderate functional decline.    ALLERGIES: Nuts  Past Medical, Surgical, Family and Social History reviewed and updated in [facility].    Current Outpatient Prescriptions   Medication Sig Dispense Refill     ACETAMINOPHEN PO Take 1,000 mg by mouth 3 times daily        amoxicillin (AMOXIL) 250 MG capsule Take 250 mg by mouth daily        Apixaban (ELIQUIS PO) Take 5 mg by mouth 2 times daily       Calcium (OYSTER-BARBARA 500 PO) Take 2 tablets by mouth 2 times daily       DIPHENHYDRAMINE HCL PO Take 25 mg by mouth 2 times daily as needed       hydrocortisone 1 % lotion Apply topically 2 times daily as  "needed       ipratropium (ATROVENT) 0.06 % spray Spray 1-2 sprays into both nostrils 4 times daily as needed for rhinitis       metoprolol (LOPRESSOR) 100 MG tablet TAKE 1 TABLET ORALLY TWICE DAILY       omeprazole (PRILOSEC) 20 MG CR capsule Take 40 mg by mouth daily        senna-docusate (SENOKOT-S;PERICOLACE) 8.6-50 MG per tablet Take 4 tablets by mouth daily as needed        THIAMINE HCL PO Take 100 mg by mouth daily       venlafaxine (EFFEXOR-XR) 37.5 MG 24 hr capsule Take 1 capsule (37.5 mg) by mouth daily 90 capsule 3     Medications reviewed:  Medications reconciled to facility chart and changes were made to reflect current medications as identified as above med list. Below are the changes that were made:   Medications stopped since last EPIC medication reconciliation:   There are no discontinued medications.    Medications started since last [facility] medication reconciliation:  No orders of the defined types were placed in this encounter.    REVIEW OF SYSTEMS:  4 point ROS including Respiratory, CV, GI and , other than that noted in the HPI,  is negative    Physical Exam:  /72  Pulse 75  Temp 94.6  F (34.8  C)  Resp 19  Ht 6' 2\" (1.88 m)  Wt 203 lb 11.2 oz (92.4 kg)  SpO2 100%  BMI 26.15 kg/m2  GENERAL APPEARANCE:  Alert, in no distress  ENT:  Mouth and posterior oropharynx normal, moist mucous membranes  EYES:  EOM, conjunctivae, lids, pupils and irises normal  RESP:  respiratory effort and palpation of chest normal, lungs clear to auscultation , no respiratory distress  CV:  Palpation and auscultation of heart done, irregular rhythm regular rate  ABDOMEN:  normal bowel sounds, soft, nontender, no hepatosplenomegaly or other masses  M/S:   Active movement of bilateral upper and lower extremities. Hip abduction brace. Compression stockings on BLE.  SKIN:  Black, hard linear scab in appearance extending through umbilicus. No drainage.  NEURO:   Cranial nerves 2-12 are normal tested and grossly at " patient's baseline  PSYCH:  affect and mood normal    Recent Labs:   Last Comprehensive Metabolic Panel:  Sodium   Date Value Ref Range Status   08/06/2018 136 136 - 145 mmol/L Final     Potassium   Date Value Ref Range Status   08/06/2018 4.0 3.5 - 5.0 mmol/L Final     Chloride   Date Value Ref Range Status   08/06/2018 106 98 - 107 mmol/L Final     Carbon Dioxide   Date Value Ref Range Status   08/06/2018 21 (A) 22 - 31 mmol/L Final     Anion Gap   Date Value Ref Range Status   08/06/2018 9 5 - 18 mmol/L Final     Glucose   Date Value Ref Range Status   08/06/2018 64 (A) 70 - 125 mg/dL Final     Urea Nitrogen   Date Value Ref Range Status   08/06/2018 12 8 - 28 mg/dL Final     Creatinine   Date Value Ref Range Status   08/06/2018 0.64 (A) 0.70 - 1.30 mg/dL Final     GFR Estimate   Date Value Ref Range Status   08/06/2018 >60 >60 mL/min/1.73m2 Final     Calcium   Date Value Ref Range Status   08/06/2018 8.1 (A) 8.5 - 10.5 mg/dL Final     Lab Results   Component Value Date    WBC 10.0 08/06/2018     Lab Results   Component Value Date    RBC 12.4 08/06/2018     Lab Results   Component Value Date    HGB 12.4 08/06/2018     Lab Results   Component Value Date    HCT 37.7 08/06/2018     Lab Results   Component Value Date     08/06/2018     Lab Results   Component Value Date    MCH 33.1 08/06/2018     Lab Results   Component Value Date    MCHC 32.9 08/06/2018     Lab Results   Component Value Date    RDW 15.5 08/06/2018     Lab Results   Component Value Date     08/06/2018     Assessment/Plan:  Physical Deconditioning. Secondary to recent hospitalization and co-morbidities. Continue Physical and Occupational Therapy. Anticipate discharge within 1-2 weeks.    Prosthetic Left Hip Dislocation S/P Reduction on 7/6/18 with 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 Also had left hip dislocation on 8/2/15 requiring reduction. Last seen by Ortho 8/8/18 with  instructions to wear hip abduction brace when sitting. Abduction pillow while lying down. Ok to remove brace when ambulating. Follow-up with Ortho as per them. Acetaminophen scheduled for pain. Patient is satisfied with pain control. Does not want anything stronger than Acetaminophen. Physical and Occupational Therapy ordered for deconditioning.     Cognitive Impairment. CPT score indicates mild-to-moderate cognitive impairment. Transitions Coordinator to assist patient and wife with appropriate discharge disposition and services.    Electronically signed by  HOLLI Alexandra CNP        Sincerely,        HOLLI Alexandra CNP

## 2018-08-09 NOTE — PROGRESS NOTES
[place] [facility]    Chief Complaint   Patient presents with     MATEOSHIMON     [place] Medical Record Number:  [id number]    HPI:    Bijan Daniel is a 83 year old  (8/12/1934), who is being seen today for an episodic care visit at [facility]. HPI information obtained from: facility chart records, facility staff, patient report and [facility] chart review. Today's concern is:    Physical Deconditioning. Working with Physical and Occupational Therapy. Ambulating up to 60 feet with FWW and CGA. Having increased pain with ambulation. Tinetti Score 12/28. Minimal assistance with sit-to-stand transfers. CGA for all other transfers. Minimal assistance with dressing. Able to stand 8 minutes. Modified independence with grooming and hygiene. Bed rail recommended for discharge.    Prosthetic Left Hip Dislocation. Per patient report, seen by Ortho on 8/8/18. Instructed to wear brace when sitting. Ok to remove brace when ambulating. Has not removed brace while ambulating yet. Has some pain, but does not want to use anything besides Tylenol.     Cognitive Impairment. BIMS Score 15/15. Occupational Therapy conducted cognitive testing. CPT completed with score of 4/7/5.6 which indicates mild-to-moderate functional decline.    ALLERGIES: Nuts  Past Medical, Surgical, Family and Social History reviewed and updated in [facility].    Current Outpatient Prescriptions   Medication Sig Dispense Refill     ACETAMINOPHEN PO Take 1,000 mg by mouth 3 times daily        amoxicillin (AMOXIL) 250 MG capsule Take 250 mg by mouth daily        Apixaban (ELIQUIS PO) Take 5 mg by mouth 2 times daily       Calcium (OYSTER-BARBARA 500 PO) Take 2 tablets by mouth 2 times daily       DIPHENHYDRAMINE HCL PO Take 25 mg by mouth 2 times daily as needed       hydrocortisone 1 % lotion Apply topically 2 times daily as needed       ipratropium (ATROVENT) 0.06 % spray Spray 1-2 sprays into both nostrils 4 times daily as needed  "for rhinitis       metoprolol (LOPRESSOR) 100 MG tablet TAKE 1 TABLET ORALLY TWICE DAILY       omeprazole (PRILOSEC) 20 MG CR capsule Take 40 mg by mouth daily        senna-docusate (SENOKOT-S;PERICOLACE) 8.6-50 MG per tablet Take 4 tablets by mouth daily as needed        THIAMINE HCL PO Take 100 mg by mouth daily       venlafaxine (EFFEXOR-XR) 37.5 MG 24 hr capsule Take 1 capsule (37.5 mg) by mouth daily 90 capsule 3     Medications reviewed:  Medications reconciled to facility chart and changes were made to reflect current medications as identified as above med list. Below are the changes that were made:   Medications stopped since last EPIC medication reconciliation:   There are no discontinued medications.    Medications started since last [facility] medication reconciliation:  No orders of the defined types were placed in this encounter.    REVIEW OF SYSTEMS:  4 point ROS including Respiratory, CV, GI and , other than that noted in the HPI,  is negative    Physical Exam:  /72  Pulse 75  Temp 94.6  F (34.8  C)  Resp 19  Ht 6' 2\" (1.88 m)  Wt 203 lb 11.2 oz (92.4 kg)  SpO2 100%  BMI 26.15 kg/m2  GENERAL APPEARANCE:  Alert, in no distress  ENT:  Mouth and posterior oropharynx normal, moist mucous membranes  EYES:  EOM, conjunctivae, lids, pupils and irises normal  RESP:  respiratory effort and palpation of chest normal, lungs clear to auscultation , no respiratory distress  CV:  Palpation and auscultation of heart done, irregular rhythm regular rate  ABDOMEN:  normal bowel sounds, soft, nontender, no hepatosplenomegaly or other masses  M/S:   Active movement of bilateral upper and lower extremities. Hip abduction brace. Compression stockings on BLE.  SKIN:  Black, hard linear scab in appearance extending through umbilicus. No drainage.  NEURO:   Cranial nerves 2-12 are normal tested and grossly at patient's baseline  PSYCH:  affect and mood normal    Recent Labs:   Last Comprehensive Metabolic " Panel:  Sodium   Date Value Ref Range Status   08/06/2018 136 136 - 145 mmol/L Final     Potassium   Date Value Ref Range Status   08/06/2018 4.0 3.5 - 5.0 mmol/L Final     Chloride   Date Value Ref Range Status   08/06/2018 106 98 - 107 mmol/L Final     Carbon Dioxide   Date Value Ref Range Status   08/06/2018 21 (A) 22 - 31 mmol/L Final     Anion Gap   Date Value Ref Range Status   08/06/2018 9 5 - 18 mmol/L Final     Glucose   Date Value Ref Range Status   08/06/2018 64 (A) 70 - 125 mg/dL Final     Urea Nitrogen   Date Value Ref Range Status   08/06/2018 12 8 - 28 mg/dL Final     Creatinine   Date Value Ref Range Status   08/06/2018 0.64 (A) 0.70 - 1.30 mg/dL Final     GFR Estimate   Date Value Ref Range Status   08/06/2018 >60 >60 mL/min/1.73m2 Final     Calcium   Date Value Ref Range Status   08/06/2018 8.1 (A) 8.5 - 10.5 mg/dL Final     Lab Results   Component Value Date    WBC 10.0 08/06/2018     Lab Results   Component Value Date    RBC 12.4 08/06/2018     Lab Results   Component Value Date    HGB 12.4 08/06/2018     Lab Results   Component Value Date    HCT 37.7 08/06/2018     Lab Results   Component Value Date     08/06/2018     Lab Results   Component Value Date    MCH 33.1 08/06/2018     Lab Results   Component Value Date    MCHC 32.9 08/06/2018     Lab Results   Component Value Date    RDW 15.5 08/06/2018     Lab Results   Component Value Date     08/06/2018     Assessment/Plan:  Physical Deconditioning. Secondary to recent hospitalization and co-morbidities. Continue Physical and Occupational Therapy. Anticipate discharge within 1-2 weeks.    Prosthetic Left Hip Dislocation S/P Reduction on 7/6/18 with 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 Also had left hip dislocation on 8/2/15 requiring reduction. Last seen by Ortho 8/8/18 with instructions to wear hip abduction brace when sitting. Abduction pillow while lying down. Ok to remove  brace when ambulating. Follow-up with Ortho as per them. Acetaminophen scheduled for pain. Patient is satisfied with pain control. Does not want anything stronger than Acetaminophen. Physical and Occupational Therapy ordered for deconditioning.     Cognitive Impairment. CPT score indicates mild-to-moderate cognitive impairment. Transitions Coordinator to assist patient and wife with appropriate discharge disposition and services.    Electronically signed by  HOLLI Alexandra CNP

## 2018-08-13 ENCOUNTER — NURSING HOME VISIT (OUTPATIENT)
Dept: GERIATRICS | Facility: CLINIC | Age: 83
End: 2018-08-13
Payer: MEDICARE

## 2018-08-13 VITALS
DIASTOLIC BLOOD PRESSURE: 72 MMHG | SYSTOLIC BLOOD PRESSURE: 132 MMHG | RESPIRATION RATE: 16 BRPM | HEART RATE: 74 BPM | TEMPERATURE: 97.4 F | BODY MASS INDEX: 26.23 KG/M2 | HEIGHT: 74 IN | WEIGHT: 204.4 LBS | OXYGEN SATURATION: 97 %

## 2018-08-13 DIAGNOSIS — N17.9 ACUTE KIDNEY INJURY (H): Primary | ICD-10-CM

## 2018-08-13 DIAGNOSIS — K59.00 CONSTIPATION, UNSPECIFIED CONSTIPATION TYPE: ICD-10-CM

## 2018-08-13 DIAGNOSIS — I48.20 CHRONIC ATRIAL FIBRILLATION (H): ICD-10-CM

## 2018-08-13 PROCEDURE — 99309 SBSQ NF CARE MODERATE MDM 30: CPT | Performed by: NURSE PRACTITIONER

## 2018-08-13 NOTE — PROGRESS NOTES
[place] [facility]    Chief Complaint   Patient presents with     MATEOSHIMON       [place] Medical Record Number:  [id number]    HPI:    Bijan Daniel is a 84 year old  (8/12/1934), who is being seen today for an episodic care visit at [facility].  HPI information obtained from: facility chart records, facility staff, patient report and [facility] chart review. Today's concern is:    Acute Kidney Injury. Secondary to ATN from septic shock during hospitalization. Last Creatinine 0.64 on 8/6/18. Previous Creatinine 0.69 on 7/23/18.     Chronic Atrial Fibrillation on Anticoagulation. No reports of palpitations. Upon review of heart rate over the past 5 days, range from 65-86.    Constipation. Denies abdominal pain. Upon review of documentation over the past 5 days, has had 3 bowel movements over the past 5 days. Does not want stool softeners scheduled. Likes to take as needed.    ALLERGIES: Nuts  Past Medical, Surgical, Family and Social History reviewed and updated in [facility].    Current Outpatient Prescriptions   Medication Sig Dispense Refill     ACETAMINOPHEN PO Take 1,000 mg by mouth 3 times daily        amoxicillin (AMOXIL) 250 MG capsule Take 250 mg by mouth daily        Apixaban (ELIQUIS PO) Take 5 mg by mouth 2 times daily       Calcium (OYSTER-BARBARA 500 PO) Take 2 tablets by mouth 2 times daily       DIPHENHYDRAMINE HCL PO Take 25 mg by mouth 2 times daily as needed       ipratropium (ATROVENT) 0.06 % spray Spray 1-2 sprays into both nostrils 4 times daily as needed for rhinitis       metoprolol (LOPRESSOR) 100 MG tablet TAKE 1 TABLET ORALLY TWICE DAILY       omeprazole (PRILOSEC) 20 MG CR capsule Take 40 mg by mouth daily        senna-docusate (SENOKOT-S;PERICOLACE) 8.6-50 MG per tablet Take 4 tablets by mouth daily as needed        THIAMINE HCL PO Take 100 mg by mouth daily       venlafaxine (EFFEXOR-XR) 37.5 MG 24 hr capsule Take 1 capsule (37.5 mg) by mouth daily 90  "capsule 3     Medications reviewed:  Medications reconciled to facility chart and changes were made to reflect current medications as identified as above med list. Below are the changes that were made:   Medications stopped since last EPIC medication reconciliation:   Medications Discontinued During This Encounter   Medication Reason     hydrocortisone 1 % lotion Medication Reconciliation Clean Up     Medications started since last [facility] medication reconciliation:  No orders of the defined types were placed in this encounter.    REVIEW OF SYSTEMS:  4 point ROS including Respiratory, CV, GI and , other than that noted in the HPI,  is negative    Physical Exam:  /72  Pulse 74  Temp 97.4  F (36.3  C)  Resp 16  Ht 6' 2\" (1.88 m)  Wt 204 lb 6.4 oz (92.7 kg)  SpO2 97%  BMI 26.24 kg/m2  GENERAL APPEARANCE:  Alert, in no distress  ENT:  Mouth and posterior oropharynx normal, moist mucous membranes  EYES:  EOM, conjunctivae, lids, pupils and irises normal  RESP:  respiratory effort and palpation of chest normal, lungs clear to auscultation , no respiratory distress  CV:  Palpation and auscultation of heart done, irregular rhythm regular rate  ABDOMEN: Active bowel sounds, soft, nontender, no hepatosplenomegaly or other masses  M/S:   Active movement of bilateral upper and lower extremities. Trace edema.  SKIN:  Inspection of skin and subcutaneous tissue baseline, Palpation of skin and subcutaneoube scars tissue baseline. Healing abdominal wound open at bottom. Red with protruding scar tissue. No drainage noted.   NEURO:   Cranial nerves 2-12 are normal tested and grossly at patient's baseline  PSYCH:  affect and mood normal    Recent Labs:   Last Comprehensive Metabolic Panel:  Sodium   Date Value Ref Range Status   08/06/2018 136 136 - 145 mmol/L Final     Potassium   Date Value Ref Range Status   08/06/2018 4.0 3.5 - 5.0 mmol/L Final     Chloride   Date Value Ref Range Status   08/06/2018 106 98 - 107 mmol/L " Final     Carbon Dioxide   Date Value Ref Range Status   08/06/2018 21 (A) 22 - 31 mmol/L Final     Anion Gap   Date Value Ref Range Status   08/06/2018 9 5 - 18 mmol/L Final     Glucose   Date Value Ref Range Status   08/06/2018 64 (A) 70 - 125 mg/dL Final     Urea Nitrogen   Date Value Ref Range Status   08/06/2018 12 8 - 28 mg/dL Final     Creatinine   Date Value Ref Range Status   08/06/2018 0.64 (A) 0.70 - 1.30 mg/dL Final     GFR Estimate   Date Value Ref Range Status   08/06/2018 >60 >60 mL/min/1.73m2 Final     Calcium   Date Value Ref Range Status   08/06/2018 8.1 (A) 8.5 - 10.5 mg/dL Final     Lab Results   Component Value Date    WBC 10.0 08/06/2018     Lab Results   Component Value Date    RBC 12.4 08/06/2018     Lab Results   Component Value Date    HGB 12.4 08/06/2018     Lab Results   Component Value Date    HCT 37.7 08/06/2018     Lab Results   Component Value Date     08/06/2018     Lab Results   Component Value Date    MCH 33.1 08/06/2018     Lab Results   Component Value Date    MCHC 32.9 08/06/2018     Lab Results   Component Value Date    RDW 15.5 08/06/2018     Lab Results   Component Value Date     08/06/2018     Assessment/Plan:  Acute Kidney Injury. Resolved. Secondary to ATN from septic shock during hospitalization. Monitor renal function periodically.     Chronic Atrial Fibrillation on Anticoagulation. Heart rate stable. Continue Metoprolol and Apixaban as ordered.    Constipation. Patient does not want Senna-S scheduled. Continue as ordered PRN.    Electronically signed by  HOLLI Alexandra CNP

## 2018-08-13 NOTE — LETTER
8/13/2018        RE: Bijan Daniel  [address] [place] [address] Ne  [phone]  Levine, Susan. \Hospital Has a New Name and Outlook.\"" [id number]        [facility]    Chief Complaint   Patient presents with     RECHECK       [facility] Medical Record Number:  [id number]    HPI:    Bijan Daniel is a 84 year old  (8/12/1934), who is being seen today for an episodic care visit at [facility] at [facility].  HPI information obtained from: facility chart records, facility staff, patient report and [facility] chart review. Today's concern is:    Acute Kidney Injury. Secondary to ATN from septic shock during hospitalization. Last Creatinine 0.64 on 8/6/18. Previous Creatinine 0.69 on 7/23/18.     Chronic Atrial Fibrillation on Anticoagulation. No reports of palpitations. Upon review of heart rate over the past 5 days, range from 65-86.    Constipation. Denies abdominal pain. Upon review of documentation over the past 5 days, has had 3 bowel movements over the past 5 days. Does not want stool softeners scheduled. Likes to take as needed.    ALLERGIES: Nuts  Past Medical, Surgical, Family and Social History reviewed and updated in [facility].    Current Outpatient Prescriptions   Medication Sig Dispense Refill     ACETAMINOPHEN PO Take 1,000 mg by mouth 3 times daily        amoxicillin (AMOXIL) 250 MG capsule Take 250 mg by mouth daily        Apixaban (ELIQUIS PO) Take 5 mg by mouth 2 times daily       Calcium (OYSTER-BARBARA 500 PO) Take 2 tablets by mouth 2 times daily       DIPHENHYDRAMINE HCL PO Take 25 mg by mouth 2 times daily as needed       ipratropium (ATROVENT) 0.06 % spray Spray 1-2 sprays into both nostrils 4 times daily as needed for rhinitis       metoprolol (LOPRESSOR) 100 MG tablet TAKE 1 TABLET ORALLY TWICE DAILY       omeprazole (PRILOSEC) 20 MG CR capsule Take 40 mg by mouth daily        senna-docusate (SENOKOT-S;PERICOLACE) 8.6-50 MG per tablet Take 4 tablets by mouth daily as needed        THIAMINE HCL PO Take 100 mg by  "mouth daily       venlafaxine (EFFEXOR-XR) 37.5 MG 24 hr capsule Take 1 capsule (37.5 mg) by mouth daily 90 capsule 3     Medications reviewed:  Medications reconciled to facility chart and changes were made to reflect current medications as identified as above med list. Below are the changes that were made:   Medications stopped since last EPIC medication reconciliation:   Medications Discontinued During This Encounter   Medication Reason     hydrocortisone 1 % lotion Medication Reconciliation Clean Up     Medications started since last [facility] medication reconciliation:  No orders of the defined types were placed in this encounter.    REVIEW OF SYSTEMS:  4 point ROS including Respiratory, CV, GI and , other than that noted in the HPI,  is negative    Physical Exam:  /72  Pulse 74  Temp 97.4  F (36.3  C)  Resp 16  Ht 6' 2\" (1.88 m)  Wt 204 lb 6.4 oz (92.7 kg)  SpO2 97%  BMI 26.24 kg/m2  GENERAL APPEARANCE:  Alert, in no distress  ENT:  Mouth and posterior oropharynx normal, moist mucous membranes  EYES:  EOM, conjunctivae, lids, pupils and irises normal  RESP:  respiratory effort and palpation of chest normal, lungs clear to auscultation , no respiratory distress  CV:  Palpation and auscultation of heart done, irregular rhythm regular rate  ABDOMEN: Active bowel sounds, soft, nontender, no hepatosplenomegaly or other masses, ABD dressing in place  M/S:   Active movement of bilateral upper and lower extremities. Trace edema.  SKIN:  Inspection of skin and subcutaneous tissue baseline, Palpation of skin and subcutaneoube scars tissue baseline. Healing abdominal wound open at bottom. Red with protruding scar tissue. No drainage noted.   NEURO:   Cranial nerves 2-12 are normal tested and grossly at patient's baseline  PSYCH:  affect and mood normal    Recent Labs:   Last Comprehensive Metabolic Panel:  Sodium   Date Value Ref Range Status   08/06/2018 136 136 - 145 mmol/L Final     Potassium   Date Value " Ref Range Status   08/06/2018 4.0 3.5 - 5.0 mmol/L Final     Chloride   Date Value Ref Range Status   08/06/2018 106 98 - 107 mmol/L Final     Carbon Dioxide   Date Value Ref Range Status   08/06/2018 21 (A) 22 - 31 mmol/L Final     Anion Gap   Date Value Ref Range Status   08/06/2018 9 5 - 18 mmol/L Final     Glucose   Date Value Ref Range Status   08/06/2018 64 (A) 70 - 125 mg/dL Final     Urea Nitrogen   Date Value Ref Range Status   08/06/2018 12 8 - 28 mg/dL Final     Creatinine   Date Value Ref Range Status   08/06/2018 0.64 (A) 0.70 - 1.30 mg/dL Final     GFR Estimate   Date Value Ref Range Status   08/06/2018 >60 >60 mL/min/1.73m2 Final     Calcium   Date Value Ref Range Status   08/06/2018 8.1 (A) 8.5 - 10.5 mg/dL Final     Lab Results   Component Value Date    WBC 10.0 08/06/2018     Lab Results   Component Value Date    RBC 12.4 08/06/2018     Lab Results   Component Value Date    HGB 12.4 08/06/2018     Lab Results   Component Value Date    HCT 37.7 08/06/2018     Lab Results   Component Value Date     08/06/2018     Lab Results   Component Value Date    MCH 33.1 08/06/2018     Lab Results   Component Value Date    MCHC 32.9 08/06/2018     Lab Results   Component Value Date    RDW 15.5 08/06/2018     Lab Results   Component Value Date     08/06/2018     Assessment/Plan:  Acute Kidney Injury. Resolved. Secondary to ATN from septic shock during hospitalization. Monitor renal function periodically.     Chronic Atrial Fibrillation on Anticoagulation. Heart rate stable. Continue Metoprolol and Apixaban as ordered.    Constipation. Patient does not want Senna-S scheduled. Continue as ordered PRN.    Electronically signed by  Lyric Cummins, HOLLI CNP          Sincerely,        HOLLI Alexandra CNP

## 2018-08-16 ENCOUNTER — NURSING HOME VISIT (OUTPATIENT)
Dept: GERIATRICS | Facility: CLINIC | Age: 83
End: 2018-08-16
Payer: MEDICARE

## 2018-08-16 VITALS
TEMPERATURE: 96.3 F | SYSTOLIC BLOOD PRESSURE: 108 MMHG | RESPIRATION RATE: 17 BRPM | OXYGEN SATURATION: 98 % | HEIGHT: 74 IN | WEIGHT: 204.6 LBS | DIASTOLIC BLOOD PRESSURE: 73 MMHG | HEART RATE: 72 BPM | BODY MASS INDEX: 26.26 KG/M2

## 2018-08-16 DIAGNOSIS — R65.21 SEPTIC SHOCK (H): Primary | ICD-10-CM

## 2018-08-16 DIAGNOSIS — J31.0 CHRONIC RHINITIS, UNSPECIFIED TYPE: ICD-10-CM

## 2018-08-16 DIAGNOSIS — F32.A DEPRESSION, UNSPECIFIED DEPRESSION TYPE: ICD-10-CM

## 2018-08-16 DIAGNOSIS — I48.20 CHRONIC ATRIAL FIBRILLATION (H): ICD-10-CM

## 2018-08-16 DIAGNOSIS — K59.00 CONSTIPATION, UNSPECIFIED CONSTIPATION TYPE: ICD-10-CM

## 2018-08-16 DIAGNOSIS — C91.10 CLL (CHRONIC LYMPHOCYTIC LEUKEMIA) (H): ICD-10-CM

## 2018-08-16 DIAGNOSIS — G47.33 OSA (OBSTRUCTIVE SLEEP APNEA): ICD-10-CM

## 2018-08-16 DIAGNOSIS — A41.9 SEPTIC SHOCK (H): Primary | ICD-10-CM

## 2018-08-16 DIAGNOSIS — T84.021D DISLOCATION OF INTERNAL LEFT HIP PROSTHESIS, SUBSEQUENT ENCOUNTER: ICD-10-CM

## 2018-08-16 DIAGNOSIS — J44.9 CHRONIC OBSTRUCTIVE PULMONARY DISEASE, UNSPECIFIED COPD TYPE (H): ICD-10-CM

## 2018-08-16 DIAGNOSIS — R41.89 COGNITIVE IMPAIRMENT: ICD-10-CM

## 2018-08-16 DIAGNOSIS — K21.9 GASTROESOPHAGEAL REFLUX DISEASE, ESOPHAGITIS PRESENCE NOT SPECIFIED: ICD-10-CM

## 2018-08-16 DIAGNOSIS — M86.60 CHRONIC OSTEOMYELITIS (H): ICD-10-CM

## 2018-08-16 PROCEDURE — 99316 NF DSCHRG MGMT 30 MIN+: CPT | Performed by: NURSE PRACTITIONER

## 2018-08-16 NOTE — LETTER
8/16/2018        RE: Bijan Daniel  [address] [place] [address] Ne  [address]  [facility] [id number]        [facility] DISCHARGE SUMMARY    PATIENT'S NAME: Bijan Daniel  YOB: 1934  MEDICAL RECORD NUMBER:  [id number]    PRIMARY CARE PROVIDER AND CLINIC RESPONSIBLE AFTER TRANSFER: Oc Randall [address] [facility] [place] / [facility] [place] [address]     CODE STATUS/ADVANCE DIRECTIVES DISCUSSION:   CPR/Full code     Allergies   Allergen Reactions     Nuts Anaphylaxis     TRANSFERRING PROVIDERS: HOLLI Alexandra CNP, Bailey Salinas MD  DATE OF SNF ADMISSION:  July / 15 / 2018  DATE OF SNF (anticipated) DISCHARGE: August / 21 / 2018  DISCHARGE DISPOSITION: [facility] and [facility]   Nursing Facility: [facility]  stay 7/7/18 to 7/15/18.     Condition on Discharge:  Improving.  Function:  Ambulating 100 feet with CGA-to-SBA and FWW. Moderate assistance x 1 for sit-to-stand transfers. Requires cues for hand placement with walker. Minimal assistance with dressing. Minimal assistance with dressing. Modified independence with sitting. SBA using side rail with bed mobility. Maximum assistance for DARRON socks  Cognitive Scores: CPT 4.7/5.6    Equipment: walker    DISCHARGE DIAGNOSIS:   1. Septic shock (H)    2. Dislocation of internal left hip prosthesis, subsequent encounter    3. Cognitive impairment    4. Chronic atrial fibrillation (H)    5. MARI (obstructive sleep apnea)    6. CLL (chronic lymphocytic leukemia) (H)    7. Chronic obstructive pulmonary disease, unspecified COPD type (H)    8. Chronic osteomyelitis (H)    9. Gastroesophageal reflux disease, esophagitis presence not specified    10. Depression, unspecified depression type    11. Constipation, unspecified constipation type    12. Chronic rhinitis, unspecified type      HPI Nursing Facility Course:  HPI information obtained from: facility chart records, facility  "staff, patient report and [facility] chart review.    This is an 83-year-old male, with a past medical history significant for bilateral hip replacements, left CHRIS dislocation with closed reduction 8/2/15, coronary artery disease, atrial fibrillation on anticoagulation, CLL, GERD, COPD, MARI on CPAP, chronic osteomyelitis involving the right humeral head on suppressive antibiotic therapy s/p total shoulder arthroplasty and depression, who was initially admitted to [facility] for left hip pain. A left hip x-ray revealed \"dislocation of left hip arthroplasty\" which was reduced in the ED. Admitted for observation and developed hypotension with periumbilical pain. Labs revealed Lactate 4.3, Hemoglobin 13, down from 15.2 day prior, and Creatinine 1.6. Urine output was noted to be low. A fluid bolus was administered as well as antibiotics. An abdominal CT revealed findings concerning for acute mesenteric ischemia. Transferred to [facility] for ICU admission.      While at [facility], a diagnostic laparoscopy converted to an open exploration was performed on 7/8/18 and revealed no acute findings. Questioned if intermittent bowel ischemia was related to extensive omental adhesions from previous open appendectomy. Treated for septic shock requiring intubation secondary to an E. Coli UTI. Infectious Disease was consulted and antibiotics were prescribed. Developed ATN secondary to septic shock and volume overload with fluid resuscitation. EF 55-60% on echocardiogram 7/8/18. IV diuresis was performed. Also developed thrombocytopenia secondary to septic shock. A TCU stay was recommended for ongoing physical rehabilitation.     Septic Shock Secondary to E. Coli Urinary Tract Infection. Treated with IV antibiotics during hospitalization until 7/15/18. Home Physical and Occupational Therapy ordered.     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 A diagnostic laparoscopy " converted to an open exploration was performed on 7/8/18 and revealed no acute findings. Questioned if intermittent bowel ischemia was related to extensive omental adhesions from previous open appendectomy. Follow-up with Dr. Hastings at [facility] as needed.     Hypervolemia. Secondary to septic shock and fluid resuscitation. EF 55-60% on 7/8/18. Admission weight ~ 205 lbs ->  220.66 lbs on 7/11/18 during hospitalization requiring IV diuresis. TCU weight ~ 205 lbs.     Acute Kidney Injury. Resolved. Secondary to ATN from septic shock. Resolved prior to hospital discharge. Peaked at 1.65 on 7/7/18. Last Creatinine 0.64 on 8/6/18.      Thrombocytopenia. Resolved. Likely secondary to septic shock. As low as 45 on 7/10/18. Last Platelet Count 159 on 8/6/18.      Left Hip Prosthesis Dislocation S/P Reduction on 7/6/18 with 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 Also had left hip dislocation on 8/2/15 requiring reduction. Last seen by Ortho 8/8/18 with instructions to wear hip abduction brace when sitting. Abduction pillow while lying down. Ok to remove brace when ambulating. Follow-up with Ortho as per them. Acetaminophen scheduled for pain. Patient is satisfied with pain control. Does not want anything stronger than Acetaminophen. Home Physical and Occupational Therapy ordered for deconditioning.     Cognitive Impairment. CPT Score 4.7/5.6 indicates mild cognitive impairment. Will return home to [facility] with wife.      Chronic Atrial Fibrillation on Anticoagulation. Heart rate primarily 60-80s. Continue Metoprolol and Apixaban as ordered.     Obstructive Sleep Apnea. Continue CPAP at home settings.     Chronic Lymphocytic Leukemia. Noted in history. No recent labs available to review. Oncology notes from 2012 indicate observation.      Chronic Obstructive Pulmonary Disease. Noted in history. On no medications. Monitor respiratory status closely due  "to wife's report of a cough.     Chronic Osteomyelitis of Right Shoulder S/P Right Total Shoulder Arthroplasty in 1993. Chronic issue with multiple surgeries. Continue Amoxicillin as ordered.     Gastroesophageal Reflux Disease.  Omeprazole increased from 20 mg to 40 mg on 7/23/18 due to complaints of indigestion with minimal improvement in symptoms.     Depression. Continue Venlafaxine as ordered.     Constipation. Continue Senna-S as ordered.     Chronic Rhinitis. Continue Ipratropium as ordered.    PAST MEDICAL HISTORY:  has no past medical history on file.    DISCHARGE MEDICATIONS:  Current Outpatient Prescriptions   Medication Sig Dispense Refill     ACETAMINOPHEN PO Take 1,000 mg by mouth 3 times daily        amoxicillin (AMOXIL) 250 MG capsule Take 250 mg by mouth daily        Apixaban (ELIQUIS PO) Take 5 mg by mouth 2 times daily       Calcium (OYSTER-BARBARA 500 PO) Take 2 tablets by mouth 2 times daily       DIPHENHYDRAMINE HCL PO Take 25 mg by mouth 2 times daily as needed       ipratropium (ATROVENT) 0.06 % spray Spray 1-2 sprays into both nostrils 4 times daily as needed for rhinitis       metoprolol (LOPRESSOR) 100 MG tablet TAKE 1 TABLET ORALLY TWICE DAILY       omeprazole (PRILOSEC) 20 MG CR capsule Take 40 mg by mouth daily        senna-docusate (SENOKOT-S;PERICOLACE) 8.6-50 MG per tablet Take 4 tablets by mouth daily as needed        THIAMINE HCL PO Take 100 mg by mouth daily       venlafaxine (EFFEXOR-XR) 37.5 MG 24 hr capsule Take 1 capsule (37.5 mg) by mouth daily 90 capsule 3     MEDICATION CHANGES/RATIONALE: See above  Controlled medications sent with patient:   not applicable/none     ROS:    4 point ROS including Respiratory, CV, GI and , other than that noted in the HPI,  is negative    Physical Exam:   Vitals: /73  Pulse 72  Temp 96.3  F (35.7  C)  Resp 17  Ht 6' 2\" (1.88 m)  Wt 204 lb 9.6 oz (92.8 kg)  SpO2 98%  BMI 26.27 kg/m2  BMI= Body mass index is 26.27 kg/(m^2).  GENERAL " APPEARANCE:  Alert, in no distress  ENT:  Mouth and posterior oropharynx normal, moist mucous membranes  EYES:  EOM, conjunctivae, lids, pupils and irises normal  RESP:  respiratory effort and palpation of chest normal, lungs clear to auscultation, no respiratory distress  CV:  Palpation and auscultation of heart done, irregular rhythm regular rate  ABDOMEN: Active bowel sounds, soft, nontender, no hepatosplenomegaly or other masses  M/S:   Active movement of bilateral upper and lower extremities. 1+ edema.  SKIN:  Inspection of skin and subcutaneous tissue baseline, Palpation of skin and subcutaneoube scars tissue baseline. Healing abdominal wound open at bottom. Red with protruding scar tissue. No drainage noted.   NEURO:   Cranial nerves 2-12 are normal tested and grossly at patient's baseline  PSYCH:  affect and mood normal     DISCHARGE PLAN:  Occupational Therapy, Physical Therapy, Registered Nurse and Home Health Aide  Patient instructed to follow-up with:  PCP in 7 days      Current [place] scheduled appointments:  Future Appointments  Date Time Provider Department Center   8/16/2018 10:00 AM Lyric Cummins APRN CNP [facility]     MT referral needed and placed by this provider: No    Pending labs: None    SNF labs   Last Comprehensive Metabolic Panel:  Sodium   Date Value Ref Range Status   08/06/2018 136 136 - 145 mmol/L Final     Potassium   Date Value Ref Range Status   08/06/2018 4.0 3.5 - 5.0 mmol/L Final     Chloride   Date Value Ref Range Status   08/06/2018 106 98 - 107 mmol/L Final     Carbon Dioxide   Date Value Ref Range Status   08/06/2018 21 (A) 22 - 31 mmol/L Final     Anion Gap   Date Value Ref Range Status   08/06/2018 9 5 - 18 mmol/L Final     Glucose   Date Value Ref Range Status   08/06/2018 64 (A) 70 - 125 mg/dL Final     Urea Nitrogen   Date Value Ref Range Status   08/06/2018 12 8 - 28 mg/dL Final     Creatinine   Date Value Ref Range Status   08/06/2018 0.64 (A)  0.70 - 1.30 mg/dL Final     GFR Estimate   Date Value Ref Range Status   08/06/2018 >60 >60 mL/min/1.73m2 Final     Calcium   Date Value Ref Range Status   08/06/2018 8.1 (A) 8.5 - 10.5 mg/dL Final     Lab Results   Component Value Date    WBC 10.0 08/06/2018     Lab Results   Component Value Date    RBC 12.4 08/06/2018     Lab Results   Component Value Date    HGB 12.4 08/06/2018     Lab Results   Component Value Date    HCT 37.7 08/06/2018     Lab Results   Component Value Date     08/06/2018     Lab Results   Component Value Date    MCH 33.1 08/06/2018     Lab Results   Component Value Date    MCHC 32.9 08/06/2018     Lab Results   Component Value Date    RDW 15.5 08/06/2018     Lab Results   Component Value Date     08/06/2018       Discharge Treatments: See facility discharge orders    TOTAL DISCHARGE TIME:   Greater than 30 minutes     Electronically signed by:  HOLLI Alexandra CNP        Documentation of Face-to-Face and Certification for Home Health Services     Patient: Bijan Daniel   YOB: 1934  MR Number: [id number]  Today's Date: 8/18/2018    I certify that patient: Bijan Daniel is under my care and that I, or a nurse practitioner or physician's assistant working with me, had a face-to-face encounter that meets the physician face-to-face encounter requirements with this patient on: 8/16/18.    This encounter with the patient was in whole, or in part, for the following medical condition, which is the primary reason for home health care: Left hip dislocation    I certify that, based on my findings, the following services are medically necessary home health services: Nursing, Occupational Therapy and Physical Therapy.    My clinical findings support the need for the above services because: Nurse is needed: To assess pain after changes in medications or other medical regimen.., Occupational Therapy Services are needed to assess and treat cognitive ability and  address ADL safety due to impairment in ADLs with recent hospitalizaton for septic shock and left hip dislocation. and Physical Therapy Services are needed to assess and treat the following functional impairments: Gait instability due to left hip dislocation.    Further, I certify that my clinical findings support that this patient is homebound (i.e. absences from home require considerable and taxing effort and are for medical reasons or [employer] or infrequently or of short duration when for other reasons) because: Requires assistance of another person or specialized equipment to access medical services because patient: Requires supervision of another for safe transfer...    Based on the above findings. I certify that this patient is confined to the home and needs intermittent skilled nursing care, physical therapy and/or speech therapy.  The patient is under my care, and I have initiated the establishment of the plan of care.  This patient will be followed by a physician who will periodically review the plan of care.  Physician/Provider to provide follow up care: Oc Randall    Responsible Medicare certified PECOS Physician: Dr. Bailey Salinas  Physician Signature: See electronic signature associated with these discharge orders.  Date: 8/18/2018          Sincerely,        HOLLI Alexandra CNP

## 2018-08-16 NOTE — PROGRESS NOTES
[facility] DISCHARGE SUMMARY    PATIENT'S NAME: Bijan Daniel  YOB: 1934  MEDICAL RECORD NUMBER:  [id number]    PRIMARY CARE PROVIDER AND CLINIC RESPONSIBLE AFTER TRANSFER: Oc Randall [address] [place] [address] / [facility] [address]     CODE STATUS/ADVANCE DIRECTIVES DISCUSSION:   CPR/Full code     Allergies   Allergen Reactions     Nuts Anaphylaxis     TRANSFERRING PROVIDERS: HOLLI Alexandra CNP, Bailey Salinas MD  DATE OF SNF ADMISSION:  July / 15 / 2018  DATE OF SNF (anticipated) DISCHARGE: August / 21 / 2018  DISCHARGE DISPOSITION: [facility] and [facility] Providers   Nursing Facility: [facility]  stay 7/7/18 to 7/15/18.     Condition on Discharge:  Improving.  Function:  Ambulating 100 feet with CGA-to-SBA and FWW. Moderate assistance x 1 for sit-to-stand transfers. Requires cues for hand placement with walker. Minimal assistance with dressing. Minimal assistance with dressing. Modified independence with sitting. SBA using side rail with bed mobility. Maximum assistance for DARRON socks  Cognitive Scores: CPT 4.7/5.6    Equipment: walker    DISCHARGE DIAGNOSIS:   1. Septic shock (H)    2. Dislocation of internal left hip prosthesis, subsequent encounter    3. Cognitive impairment    4. Chronic atrial fibrillation (H)    5. MARI (obstructive sleep apnea)    6. CLL (chronic lymphocytic leukemia) (H)    7. Chronic obstructive pulmonary disease, unspecified COPD type (H)    8. Chronic osteomyelitis (H)    9. Gastroesophageal reflux disease, esophagitis presence not specified    10. Depression, unspecified depression type    11. Constipation, unspecified constipation type    12. Chronic rhinitis, unspecified type      HPI Nursing Facility Course:  HPI information obtained from: facility chart records, facility staff, patient report and [facility] chart review.    This is an 83-year-old male, with a past medical  "history significant for bilateral hip replacements, left CHRIS dislocation with closed reduction 8/2/15, coronary artery disease, atrial fibrillation on anticoagulation, CLL, GERD, COPD, MARI on CPAP, chronic osteomyelitis involving the right humeral head on suppressive antibiotic therapy s/p total shoulder arthroplasty and depression, who was initially admitted to [facility] for left hip pain. A left hip x-ray revealed \"dislocation of left hip arthroplasty\" which was reduced in the ED. Admitted for observation and developed hypotension with periumbilical pain. Labs revealed Lactate 4.3, Hemoglobin 13, down from 15.2 day prior, and Creatinine 1.6. Urine output was noted to be low. A fluid bolus was administered as well as antibiotics. An abdominal CT revealed findings concerning for acute mesenteric ischemia. Transferred to [facility] for ICU admission.      While at [facility], a diagnostic laparoscopy converted to an open exploration was performed on 7/8/18 and revealed no acute findings. Questioned if intermittent bowel ischemia was related to extensive omental adhesions from previous open appendectomy. Treated for septic shock requiring intubation secondary to an E. Coli UTI. Infectious Disease was consulted and antibiotics were prescribed. Developed ATN secondary to septic shock and volume overload with fluid resuscitation. EF 55-60% on echocardiogram 7/8/18. IV diuresis was performed. Also developed thrombocytopenia secondary to septic shock. A TCU stay was recommended for ongoing physical rehabilitation.     Septic Shock Secondary to E. Coli Urinary Tract Infection. Treated with IV antibiotics during hospitalization until 7/15/18. Home Physical and Occupational Therapy ordered.     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 A diagnostic laparoscopy converted to an open exploration was performed on 7/8/18 and revealed no acute findings. Questioned if " intermittent bowel ischemia was related to extensive omental adhesions from previous open appendectomy. Follow-up with Dr. Hastings at [facility] as needed.     Hypervolemia. Secondary to septic shock and fluid resuscitation. EF 55-60% on 7/8/18. Admission weight ~ 205 lbs -> 220.66 lbs on 7/11/18 during hospitalization requiring IV diuresis. TCU weight ~ 205 lbs.     Acute Kidney Injury. Resolved. Secondary to ATN from septic shock. Resolved prior to hospital discharge. Peaked at 1.65 on 7/7/18. Last Creatinine 0.64 on 8/6/18.      Thrombocytopenia. Resolved. Likely secondary to septic shock. As low as 45 on 7/10/18. Last Platelet Count 159 on 8/6/18.      Left Hip Prosthesis Dislocation S/P Reduction on 7/6/18 with 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 Also had left hip dislocation on 8/2/15 requiring reduction. Last seen by Ortho 8/8/18 with instructions to wear hip abduction brace when sitting. Abduction pillow while lying down. Ok to remove brace when ambulating. Follow-up with Ortho as per them. Acetaminophen scheduled for pain. Patient is satisfied with pain control. Does not want anything stronger than Acetaminophen. Home Physical and Occupational Therapy ordered for deconditioning.     Cognitive Impairment. CPT Score 4.7/5.6 indicates mild cognitive impairment. Will return home to [facility] with wife.      Chronic Atrial Fibrillation on Anticoagulation. Heart rate primarily 60-80s. Continue Metoprolol and Apixaban as ordered.     Obstructive Sleep Apnea. Continue CPAP at home settings.     Chronic Lymphocytic Leukemia. Noted in history. No recent labs available to review. Oncology notes from 2012 indicate observation.      Chronic Obstructive Pulmonary Disease. Noted in history. On no medications. Monitor respiratory status closely due to wife's report of a cough.     Chronic Osteomyelitis of Right Shoulder S/P Right Total Shoulder  "Arthroplasty in 1993. Chronic issue with multiple surgeries. Continue Amoxicillin as ordered.     Gastroesophageal Reflux Disease. Omeprazole increased from 20 mg to 40 mg on 7/23/18 due to complaints of indigestion with minimal improvement in symptoms.     Depression. Continue Venlafaxine as ordered.     Constipation. Continue Senna-S as ordered.     Chronic Rhinitis. Continue Ipratropium as ordered.    PAST MEDICAL HISTORY:  has no past medical history on file.    DISCHARGE MEDICATIONS:  Current Outpatient Prescriptions   Medication Sig Dispense Refill     ACETAMINOPHEN PO Take 1,000 mg by mouth 3 times daily        amoxicillin (AMOXIL) 250 MG capsule Take 250 mg by mouth daily        Apixaban (ELIQUIS PO) Take 5 mg by mouth 2 times daily       Calcium (OYSTER-BARBARA 500 PO) Take 2 tablets by mouth 2 times daily       DIPHENHYDRAMINE HCL PO Take 25 mg by mouth 2 times daily as needed       ipratropium (ATROVENT) 0.06 % spray Spray 1-2 sprays into both nostrils 4 times daily as needed for rhinitis       metoprolol (LOPRESSOR) 100 MG tablet TAKE 1 TABLET ORALLY TWICE DAILY       omeprazole (PRILOSEC) 20 MG CR capsule Take 40 mg by mouth daily        senna-docusate (SENOKOT-S;PERICOLACE) 8.6-50 MG per tablet Take 4 tablets by mouth daily as needed        THIAMINE HCL PO Take 100 mg by mouth daily       venlafaxine (EFFEXOR-XR) 37.5 MG 24 hr capsule Take 1 capsule (37.5 mg) by mouth daily 90 capsule 3     MEDICATION CHANGES/RATIONALE: See above  Controlled medications sent with patient:   not applicable/none     ROS:    4 point ROS including Respiratory, CV, GI and , other than that noted in the HPI,  is negative    Physical Exam:   Vitals: /73  Pulse 72  Temp 96.3  F (35.7  C)  Resp 17  Ht 6' 2\" (1.88 m)  Wt 204 lb 9.6 oz (92.8 kg)  SpO2 98%  BMI 26.27 kg/m2  BMI= Body mass index is 26.27 kg/(m^2).  GENERAL APPEARANCE:  Alert, in no distress  ENT:  Mouth and posterior oropharynx normal, moist mucous " membranes  EYES:  EOM, conjunctivae, lids, pupils and irises normal  RESP:  respiratory effort and palpation of chest normal, lungs clear to auscultation, no respiratory distress  CV:  Palpation and auscultation of heart done, irregular rhythm regular rate  ABDOMEN: Active bowel sounds, soft, nontender, no hepatosplenomegaly or other masses  M/S:   Active movement of bilateral upper and lower extremities. 1+ edema.  SKIN:  Inspection of skin and subcutaneous tissue baseline, Palpation of skin and subcutaneoube scars tissue baseline. Healing abdominal wound open at bottom. Red with protruding scar tissue. No drainage noted.   NEURO:   Cranial nerves 2-12 are normal tested and grossly at patient's baseline  PSYCH:  affect and mood normal     DISCHARGE PLAN:  Occupational Therapy, Physical Therapy, Registered Nurse and Home Health Aide  Patient instructed to follow-up with:  PCP in 7 days      Current [facility] scheduled appointments:  Future Appointments  Date Time Provider Department Center   8/16/2018 10:00 AM Lyric Cummins, APRN CNP [facility] referral needed and placed by this provider: No    Pending labs: None    SNF labs   Last Comprehensive Metabolic Panel:  Sodium   Date Value Ref Range Status   08/06/2018 136 136 - 145 mmol/L Final     Potassium   Date Value Ref Range Status   08/06/2018 4.0 3.5 - 5.0 mmol/L Final     Chloride   Date Value Ref Range Status   08/06/2018 106 98 - 107 mmol/L Final     Carbon Dioxide   Date Value Ref Range Status   08/06/2018 21 (A) 22 - 31 mmol/L Final     Anion Gap   Date Value Ref Range Status   08/06/2018 9 5 - 18 mmol/L Final     Glucose   Date Value Ref Range Status   08/06/2018 64 (A) 70 - 125 mg/dL Final     Urea Nitrogen   Date Value Ref Range Status   08/06/2018 12 8 - 28 mg/dL Final     Creatinine   Date Value Ref Range Status   08/06/2018 0.64 (A) 0.70 - 1.30 mg/dL Final     GFR Estimate   Date Value Ref Range Status   08/06/2018 >60 >60  mL/min/1.73m2 Final     Calcium   Date Value Ref Range Status   08/06/2018 8.1 (A) 8.5 - 10.5 mg/dL Final     Lab Results   Component Value Date    WBC 10.0 08/06/2018     Lab Results   Component Value Date    RBC 12.4 08/06/2018     Lab Results   Component Value Date    HGB 12.4 08/06/2018     Lab Results   Component Value Date    HCT 37.7 08/06/2018     Lab Results   Component Value Date     08/06/2018     Lab Results   Component Value Date    MCH 33.1 08/06/2018     Lab Results   Component Value Date    MCHC 32.9 08/06/2018     Lab Results   Component Value Date    RDW 15.5 08/06/2018     Lab Results   Component Value Date     08/06/2018       Discharge Treatments: See facility discharge orders    TOTAL DISCHARGE TIME:   Greater than 30 minutes     Electronically signed by:  HOLLI Alexandra CNP        Documentation of Face-to-Face and Certification for Home Health Services     Patient: Bijan Daniel   YOB: 1934  MR Number: [id number]  Today's Date: 8/18/2018    I certify that patient: Bijan Daniel is under my care and that I, or a nurse practitioner or physician's assistant working with me, had a face-to-face encounter that meets the physician face-to-face encounter requirements with this patient on: 8/16/18.    This encounter with the patient was in whole, or in part, for the following medical condition, which is the primary reason for home health care: Left hip dislocation    I certify that, based on my findings, the following services are medically necessary home health services: Nursing, Occupational Therapy and Physical Therapy.    My clinical findings support the need for the above services because: Nurse is needed: To assess pain after changes in medications or other medical regimen.., Occupational Therapy Services are needed to assess and treat cognitive ability and address ADL safety due to impairment in ADLs with recent hospitalizaton for septic shock and  left hip dislocation. and Physical Therapy Services are needed to assess and treat the following functional impairments: Gait instability due to left hip dislocation.    Further, I certify that my clinical findings support that this patient is homebound (i.e. absences from home require considerable and taxing effort and are for medical reasons or [employer] services or infrequently or of short duration when for other reasons) because: Requires assistance of another person or specialized equipment to access medical services because patient: Requires supervision of another for safe transfer...    Based on the above findings. I certify that this patient is confined to the home and needs intermittent skilled nursing care, physical therapy and/or speech therapy.  The patient is under my care, and I have initiated the establishment of the plan of care.  This patient will be followed by a physician who will periodically review the plan of care.  Physician/Provider to provide follow up care: Oc Randall    Responsible Medicare certified PEC Physician: Dr. Bailey Salinas  Physician Signature: See electronic signature associated with these discharge orders.  Date: 2018      Face to Face and Medical Necessity Statement for DME Provider visit    Demographic Information on Bijan Daniel:  Gender: male  : 1934  [address] [place] [address]  [id number]  [place], [place]. \[facility]"" [id number]  [phone] (home)     Medical Record: [id number]  Social Security Number: [id number]  Primary Care Provider: Oc Randall  Insurance: Payor: MEDICARE / Plan: MEDICARE / Product Type: Medicare /     HPI:   Bijan Daniel is a 84 year old  (1934), who is being seen today for a face to face provider visit at [facility]; medical necessity statement for DME included. This patient requires the following:  DME Ordered and Medical Necessity Statement   Mechanical Wheelchair  Size: Standard 20 x 18  Corresponding  cushion: Yes: standard  Standard foot rests: Elevating foot rest on left and right  Elevating leg rests: No  Arm rests: Yes: Full length left and right  Lap tray: No  Dose patient use oxygen? No   Able to propel w/c? Yes   Mobility related ADL that are affected in the home:  Dressing, bed mobility, transfers  The wheelchair is suitable and necessary for use in the patient's home.  Reason why a cane or walker will not meet the patient's needs. (ie: balance, tolerance, level of assistance) Patient is unable to ambulate long distances with walker due to dislocation of the left hip prosthesis  The patient has expressed willingness to use the wheelchair in the home and does have the physical and cognitive ability to maneuver the equipment or has a caregiver who is available, willing, and able to provide assistance in the home with the wheelchair.   Patients functional mobility deficit can be sufficiently resolved by the use of the above wheelchair    Pt needing above DME with expected length of need of 99  months  due to medical necessity associated with following diagnosis:     Septic shock (H)  Dislocation of internal left hip prosthesis, subsequent encounter  Cognitive impairment  Chronic atrial fibrillation (H)  MARI (obstructive sleep apnea)  CLL (chronic lymphocytic leukemia) (H)  Chronic obstructive pulmonary disease, unspecified COPD type (H)  Chronic osteomyelitis (H)  Gastroesophageal reflux disease, esophagitis presence not specified  Depression, unspecified depression type  Constipation, unspecified constipation type  Chronic rhinitis, unspecified type      PMH   has no past medical history on file.  CURRENT MEDICATIONS  Current Outpatient Prescriptions   Medication Sig Dispense Refill     ACETAMINOPHEN PO Take 1,000 mg by mouth 3 times daily        amoxicillin (AMOXIL) 250 MG capsule Take 250 mg by mouth daily        Apixaban (ELIQUIS PO) Take 5 mg by mouth 2 times daily       Calcium (OYSTER-BARBRAA 500 PO) Take  "2 tablets by mouth 2 times daily       DIPHENHYDRAMINE HCL PO Take 25 mg by mouth 2 times daily as needed       ipratropium (ATROVENT) 0.06 % spray Spray 1-2 sprays into both nostrils 4 times daily as needed for rhinitis       metoprolol (LOPRESSOR) 100 MG tablet TAKE 1 TABLET ORALLY TWICE DAILY       omeprazole (PRILOSEC) 20 MG CR capsule Take 40 mg by mouth daily        senna-docusate (SENOKOT-S;PERICOLACE) 8.6-50 MG per tablet Take 4 tablets by mouth daily as needed        THIAMINE HCL PO Take 100 mg by mouth daily       venlafaxine (EFFEXOR-XR) 37.5 MG 24 hr capsule Take 1 capsule (37.5 mg) by mouth daily 90 capsule 3     ROS:4 point ROS including Respiratory, CV, GI and , other than that noted in the HPI,  is negative    EXAM  Vitals: /73  Pulse 72  Temp 96.3  F (35.7  C)  Resp 17  Ht 6' 2\" (1.88 m)  Wt 204 lb 9.6 oz (92.8 kg)  SpO2 98%  BMI 26.27 kg/m2;BMI= Body mass index is 26.27 kg/(m^2).   GENERAL APPEARANCE:  Alert, in no distress  ENT:  Mouth and posterior oropharynx normal, moist mucous membranes  EYES:  EOM, conjunctivae, lids, pupils and irises normal  RESP:  respiratory effort and palpation of chest normal, lungs clear to auscultation, no respiratory distress  CV:  Palpation and auscultation of heart done, irregular rhythm regular rate  ABDOMEN: Active bowel sounds, soft, nontender, no hepatosplenomegaly or other masses  M/S:   Active movement of bilateral upper and lower extremities. 1+ edema.  SKIN:  Inspection of skin and subcutaneous tissue baseline, Palpation of skin and subcutaneoube scars tissue baseline. Healing abdominal wound open at bottom. Red with protruding scar tissue. No drainage noted.   NEURO:   Cranial nerves 2-12 are normal tested and grossly at patient's baseline  PSYCH:  affect and mood normal    ASSESSMENT/PLAN:  1. Septic shock (H)    2. Dislocation of internal left hip prosthesis, subsequent encounter    3. Cognitive impairment    4. Chronic atrial fibrillation " (H)    5. MARI (obstructive sleep apnea)    6. CLL (chronic lymphocytic leukemia) (H)    7. Chronic obstructive pulmonary disease, unspecified COPD type (H)    8. Chronic osteomyelitis (H)    9. Gastroesophageal reflux disease, esophagitis presence not specified    10. Depression, unspecified depression type    11. Constipation, unspecified constipation type    12. Chronic rhinitis, unspecified type        Orders:  1. Facility staff/TC to contact [employer] company to get their order form for provider to fill out    ELECTRONICALLY SIGNED BY IBAN CERTIFIED PROVIDER:  HOLLI Alexandra CNP   NPI: [id number]  [facility]  [address] [place] [address], [address]  [place] [address]

## 2018-08-28 ENCOUNTER — TELEPHONE (OUTPATIENT)
Dept: FAMILY MEDICINE | Facility: CLINIC | Age: 83
End: 2018-08-28

## 2018-08-28 NOTE — TELEPHONE ENCOUNTER
Forms received from: [facility]   Phone number listed: [phone]   Fax listed: [phone]  Date received: 08/28/18  Form description: Face-to-Face Encounter for Home Care  Once forms are completed, please return to [facility] via Fax.  Is patient requesting to be contacted when forms are completed: NA    Form placed: In Provider's basket  Lina Arthur

## 2018-08-29 NOTE — TELEPHONE ENCOUNTER
Patient has appointment scheduled on 09/06/18 at 12pm, would you like me to hold the form until then?  TAE De Luna

## 2018-09-06 ENCOUNTER — OFFICE VISIT (OUTPATIENT)
Dept: FAMILY MEDICINE | Facility: CLINIC | Age: 83
End: 2018-09-06
Payer: MEDICARE

## 2018-09-06 VITALS
SYSTOLIC BLOOD PRESSURE: 127 MMHG | HEART RATE: 80 BPM | DIASTOLIC BLOOD PRESSURE: 95 MMHG | OXYGEN SATURATION: 100 % | TEMPERATURE: 95.9 F

## 2018-09-06 DIAGNOSIS — C91.10 CHRONIC LYMPHOCYTIC LEUKEMIA (H): ICD-10-CM

## 2018-09-06 DIAGNOSIS — S73.005D DISLOCATION OF LEFT HIP, SUBSEQUENT ENCOUNTER: ICD-10-CM

## 2018-09-06 DIAGNOSIS — K55.059 ACUTE MESENTERIC ISCHEMIA (H): ICD-10-CM

## 2018-09-06 DIAGNOSIS — N17.9 ACUTE KIDNEY INJURY (H): Primary | ICD-10-CM

## 2018-09-06 DIAGNOSIS — R30.0 DYSURIA: ICD-10-CM

## 2018-09-06 DIAGNOSIS — M19.012 PRIMARY OSTEOARTHRITIS OF LEFT SHOULDER: ICD-10-CM

## 2018-09-06 DIAGNOSIS — M86.9: ICD-10-CM

## 2018-09-06 LAB
ALBUMIN UR-MCNC: NEGATIVE MG/DL
ANION GAP SERPL CALCULATED.3IONS-SCNC: 11 MMOL/L (ref 3–14)
APPEARANCE UR: CLEAR
BILIRUB UR QL STRIP: NEGATIVE
BUN SERPL-MCNC: 14 MG/DL (ref 7–30)
CALCIUM SERPL-MCNC: 9.1 MG/DL (ref 8.5–10.1)
CHLORIDE SERPL-SCNC: 105 MMOL/L (ref 94–109)
CO2 SERPL-SCNC: 23 MMOL/L (ref 20–32)
COLOR UR AUTO: YELLOW
CREAT SERPL-MCNC: 0.9 MG/DL (ref 0.66–1.25)
DIFFERENTIAL METHOD BLD: ABNORMAL
ERYTHROCYTE [DISTWIDTH] IN BLOOD BY AUTOMATED COUNT: 13.5 % (ref 10–15)
GFR SERPL CREATININE-BSD FRML MDRD: 81 ML/MIN/1.7M2
GLUCOSE SERPL-MCNC: 87 MG/DL (ref 70–99)
GLUCOSE UR STRIP-MCNC: NEGATIVE MG/DL
HCT VFR BLD AUTO: 41.6 % (ref 40–53)
HGB BLD-MCNC: 13.9 G/DL (ref 13.3–17.7)
HGB UR QL STRIP: NEGATIVE
KETONES UR STRIP-MCNC: NEGATIVE MG/DL
LEUKOCYTE ESTERASE UR QL STRIP: NEGATIVE
LYMPHOCYTES # BLD AUTO: 7.8 10E9/L (ref 0.8–5.3)
LYMPHOCYTES NFR BLD AUTO: 62 %
MCH RBC QN AUTO: 33.3 PG (ref 26.5–33)
MCHC RBC AUTO-ENTMCNC: 33.4 G/DL (ref 31.5–36.5)
MCV RBC AUTO: 100 FL (ref 78–100)
MONOCYTES # BLD AUTO: 0.6 10E9/L (ref 0–1.3)
MONOCYTES NFR BLD AUTO: 5 %
NEUTROPHILS # BLD AUTO: 4.1 10E9/L (ref 1.6–8.3)
NEUTROPHILS NFR BLD AUTO: 33 %
NITRATE UR QL: NEGATIVE
NRBC # BLD AUTO: 0.4 10*3/UL
NRBC BLD AUTO-RTO: 3 /100
PH UR STRIP: 6 PH (ref 5–7)
PLATELET # BLD AUTO: 205 10E9/L (ref 150–450)
PLATELET # BLD EST: ABNORMAL 10*3/UL
POTASSIUM SERPL-SCNC: 4.5 MMOL/L (ref 3.4–5.3)
RBC # BLD AUTO: 4.17 10E12/L (ref 4.4–5.9)
RBC MORPH BLD: NORMAL
SMUDGE CELLS BLD QL SMEAR: PRESENT
SODIUM SERPL-SCNC: 139 MMOL/L (ref 133–144)
SOURCE: NORMAL
SP GR UR STRIP: 1.01 (ref 1–1.03)
UROBILINOGEN UR STRIP-ACNC: 1 EU/DL (ref 0.2–1)
WBC # BLD AUTO: 12.5 10E9/L (ref 4–11)

## 2018-09-06 PROCEDURE — 85025 COMPLETE CBC W/AUTO DIFF WBC: CPT | Performed by: INTERNAL MEDICINE

## 2018-09-06 PROCEDURE — 99214 OFFICE O/P EST MOD 30 MIN: CPT | Performed by: INTERNAL MEDICINE

## 2018-09-06 PROCEDURE — 81003 URINALYSIS AUTO W/O SCOPE: CPT | Performed by: INTERNAL MEDICINE

## 2018-09-06 PROCEDURE — 80048 BASIC METABOLIC PNL TOTAL CA: CPT | Performed by: INTERNAL MEDICINE

## 2018-09-06 PROCEDURE — 36415 COLL VENOUS BLD VENIPUNCTURE: CPT | Performed by: INTERNAL MEDICINE

## 2018-09-06 ASSESSMENT — PAIN SCALES - GENERAL: PAINLEVEL: NO PAIN (0)

## 2018-09-06 NOTE — MR AVS SNAPSHOT
After Visit Summary   9/6/2018    Bijan Daniel    MRN: [id number]           Patient Information     Date Of Birth          8/12/1934        Visit Information        Provider Department      9/6/2018 12:00 PM Oc Randall MD [facility]        Today's Diagnoses     Acute kidney injury (H)    -  1    Dysuria           Follow-ups after your visit        Follow-up notes from your care team     Return in about 4 weeks (around 10/4/2018).      Who to contact     If you have questions or need follow up information about today's clinic visit or your schedule please contact [facility] directly at [phone].  Normal or non-critical lab and imaging results will be communicated to you by MyChart, letter or phone within 4 business days after the clinic has received the results. If you do not hear from us within 7 days, please contact the clinic through [employer] or phone. If you have a critical or abnormal lab result, we will notify you by phone as soon as possible.  Submit refill requests through [employer] or call your pharmacy and they will forward the refill request to us. Please allow 3 business days for your refill to be completed.          Additional Information About Your Visit        MyChart Information     [employer] gives you secure access to your electronic health record. If you see a primary care provider, you can also send messages to your care team and make appointments. If you have questions, please call your primary care clinic.  If you do not have a primary care provider, please call [phone] and they will assist you.        Care EveryWhere ID     This is your Care EveryWhere ID. This could be used by other organizations to access your [facility] medical records  [id number]        Your Vitals Were     Pulse Temperature Pulse Oximetry             80 95.9  F (35.5  C) (Oral) 100%          Blood Pressure from Last 3 Encounters:   09/06/18 (!) 127/95    08/16/18 108/73   08/13/18 132/72    Weight from Last 3 Encounters:   08/16/18 204 lb 9.6 oz (92.8 kg)   08/13/18 204 lb 6.4 oz (92.7 kg)   08/09/18 203 lb 11.2 oz (92.4 kg)              We Performed the Following     *UA reflex to Microscopic and Culture ([place] and [facility] (except [place] and [place])     Basic metabolic panel     CBC with platelets and differential        Primary Care Provider Office Phone # Fax #    Oc Randall [phone]       [address] [facility] [address]        Equal Access to Services     BIANKA MCDONOUGH : Hadii nale grandao [employer]qu[employer], waaxda luqadaha, qaybta kaalmada kale, arlene samuel . So [facility] [phone].    ATENCIÓN: Si habla español, tiene a kwon disposición servicios gratuitos de asistencia lingüística. Llame al [phone].    We comply with applicable federal civil rights laws and [place] laws. We do not discriminate on the basis of race, color, national origin, age, disability, sex, sexual orientation, or gender identity.            Thank you!     Thank you for choosing [facility]  for your care. Our goal is always to provide you with excellent care. Hearing back from our patients is one way we can continue to improve our services. Please take a few minutes to complete the written survey that you may receive in the mail after your visit with us. Thank you!             Your Updated Medication List - Protect others around you: Learn how to safely use, store and throw away your medicines at www.disposemymeds.org.          This list is accurate as of 9/6/18 12:35 PM.  Always use your most recent med list.                   Brand Name Dispense Instructions for use Diagnosis    ACETAMINOPHEN PO      Take 1,000 mg by mouth 3 times daily        amoxicillin 250 MG capsule    AMOXIL     Take 250 mg by mouth daily        DIPHENHYDRAMINE HCL PO      Take 25 mg by mouth 2 times daily as  needed        ELIQUIS PO      Take 5 mg by mouth 2 times daily        ipratropium 0.06 % spray    ATROVENT     Spray 1-2 sprays into both nostrils 4 times daily as needed for rhinitis    Chronic vasomotor rhinitis       metoprolol tartrate 100 MG tablet    LOPRESSOR     TAKE 1 TABLET ORALLY TWICE DAILY        omeprazole 20 MG CR capsule    priLOSEC    30 capsule    Take 2 capsules (40 mg) by mouth daily    Acute kidney injury (H), Dysuria       OYSTER-BARBARA 500 PO      Take 2 tablets by mouth 2 times daily        senna-docusate 8.6-50 MG per tablet    SENOKOT-S;PERICOLACE     Take 4 tablets by mouth daily as needed        THIAMINE HCL PO      Take 100 mg by mouth daily        venlafaxine 37.5 MG 24 hr capsule    EFFEXOR-XR    90 capsule    Take 1 capsule (37.5 mg) by mouth daily    Mild major depression (H)

## 2018-09-06 NOTE — PROGRESS NOTES
SUBJECTIVE:                                                    Bijan Dainel is a 84 year old male who presents to clinic today for the following health issues:      Left Hip dislocation twice.  UTi , urosepsis, MADI bowel  Sent from [facility] to [facility]. 3 days, memory decreased  There was concern over ischemic bowel.  Surgery in the stomacg  Low pressure . On ventillator for awhile.      [place].  And now home.  8/22/2018  catherter removed. Taking amoxicillin right shoulder rest of the life  Omeprazole - increased from 20 -40   breathing good  Shortness of breath 100 ft.      Hospital Follow-up Visit:    Hospital/Nursing Home/IP Rehab Facility: [facility]  Date of Admission: 07/15/18  Date of Discharge: 08/22/18  Reason(s) for Admission: Left joint displacement            Problems taking medications regularly:  None       Medication changes since discharge: None       Problems adhering to non-medication therapy:  None    Summary of hospitalization:  CareEverywhere information obtained and reviewed  Diagnostic Tests/Treatments reviewed.  Follow up needed: none  Other Healthcare Providers Involved in Patient s Care:         None  Update since discharge: improved.     Post Discharge Medication Reconciliation: discharge medications reconciled and changed, per note/orders (see AVS).  Plan of care communicated with patient     Coding guidelines for this visit:  Type of Medical   Decision Making Face-to-Face Visit       within 7 Days of discharge Face-to-Face Visit        within 14 days of discharge   Moderate Complexity 44379 [id number]   High Complexity [id number]              Patient is concern about frequent nightly urination. He goes almost every hour.    Problem list and histories reviewed & adjusted, as indicated.  Additional history: as documented    Patient Active Problem List   Diagnosis     Chronic atrial fibrillation (H)     MARI (obstructive sleep apnea)     Vitamin B12 deficiency (non anemic)     Moderate major  depression (H)     Primary osteoarthritis of left shoulder     Acute mesenteric ischemia (H)     MADI (acute kidney injury) (H)     Chronic lymphocytic leukemia (H)     Osteomyelitis of arm (H)     Past Surgical History:   Procedure Laterality Date     APPENDECTOMY Right      AS TOTAL HIP ARTHROPLASTY Right 2008     AS TOTAL HIP ARTHROPLASTY Left 2009     C ANESTH,SHOULDER REPLACEMENT       removal shoulder arthroplasty         Social History   Substance Use Topics     Smoking status: Former Smoker     Smokeless tobacco: Never Used     Alcohol use Yes      Comment: 14 or less     History reviewed. No pertinent family history.      Current Outpatient Prescriptions   Medication Sig Dispense Refill     ACETAMINOPHEN PO Take 1,000 mg by mouth 3 times daily        amoxicillin (AMOXIL) 250 MG capsule Take 250 mg by mouth daily        Apixaban (ELIQUIS PO) Take 5 mg by mouth 2 times daily       Calcium (OYSTER-BARBARA 500 PO) Take 2 tablets by mouth 2 times daily       DIPHENHYDRAMINE HCL PO Take 25 mg by mouth 2 times daily as needed       ipratropium (ATROVENT) 0.06 % spray Spray 1-2 sprays into both nostrils 4 times daily as needed for rhinitis       metoprolol (LOPRESSOR) 100 MG tablet TAKE 1 TABLET ORALLY TWICE DAILY       omeprazole (PRILOSEC) 20 MG CR capsule Take 2 capsules (40 mg) by mouth daily 30 capsule      senna-docusate (SENOKOT-S;PERICOLACE) 8.6-50 MG per tablet Take 4 tablets by mouth daily as needed        THIAMINE HCL PO Take 100 mg by mouth daily       venlafaxine (EFFEXOR-XR) 37.5 MG 24 hr capsule Take 1 capsule (37.5 mg) by mouth daily 90 capsule 3     Allergies   Allergen Reactions     Nuts Anaphylaxis     Recent Labs   Lab Test  09/06/18   1250 08/06/18 06/27/18   1420   LDL   --    --    --   90   HDL   --    --    --   57   TRIG   --    --    --   95   CR  0.90  0.64*   < >  1.03   GFRESTIMATED  81  >60   < >  69   GFRESTBLACK  >90  >60   < >  83   POTASSIUM  4.5  4.0   < >  4.7    < > = values in  this interval not displayed.        ROS:  Constitutional, HEENT, cardiovascular, pulmonary, gi and gu systems are negative, except as otherwise noted.    OBJECTIVE:     BP (!) 127/95 (BP Location: Right arm, Patient Position: Chair, Cuff Size: Adult Regular)  Pulse 80  Temp 95.9  F (35.5  C) (Oral)  SpO2 100%  There is no height or weight on file to calculate BMI.  GENERAL: healthy, alert and no distress  EYES: Eyes grossly normal to inspection, PERRL and conjunctivae and sclerae normal  HENT: ear canals and TM's normal, nose and mouth without ulcers or lesions  NECK: no adenopathy, no asymmetry, masses, or scars and thyroid normal to palpation  RESP: lungs clear to auscultation - no rales, rhonchi or wheezes  CV: regular rate and rhythm, normal S1 S2, no S3 or S4, no murmur, click or rub, no peripheral edema and peripheral pulses strong  ABDOMEN: soft, nontender, no hepatosplenomegaly, no masses and bowel sounds normal  MS: right shoulder fixed in place  Left hip painful but stable range of motion noted.        SKIN: no suspicious lesions or rashes  NEURO: Normal strength and tone, mentation intact and speech normal  BACK: no CVA tenderness, no paralumbar tenderness    Diagnostic Test Results:  Results for orders placed or performed in visit on 09/06/18   Basic metabolic panel   Result Value Ref Range    Sodium 139 133 - 144 mmol/L    Potassium 4.5 3.4 - 5.3 mmol/L    Chloride 105 94 - 109 mmol/L    Carbon Dioxide 23 20 - 32 mmol/L    Anion Gap 11 3 - 14 mmol/L    Glucose 87 70 - 99 mg/dL    Urea Nitrogen 14 7 - 30 mg/dL    Creatinine 0.90 0.66 - 1.25 mg/dL    GFR Estimate 81 >60 mL/min/1.7m2    GFR Estimate If Black >90 >60 mL/min/1.7m2    Calcium 9.1 8.5 - 10.1 mg/dL   CBC with platelets and differential   Result Value Ref Range    WBC 12.5 (H) 4.0 - 11.0 10e9/L    RBC Count 4.17 (L) 4.4 - 5.9 10e12/L    Hemoglobin 13.9 13.3 - 17.7 g/dL    Hematocrit 41.6 40.0 - 53.0 %     78 - 100 fl    MCH 33.3 (H)  26.5 - 33.0 pg    MCHC 33.4 31.5 - 36.5 g/dL    RDW 13.5 10.0 - 15.0 %    Platelet Count 205 150 - 450 10e9/L    % Neutrophils 33.0 %    % Lymphocytes 62.0 %    % Monocytes 5.0 %    Nucleated RBCs 3 (H) 0 /100    Absolute Neutrophil 4.1 1.6 - 8.3 10e9/L    Absolute Lymphocytes 7.8 (H) 0.8 - 5.3 10e9/L    Absolute Monocytes 0.6 0.0 - 1.3 10e9/L    Absolute Nucleated RBC 0.4     Smudge Cells Present     RBC Morphology Normal     Platelet Estimate       Automated count confirmed.  Platelet morphology is normal.    Diff Method Manual Differential    *UA reflex to Microscopic and Culture ([facility] (except [place] and [place])   Result Value Ref Range    Color Urine Yellow     Appearance Urine Clear     Glucose Urine Negative NEG^Negative mg/dL    Bilirubin Urine Negative NEG^Negative    Ketones Urine Negative NEG^Negative mg/dL    Specific Gravity Urine 1.010 1.003 - 1.035    Blood Urine Negative NEG^Negative    pH Urine 6.0 5.0 - 7.0 pH    Protein Albumin Urine Negative NEG^Negative mg/dL    Urobilinogen Urine 1.0 0.2 - 1.0 EU/dL    Nitrite Urine Negative NEG^Negative    Leukocyte Esterase Urine Negative NEG^Negative    Source Midstream Urine        ASSESSMENT/PLAN:       ICD-10-CM    1. Acute kidney injury (H) N17.9 omeprazole (PRILOSEC) 20 MG CR capsule     Basic metabolic panel     CBC with platelets and differential     *UA reflex to Microscopic and Culture ([facility] (except [place] and [place])   2. Dysuria R30.0 omeprazole (PRILOSEC) 20 MG CR capsule     Basic metabolic panel     CBC with platelets and differential     *UA reflex to Microscopic and Culture ([facility] (except [place] and [place])   3. Dislocation of left hip, subsequent encounter S73.005D    4. Primary osteoarthritis of left shoulder M19.012    5. Acute mesenteric ischemia (H) K55.059    6. Chronic lymphocytic leukemia (H) C91.10    7. Osteomyelitis of arm (H) M86.9          This is a  face to face encounter home home care post discharge from [facility]  Need for EN for medication management, fall risk assessment, coordination of care  PT for weakness, poor ambulation with left hip dislocation and previous osteomyelitis with erosion right shoudler and severe arthritis left shoulder  Ot for fall risk, strength and fine motor  Cannot walk more than 100 ft with walker and partial assit at this time is homebound      Oc Randall MD  [facility]

## 2018-09-07 PROBLEM — M19.012 PRIMARY OSTEOARTHRITIS OF LEFT SHOULDER: Status: ACTIVE | Noted: 2018-09-07

## 2018-09-07 PROBLEM — N17.9 AKI (ACUTE KIDNEY INJURY) (H): Status: ACTIVE | Noted: 2018-07-07

## 2018-09-07 PROBLEM — K55.059 ACUTE MESENTERIC ISCHEMIA (H): Status: ACTIVE | Noted: 2018-07-07

## 2018-09-14 ENCOUNTER — TELEPHONE (OUTPATIENT)
Dept: FAMILY MEDICINE | Facility: CLINIC | Age: 83
End: 2018-09-14
Payer: MEDICARE

## 2018-09-14 ENCOUNTER — TELEPHONE (OUTPATIENT)
Dept: FAMILY MEDICINE | Facility: CLINIC | Age: 83
End: 2018-09-14

## 2018-09-14 DIAGNOSIS — C91.10 CHRONIC LYMPHOCYTIC LEUKEMIA (H): Primary | ICD-10-CM

## 2018-09-14 DIAGNOSIS — N17.9 AKI (ACUTE KIDNEY INJURY) (H): ICD-10-CM

## 2018-09-14 DIAGNOSIS — K55.059 ACUTE MESENTERIC ISCHEMIA (H): ICD-10-CM

## 2018-09-14 NOTE — TELEPHONE ENCOUNTER
Forms received from: [facility]   Phone number listed: [phone]   Fax listed: [phone]  Date received: 09/14/18  Form description: Order for OT Services  Once forms are completed, please return to [facility] via Fax.  Is patient requesting to be contacted when forms are completed: NA    Form placed: In Providers ben Arthur

## 2018-09-14 NOTE — TELEPHONE ENCOUNTER
Forms received from: [facility]   Phone number listed: [phone]   Fax listed: [phone]  Date received: 09/14/18  Form description: Home Health Cert-08/22/18-10/20/18)  Once forms are completed, please return to [facility] via Fax.  Is patient requesting to be contacted when forms are completed: NA    Form placed: in providers KAIN Arthur

## 2018-09-18 PROCEDURE — G0179 MD RECERTIFICATION HHA PT: HCPCS | Performed by: INTERNAL MEDICINE

## 2018-09-18 RX ORDER — AMOXICILLIN 250 MG
4 CAPSULE ORAL DAILY PRN
Qty: 100 TABLET | COMMUNITY
Start: 2018-09-18 | End: 2020-02-19

## 2018-09-18 NOTE — TELEPHONE ENCOUNTER
Medications reconciled on [facility] form, changes noted on form.  Form to PCP for signature.    Anna Cuadra RN  [facility]

## 2018-09-19 DIAGNOSIS — Z53.9 DIAGNOSIS NOT YET DEFINED: Primary | ICD-10-CM

## 2018-09-19 PROCEDURE — G0180 MD CERTIFICATION HHA PATIENT: HCPCS | Performed by: INTERNAL MEDICINE

## 2018-10-03 ENCOUNTER — TRANSFERRED RECORDS (OUTPATIENT)
Dept: HEALTH INFORMATION MANAGEMENT | Facility: CLINIC | Age: 83
End: 2018-10-03

## 2018-10-04 ENCOUNTER — TELEPHONE (OUTPATIENT)
Dept: FAMILY MEDICINE | Facility: CLINIC | Age: 83
End: 2018-10-04

## 2018-10-04 NOTE — TELEPHONE ENCOUNTER
Reason for Call:  Other FYI    Detailed comments: Nida from [facility] calling to inform that patient has completed his home care goals and is being discharged from home care.    Phone Number Patient can be reached at: Other phone number:  [phone]* ([facility])    Best Time: n/a    Can we leave a detailed message on this number? Not Applicable    Call taken on 10/4/2018 at 9:52 AM by Cedric Jiang

## 2018-10-04 NOTE — TELEPHONE ENCOUNTER
Routing to PCP to review and advise.    HC discharge.    Anna Cuadra RN  [facility]

## 2018-10-08 ENCOUNTER — TELEPHONE (OUTPATIENT)
Dept: FAMILY MEDICINE | Facility: CLINIC | Age: 83
End: 2018-10-08

## 2018-10-08 ENCOUNTER — MEDICAL CORRESPONDENCE (OUTPATIENT)
Dept: HEALTH INFORMATION MANAGEMENT | Facility: CLINIC | Age: 83
End: 2018-10-08

## 2018-10-08 DIAGNOSIS — N17.9 ACUTE KIDNEY INJURY (H): ICD-10-CM

## 2018-10-08 DIAGNOSIS — R30.0 DYSURIA: ICD-10-CM

## 2018-10-08 NOTE — TELEPHONE ENCOUNTER
Medications reconciled on [facility] form, changes noted on form.  Form to PCP for signature.    Anna Cuadra RN  [facility]

## 2018-10-08 NOTE — TELEPHONE ENCOUNTER
Forms received from: [facility]   Phone number listed: [phone]   Fax listed: [phone]  Date received: 10/08/18  Form description: Revision to Plan of Care (08/22/18-10/22/18)  Once forms are completed, please return to [facility] via Fax.  Is patient requesting to be contacted when forms are completed: NA    Form placed: in Providers KAIN Arthur

## 2018-10-09 ENCOUNTER — TELEPHONE (OUTPATIENT)
Dept: FAMILY MEDICINE | Facility: CLINIC | Age: 83
End: 2018-10-09

## 2018-10-09 NOTE — TELEPHONE ENCOUNTER
Forms received from: [facility]   Phone number listed: [phone]   Fax listed: [phone]  Date received: 10/09/18  Form description: Verbal Discharge Order  Once forms are completed, please return to [facility] via Fax.  Is patient requesting to be contacted when forms are completed: NA    Form placed: in providers ben Arthur

## 2018-11-16 NOTE — TELEPHONE ENCOUNTER
Forms received from: [facility]   Phone number listed: [phone]   Fax listed: [phone]  Date received: 7-6-17  Form description: CPap form  Once forms are completed, please return to [facility] via fax.  Is patient requesting to be contacted when forms are completed: n/a  Form placed: provider desk  Mary Todd  
Requested information faxed to number provided.  
No

## 2018-12-11 ENCOUNTER — TELEPHONE (OUTPATIENT)
Dept: FAMILY MEDICINE | Facility: CLINIC | Age: 83
End: 2018-12-11

## 2018-12-11 NOTE — TELEPHONE ENCOUNTER
Forms received from: [employer]   Phone number listed: [phone]   Fax listed: [phone]  Date received: 12/11/18  Form description: Verbal Orders  Once forms are completed, please return to [facility] via Fax.  Is patient requesting to be contacted when forms are completed: NA    Form placed: in providers ben Arthur

## 2018-12-12 NOTE — TELEPHONE ENCOUNTER
Requested information faxed to number provided.  Lina [facility]

## 2019-03-21 ENCOUNTER — OFFICE VISIT (OUTPATIENT)
Dept: OPHTHALMOLOGY | Facility: CLINIC | Age: 84
End: 2019-03-21
Payer: MEDICARE

## 2019-03-21 DIAGNOSIS — H53.2 DIPLOPIA: Primary | ICD-10-CM

## 2019-03-21 PROCEDURE — 86255 FLUORESCENT ANTIBODY SCREEN: CPT | Mod: 90 | Performed by: OPHTHALMOLOGY

## 2019-03-21 PROCEDURE — 99000 SPECIMEN HANDLING OFFICE-LAB: CPT | Performed by: OPHTHALMOLOGY

## 2019-03-21 PROCEDURE — 83519 RIA NONANTIBODY: CPT | Mod: 90 | Performed by: OPHTHALMOLOGY

## 2019-03-21 PROCEDURE — 92012 INTRM OPH EXAM EST PATIENT: CPT | Performed by: OPHTHALMOLOGY

## 2019-03-21 PROCEDURE — 83516 IMMUNOASSAY NONANTIBODY: CPT | Mod: 90 | Performed by: OPHTHALMOLOGY

## 2019-03-21 PROCEDURE — 36415 COLL VENOUS BLD VENIPUNCTURE: CPT | Performed by: OPHTHALMOLOGY

## 2019-03-21 ASSESSMENT — REFRACTION_WEARINGRX
OD_CYLINDER: +0.50
OD_AXIS: 096
SPECS_TYPE: PAL
OS_CYLINDER: +0.75
OS_ADD: +3.00
OS_SPHERE: -0.25
OD_ADD: +3.00
OS_AXIS: 032
OD_SPHERE: -0.75

## 2019-03-21 ASSESSMENT — VISUAL ACUITY
OS_CC: 20/50
OD_CC: 20/25
CORRECTION_TYPE: GLASSES
OS_PH_CC: 20/40
METHOD: SNELLEN - LINEAR

## 2019-03-21 ASSESSMENT — REFRACTION_FINALRX
OD_HPRISM: 3.0
OD_VPRISM: 1.0
OD_HBASE: OUT
OS_HBASE: OUT
OS_HPRISM: 3.0

## 2019-03-21 NOTE — PROGRESS NOTES
Current Eye Medications:  None.       Subjective:  Patient complains of primarily horizontal diplopia, but also vertical diplopia since August.  Diplopia is variable with head position.  Overall, sharpness of vision is adequate.  Glasses are from our Optical.      Objective:  See Ophthalmology Exam.       Assessment:  Increased esodeviation from previous exam.      Plan:  Glasses Rx given - increase prism as noted.  Will recheck MG panel - last tested almost 2 years ago.  Obtain labs - will call with results.   Return visit in 2 months for intraocular pressure check and dilation.     Adrian Suggs M.D.  [phone]

## 2019-03-21 NOTE — LETTER
3/21/2019         RE: Bijan Daniel  [address] [place] [address] [address]  [phone]  [facility] [address]        Dear Colleague,    Thank you for referring your patient, Bijan Daniel, to the [facility]. Please see a copy of my visit note below.     Current Eye Medications:  None.       Subjective:  Patient complains of primarily horizontal diplopia, but also vertical diplopia since August.  Diplopia is variable with head position.  Overall, sharpness of vision is adequate.  Glasses are from our Optical.      Objective:  See Ophthalmology Exam.       Assessment:  Increased esodeviation from previous exam.      Plan:  Glasses Rx given - increase prism as noted.  Will recheck MG panel - last tested almost 2 years ago.  Obtain labs - will call with results.   Return visit in 2 months for intraocular pressure check and dilation.     Adrian Suggs M.D.  [phone]           Again, thank you for allowing me to participate in the care of your patient.        Sincerely,        Adrian Suggs MD

## 2019-03-21 NOTE — PATIENT INSTRUCTIONS
Glasses Rx given - optional.  Will recheck MG panel - last tested almost 2 years ago.  Obtain labs - will call with results.   Return visit in 2 months for intraocular pressure check and dilation.     Adrian Suggs M.D.  [phone]

## 2019-03-22 LAB
ACHR BIND AB SER-SCNC: 0 NMOL/L (ref 0–0.4)
ACHR BLOCK AB/ACHR TOTAL SFR SER: 0 % (ref 0–26)
STRIA MUS IGG SER QL IF: NORMAL

## 2019-03-23 LAB — ACHR MOD AB/ACHR TOTAL SFR SER: 0 %

## 2019-04-10 ENCOUNTER — TELEPHONE (OUTPATIENT)
Dept: FAMILY MEDICINE | Facility: CLINIC | Age: 84
End: 2019-04-10

## 2019-04-10 NOTE — TELEPHONE ENCOUNTER
Forms received from: [facility]   Phone number listed: [phone]   Fax listed: [phone]  Date received: 04/10/2019  Form description: OT Discharge  Once forms are completed, please return to [facility] via fax.  Is patient requesting to be contacted when forms are completed: NA    Form placed: In provider's basket  Lacey Diamond

## 2019-05-23 ENCOUNTER — MEDICAL CORRESPONDENCE (OUTPATIENT)
Dept: HEALTH INFORMATION MANAGEMENT | Facility: CLINIC | Age: 84
End: 2019-05-23

## 2019-05-23 ENCOUNTER — OFFICE VISIT (OUTPATIENT)
Dept: FAMILY MEDICINE | Facility: CLINIC | Age: 84
End: 2019-05-23
Payer: MEDICARE

## 2019-05-23 VITALS — HEART RATE: 78 BPM | OXYGEN SATURATION: 100 % | SYSTOLIC BLOOD PRESSURE: 129 MMHG | DIASTOLIC BLOOD PRESSURE: 84 MMHG

## 2019-05-23 DIAGNOSIS — R29.6 FALLS FREQUENTLY: ICD-10-CM

## 2019-05-23 DIAGNOSIS — E53.8 VITAMIN B12 DEFICIENCY (NON ANEMIC): ICD-10-CM

## 2019-05-23 DIAGNOSIS — E55.9 VITAMIN D DEFICIENCY: ICD-10-CM

## 2019-05-23 DIAGNOSIS — D50.8 OTHER IRON DEFICIENCY ANEMIA: ICD-10-CM

## 2019-05-23 DIAGNOSIS — C91.10 CHRONIC LYMPHOCYTIC LEUKEMIA (H): ICD-10-CM

## 2019-05-23 DIAGNOSIS — N17.9 AKI (ACUTE KIDNEY INJURY) (H): Primary | ICD-10-CM

## 2019-05-23 DIAGNOSIS — I48.20 CHRONIC ATRIAL FIBRILLATION (H): ICD-10-CM

## 2019-05-23 LAB
ANION GAP SERPL CALCULATED.3IONS-SCNC: 8 MMOL/L (ref 3–14)
BUN SERPL-MCNC: 17 MG/DL (ref 7–30)
CALCIUM SERPL-MCNC: 9.1 MG/DL (ref 8.5–10.1)
CHLORIDE SERPL-SCNC: 107 MMOL/L (ref 94–109)
CO2 SERPL-SCNC: 24 MMOL/L (ref 20–32)
CREAT SERPL-MCNC: 0.88 MG/DL (ref 0.66–1.25)
DIFFERENTIAL METHOD BLD: ABNORMAL
ERYTHROCYTE [DISTWIDTH] IN BLOOD BY AUTOMATED COUNT: 14.9 % (ref 10–15)
GFR SERPL CREATININE-BSD FRML MDRD: 78 ML/MIN/{1.73_M2}
GLUCOSE SERPL-MCNC: 93 MG/DL (ref 70–99)
HCT VFR BLD AUTO: 39.9 % (ref 40–53)
HGB BLD-MCNC: 13 G/DL (ref 13.3–17.7)
IRON SATN MFR SERPL: 26 % (ref 15–46)
IRON SERPL-MCNC: 71 UG/DL (ref 35–180)
LYMPHOCYTES # BLD AUTO: 8.7 10E9/L (ref 0.8–5.3)
LYMPHOCYTES NFR BLD AUTO: 68 %
MAGNESIUM SERPL-MCNC: 2.3 MG/DL (ref 1.6–2.3)
MCH RBC QN AUTO: 31.7 PG (ref 26.5–33)
MCHC RBC AUTO-ENTMCNC: 32.6 G/DL (ref 31.5–36.5)
MCV RBC AUTO: 97 FL (ref 78–100)
MONOCYTES # BLD AUTO: 0.9 10E9/L (ref 0–1.3)
MONOCYTES NFR BLD AUTO: 7 %
NEUTROPHILS # BLD AUTO: 3.2 10E9/L (ref 1.6–8.3)
NEUTROPHILS NFR BLD AUTO: 25 %
PLATELET # BLD AUTO: 172 10E9/L (ref 150–450)
PLATELET # BLD EST: ABNORMAL 10*3/UL
POTASSIUM SERPL-SCNC: 4.3 MMOL/L (ref 3.4–5.3)
RBC # BLD AUTO: 4.1 10E12/L (ref 4.4–5.9)
SMUDGE CELLS BLD QL SMEAR: PRESENT
SODIUM SERPL-SCNC: 139 MMOL/L (ref 133–144)
T4 FREE SERPL-MCNC: 1.07 NG/DL (ref 0.76–1.46)
TIBC SERPL-MCNC: 269 UG/DL (ref 240–430)
TSH SERPL DL<=0.005 MIU/L-ACNC: 4.03 MU/L (ref 0.4–4)
WBC # BLD AUTO: 12.8 10E9/L (ref 4–11)

## 2019-05-23 PROCEDURE — 80048 BASIC METABOLIC PNL TOTAL CA: CPT | Performed by: INTERNAL MEDICINE

## 2019-05-23 PROCEDURE — 36415 COLL VENOUS BLD VENIPUNCTURE: CPT | Performed by: INTERNAL MEDICINE

## 2019-05-23 PROCEDURE — 84439 ASSAY OF FREE THYROXINE: CPT | Performed by: INTERNAL MEDICINE

## 2019-05-23 PROCEDURE — 83735 ASSAY OF MAGNESIUM: CPT | Performed by: INTERNAL MEDICINE

## 2019-05-23 PROCEDURE — 83540 ASSAY OF IRON: CPT | Performed by: INTERNAL MEDICINE

## 2019-05-23 PROCEDURE — 82306 VITAMIN D 25 HYDROXY: CPT | Performed by: INTERNAL MEDICINE

## 2019-05-23 PROCEDURE — 85025 COMPLETE CBC W/AUTO DIFF WBC: CPT | Performed by: INTERNAL MEDICINE

## 2019-05-23 PROCEDURE — 99214 OFFICE O/P EST MOD 30 MIN: CPT | Performed by: INTERNAL MEDICINE

## 2019-05-23 PROCEDURE — 83550 IRON BINDING TEST: CPT | Performed by: INTERNAL MEDICINE

## 2019-05-23 PROCEDURE — 84443 ASSAY THYROID STIM HORMONE: CPT | Performed by: INTERNAL MEDICINE

## 2019-05-23 NOTE — PROGRESS NOTES
Subjective     Bijan Daniel is a 84 year old male who presents to clinic today for the following health issues:    HPI   Cough; throat cough  Review medication  Fatigue; sleeping a lot  Weakness; getting worse. Possible PT  Possible DME for hospital bed or a lifting chair  Restless legs at night; worse    Gaining strength in getting up and chairs and bed.  3 months no physical therpay  Helpful  excercises every day  Home   Urosepsis and in the ICU with left hip surgery  afib              Patient Active Problem List   Diagnosis     Chronic atrial fibrillation (H)     MARI (obstructive sleep apnea)     Vitamin B12 deficiency (non anemic)     Moderate major depression (H)     Primary osteoarthritis of left shoulder     Acute mesenteric ischemia (H)     MADI (acute kidney injury) (H)     Chronic lymphocytic leukemia (H)     Osteomyelitis of arm (H)     Past Surgical History:   Procedure Laterality Date     APPENDECTOMY Right      AS TOTAL HIP ARTHROPLASTY Right 2008     AS TOTAL HIP ARTHROPLASTY Left 2009     C ANESTH,SHOULDER REPLACEMENT       removal shoulder arthroplasty         Social History     Tobacco Use     Smoking status: Former Smoker     Smokeless tobacco: Never Used   Substance Use Topics     Alcohol use: Yes     Comment: 14 or less     No family history on file.      Current Outpatient Medications   Medication Sig Dispense Refill     acetaminophen 500 MG CAPS Take 1,000 mg by mouth 3 times daily 60 capsule      amoxicillin (AMOXIL) 250 MG capsule Take 250 mg by mouth daily        Apixaban (ELIQUIS PO) Take 5 mg by mouth 2 times daily       Calcium (OYSTER-BARBARA 500 PO) Take 2 tablets by mouth 2 times daily       diphenhydrAMINE HCl 50 MG TABS Take 25 mg by mouth 2 times daily as needed 56 tablet      docusate sodium (COLACE) 50 MG capsule Take 1 capsule (50 mg) by mouth daily as needed for constipation 60 capsule      ipratropium (ATROVENT) 0.06 % spray Spray 1-2 sprays into both nostrils 4 times daily as  needed for rhinitis       metoprolol (LOPRESSOR) 100 MG tablet TAKE 1 TABLET ORALLY TWICE DAILY       omeprazole (PRILOSEC) 20 MG CR capsule Take 2 capsules (40 mg) by mouth daily 30 capsule      senna-docusate (SENOKOT-S;PERICOLACE) 8.6-50 MG per tablet Take 4 tablets by mouth daily as needed 100 tablet      THIAMINE HCL PO Take 100 mg by mouth daily       venlafaxine (EFFEXOR-XR) 37.5 MG 24 hr capsule Take 1 capsule (37.5 mg) by mouth daily 90 capsule 3     Allergies   Allergen Reactions     Nuts Anaphylaxis     Recent Labs   Lab Test 05/23/19  1155 09/06/18  1250  06/27/18  1420   LDL  --   --   --  90   HDL  --   --   --  57   TRIG  --   --   --  95   CR 0.88 0.90   < > 1.03   GFRESTIMATED 78 81   < > 69   GFRESTBLACK >90 >90   < > 83   POTASSIUM 4.3 4.5   < > 4.7   TSH 4.03*  --   --   --     < > = values in this interval not displayed.          Reviewed and updated as needed this visit by Provider         Review of Systems   ROS COMP: Constitutional, HEENT, cardiovascular, pulmonary, gi and gu systems are negative, except as otherwise noted.      Objective    /84 (BP Location: Left arm, Patient Position: Chair, Cuff Size: Adult Regular)   Pulse 78   SpO2 100%   There is no height or weight on file to calculate BMI.  Physical Exam   GENERAL: healthy, alert and no distress  EYES: Eyes grossly normal to inspection, PERRL and conjunctivae and sclerae normal  HENT: ear canals and TM's normal, nose and mouth without ulcers or lesions  NECK: no adenopathy, no asymmetry, masses, or scars and thyroid normal to palpation  RESP: lungs clear to auscultation - no rales, rhonchi or wheezes  CV: regular rate and rhythm, normal S1 S2, no S3 or S4, no murmur, click or rub, no peripheral edema and peripheral pulses strong  ABDOMEN: soft, nontender, no hepatosplenomegaly, no masses and bowel sounds normal  MS: no gross musculoskeletal defects noted, no edema  SKIN: no suspicious lesions or rashes  NEURO: Normal strength  and tone, mentation intact and speech normal  PSYCH: mentation appears normal, affect normal/bright    Diagnostic Test Results:  Labs reviewed in Epic  Results for orders placed or performed in visit on 05/23/19   CBC with platelets and differential   Result Value Ref Range    WBC 12.8 (H) 4.0 - 11.0 10e9/L    RBC Count 4.10 (L) 4.4 - 5.9 10e12/L    Hemoglobin 13.0 (L) 13.3 - 17.7 g/dL    Hematocrit 39.9 (L) 40.0 - 53.0 %    MCV 97 78 - 100 fl    MCH 31.7 26.5 - 33.0 pg    MCHC 32.6 31.5 - 36.5 g/dL    RDW 14.9 10.0 - 15.0 %    Platelet Count 172 150 - 450 10e9/L    % Neutrophils 25.0 %    % Lymphocytes 68.0 %    % Monocytes 7.0 %    Absolute Neutrophil 3.2 1.6 - 8.3 10e9/L    Absolute Lymphocytes 8.7 (H) 0.8 - 5.3 10e9/L    Absolute Monocytes 0.9 0.0 - 1.3 10e9/L    Smudge Cells Present     Platelet Estimate       Automated count confirmed.  Platelet morphology is normal.    Diff Method Manual Differential    TSH with free T4 reflex   Result Value Ref Range    TSH 4.03 (H) 0.40 - 4.00 mU/L   Basic metabolic panel   Result Value Ref Range    Sodium 139 133 - 144 mmol/L    Potassium 4.3 3.4 - 5.3 mmol/L    Chloride 107 94 - 109 mmol/L    Carbon Dioxide 24 20 - 32 mmol/L    Anion Gap 8 3 - 14 mmol/L    Glucose 93 70 - 99 mg/dL    Urea Nitrogen 17 7 - 30 mg/dL    Creatinine 0.88 0.66 - 1.25 mg/dL    GFR Estimate 78 >60 mL/min/[1.73_m2]    GFR Estimate If Black >90 >60 mL/min/[1.73_m2]    Calcium 9.1 8.5 - 10.1 mg/dL   Magnesium   Result Value Ref Range    Magnesium 2.3 1.6 - 2.3 mg/dL   Vitamin D Deficiency   Result Value Ref Range    Vitamin D Deficiency screening 31 20 - 75 ug/L   Iron and iron binding capacity   Result Value Ref Range    Iron 71 35 - 180 ug/dL    Iron Binding Cap 269 240 - 430 ug/dL    Iron Saturation Index 26 15 - 46 %   T4 free   Result Value Ref Range    T4 Free 1.07 0.76 - 1.46 ng/dL           Assessment & Plan       ICD-10-CM    1. MADI (acute kidney injury) (H) N17.9 T4 free     T4 free   2.  Chronic atrial fibrillation (H) I48.2 TSH with free T4 reflex     Basic metabolic panel     Magnesium   3. Chronic lymphocytic leukemia (H) C91.90 CBC with platelets and differential   4. Vitamin B12 deficiency (non anemic) E53.8    5. Vitamin D deficiency E55.9 Vitamin D Deficiency   6. Falls frequently R29.6 HOME CARE NURSING REFERRAL   7. Other iron deficiency anemia D50.8 Iron and iron binding capacity          balance issues of shuffling gait, incoordination, freezing of gait, and festinating gait with intact LE ROM and strength    Patient is in home care with a diagnosis of weakness, falls frequently from CLL  Patient with worsening balance , shuffled gait, falls risk.  Needs adjustable height to transfer to and from the bed to do ADLs bathing and bathroom duties, patient has core and peripheral muscle weakness increasing his fall risk and has fallen  Has underslying weakness from the urosepsis and left hip surgery which contribute to weakness and fall risk    Needs positioning help post-operatively for over 1 month, needs al;leviatinon of pain and frequent changes in body position to avoid sores.  Has weakness, hip surgery , CLL needs assistance with body changes and is helped by home care and family          No follow-ups on file.    Oc Randall MD  [facility]

## 2019-05-24 LAB — DEPRECATED CALCIDIOL+CALCIFEROL SERPL-MC: 31 UG/L (ref 20–75)

## 2019-05-25 ENCOUNTER — DOCUMENTATION ONLY (OUTPATIENT)
Dept: FAMILY MEDICINE | Facility: CLINIC | Age: 84
End: 2019-05-25

## 2019-05-25 NOTE — PROGRESS NOTES
Dear Dr. Randall,   Medicare Home Health regulations requires [facility] to provide an initial assessment visit either within 48 hours of the patient's return home, or on the physician ordered Start of Care date.    There will be a delay in the Initial Assessment for Bijan Daniel; MRN [id number]  We will update you when the assessment is completed     Sincerely [facility]  Jessica Bustamante  [phone]

## 2019-05-26 ENCOUNTER — MEDICAL CORRESPONDENCE (OUTPATIENT)
Dept: HEALTH INFORMATION MANAGEMENT | Facility: CLINIC | Age: 84
End: 2019-05-26

## 2019-05-28 ENCOUNTER — TELEPHONE (OUTPATIENT)
Dept: FAMILY MEDICINE | Facility: CLINIC | Age: 84
End: 2019-05-28

## 2019-05-28 NOTE — TELEPHONE ENCOUNTER
Reason for Call: Request for an order or referral:    Order or referral being requested: April with [facility] calling for SN, PT, and OT. SN once a week x 4 weeks and 2 PRN, PT and OT eval and treat    Date needed: as soon as possible    Has the patient been seen by the PCP for this problem? Not Applicable    Additional comments:     Phone number Patient can be reached at:  Other phone number:  [phone]    Best Time:  any    Can we leave a detailed message on this number?  YES    Call taken on 5/28/2019 at 9:04 AM by Lacey Diamond

## 2019-05-28 NOTE — TELEPHONE ENCOUNTER
Called April at [phone] No answer, left message to call nurse line at [phone]     Anna Cuadra RN  [facility]

## 2019-05-30 ENCOUNTER — DOCUMENTATION ONLY (OUTPATIENT)
Dept: CARE COORDINATION | Facility: CLINIC | Age: 84
End: 2019-05-30

## 2019-05-30 NOTE — TELEPHONE ENCOUNTER
When called April last evening on another patient April said he seen the okay for orders in Epic for this patient.    Anna Cuadra RN  [facility]

## 2019-05-30 NOTE — PROGRESS NOTES
[facility] now requests orders and shares plan of care/discharge summaries for some patients through [employer].  Please REPLY TO THIS MESSAGE OR ROUTE BACK TO THE AUTHOR in order to give authorization for orders when needed.  This is considered a verbal order, you will still receive a faxed copy of orders for signature.  Thank you for your assistance in improving collaboration for our patients.    ORDER: PT 2w3 for environmental modifications, progressive HEP, transfer training, gait training, and fall prevention.    Also, I have recommended that patient follow-up with neurology due to specific balance issues of shuffling gait, incoordination, freezing of gait, and festinating gait with intact LE ROM and strength. He has been to [facility] in the past, and they may return there. If you have recommendations for specific neurologists or clinics, please let patient/spouse know.     Thank you,  Korin Ventura, PT, DPT  [email or web address]  [phone]

## 2019-05-31 NOTE — PROGRESS NOTES
Korin from [facility] calling for verbal orders. See her documentation below for details.    Thanks!  Cedric Jiang

## 2019-06-03 ENCOUNTER — DOCUMENTATION ONLY (OUTPATIENT)
Dept: FAMILY MEDICINE | Facility: CLINIC | Age: 84
End: 2019-06-03

## 2019-06-03 NOTE — PROGRESS NOTES
[facility] now requests orders and shares plan of care/discharge summaries for some patients through [employer].  Please REPLY TO THIS MESSAGE OR ROUTE BACK TO THE AUTHOR in order to give authorization for orders when needed.  This is considered a verbal order, you will still receive a faxed copy of orders for signature.  Thank you for your assistance in improving collaboration for our patients.    Pt would like to pursue hospital bed. He reports this was discussed at last MD visit. Would you be wiling to send F2f notes and prescription for hosp bed to [facility]? The fax is [phone].  Thanks    Bee TAVERAS/L  [phone]  [email or web address]

## 2019-06-06 ENCOUNTER — TELEPHONE (OUTPATIENT)
Dept: FAMILY MEDICINE | Facility: CLINIC | Age: 84
End: 2019-06-06

## 2019-06-06 NOTE — TELEPHONE ENCOUNTER
Requested information faxed to number provided.    [facility]  Team 3 Coordinator

## 2019-06-06 NOTE — TELEPHONE ENCOUNTER
Forms received from: [facility]   Phone number listed: [phone]   Fax listed: [phone]  Date received: 06/06/19  Form description: OT/PT/SN  Once forms are completed, please return to [facility] via Fax.  Is patient requesting to be contacted when forms are completed: NA    Form placed: in providers ben Arthur

## 2019-06-17 ENCOUNTER — TELEPHONE (OUTPATIENT)
Dept: FAMILY MEDICINE | Facility: CLINIC | Age: 84
End: 2019-06-17

## 2019-06-17 NOTE — TELEPHONE ENCOUNTER
Forms received from: [facility]   Phone number listed: [phone]   Fax listed: [phone]  Date received: 06/17/2019  Form description: home health certification and plan of care  Once forms are completed, please return to [facility]  via fax.  Form placed: in Team 3 nurse folder  Julisa Garcia

## 2019-06-19 ENCOUNTER — TRANSFERRED RECORDS (OUTPATIENT)
Dept: HEALTH INFORMATION MANAGEMENT | Facility: CLINIC | Age: 84
End: 2019-06-19

## 2019-06-19 DIAGNOSIS — Z53.9 DIAGNOSIS NOT YET DEFINED: Primary | ICD-10-CM

## 2019-06-19 PROCEDURE — G0180 MD CERTIFICATION HHA PATIENT: HCPCS | Performed by: INTERNAL MEDICINE

## 2019-06-19 RX ORDER — AMOXICILLIN 250 MG/1
250 CAPSULE ORAL DAILY
COMMUNITY
Start: 2019-06-19

## 2019-06-19 RX ORDER — CALCIUM CARBONATE 500(1250)
2 TABLET ORAL DAILY
COMMUNITY
Start: 2019-06-19

## 2019-06-19 NOTE — TELEPHONE ENCOUNTER
Requested information faxed to number provided.  [facility]  Team 3 Coordinator

## 2019-06-19 NOTE — TELEPHONE ENCOUNTER
Medications reconciled on [facility] form, changes noted on form.  Form to PCP for signature.    Anna Cuadra RN  [facility]

## 2019-06-20 ENCOUNTER — DOCUMENTATION ONLY (OUTPATIENT)
Dept: CARE COORDINATION | Facility: CLINIC | Age: 84
End: 2019-06-20

## 2019-06-20 NOTE — PROGRESS NOTES
[facility] now requests orders and shares plan of care/discharge summaries for some patients through [employer].  Please REPLY TO THIS MESSAGE OR ROUTE BACK TO THE AUTHOR in order to give authorization for orders when needed.  This is considered a verbal order, you will still receive a faxed copy of orders for signature.  Thank you for your assistance in improving collaboration for our patients.    FYI  Pt fell yesterday in home, up with assist from 911. No apparent injury, pt denies any pain    ORDER  Request ok to continue home PT 2x/wk 1 wk, 1x/wk 3 wks for ongoing strengthening, balance, gait training and falls prevention education    Nida Evans PT  [phone]  [email or web address]

## 2019-06-26 ENCOUNTER — TELEPHONE (OUTPATIENT)
Dept: FAMILY MEDICINE | Facility: CLINIC | Age: 84
End: 2019-06-26

## 2019-06-26 NOTE — TELEPHONE ENCOUNTER
Forms received from: [facility]   Phone number listed: [phone]   Fax listed: [phone]  Date received: 06/26/2019  Form description: OT 1 every 10 days 1, PT 1 every 10 days 1, PT 2w3  Once forms are completed, please return to [facility] via fax.  Is patient requesting to be contacted when forms are completed: NA    Form placed: In provider's basket  Lacey Diamond

## 2019-06-27 ENCOUNTER — TELEPHONE (OUTPATIENT)
Dept: FAMILY MEDICINE | Facility: CLINIC | Age: 84
End: 2019-06-27

## 2019-06-27 NOTE — TELEPHONE ENCOUNTER
Forms received from:  Home Care   Phone number listed: [phone]   Fax listed: [phone]  Date received: 06/27/19  Form description: PT  Once forms are completed, please return to  Home Care via Fax.  Is patient requesting to be contacted when forms are completed: NA   Form placed: in antoinette Arthur

## 2019-07-10 ENCOUNTER — DOCUMENTATION ONLY (OUTPATIENT)
Dept: CARE COORDINATION | Facility: CLINIC | Age: 84
End: 2019-07-10

## 2019-07-10 NOTE — PROGRESS NOTES
[facility] now requests orders and shares plan of care/discharge summaries for some patients through [employer].  Please REPLY TO THIS MESSAGE OR ROUTE BACK TO THE AUTHOR in order to give authorization for orders when needed.  This is considered a verbal order, you will still receive a faxed copy of orders for signature.  Thank you for your assistance in improving collaboration for our patients.    FYI  Pt reporting a fall on 7/4/2019 at 10 pm while exiting the bathroom, he and wife able to get up by getting to the bed and getting up  Mild bruising on lower buttocks, no open areas, pt denies any pain    Nida Evans PT  [phone]  [email or web address]

## 2019-07-17 ENCOUNTER — OFFICE VISIT (OUTPATIENT)
Dept: FAMILY MEDICINE | Facility: CLINIC | Age: 84
End: 2019-07-17
Payer: MEDICARE

## 2019-07-17 VITALS
HEART RATE: 85 BPM | DIASTOLIC BLOOD PRESSURE: 87 MMHG | OXYGEN SATURATION: 97 % | WEIGHT: 191 LBS | BODY MASS INDEX: 24.52 KG/M2 | SYSTOLIC BLOOD PRESSURE: 128 MMHG

## 2019-07-17 DIAGNOSIS — J44.9 CHRONIC OBSTRUCTIVE PULMONARY DISEASE, UNSPECIFIED COPD TYPE (H): ICD-10-CM

## 2019-07-17 DIAGNOSIS — M62.81 GENERALIZED MUSCLE WEAKNESS: Primary | ICD-10-CM

## 2019-07-17 DIAGNOSIS — R26.9 ABNORMAL GAIT: ICD-10-CM

## 2019-07-17 DIAGNOSIS — F32.0 MILD MAJOR DEPRESSION (H): ICD-10-CM

## 2019-07-17 DIAGNOSIS — R35.0 URINARY FREQUENCY: ICD-10-CM

## 2019-07-17 DIAGNOSIS — D69.6 THROMBOCYTOPENIA (H): ICD-10-CM

## 2019-07-17 PROCEDURE — 99214 OFFICE O/P EST MOD 30 MIN: CPT | Performed by: INTERNAL MEDICINE

## 2019-07-17 NOTE — PROGRESS NOTES
Subjective     Bijan Daniel is a 84 year old male who presents to clinic today for the following health issues:    HPI   Recheck the weakness all over body; states some days are better then others.   He is still able to complete daily activities however dose need help getting up from a sitting position.    Urine inconsistence has started recently    Weight loss; 10 lbs loss in the last 6 weeks  Urine urgency is worsening   eating a good appetite with increased appetitie  Knee jerks up over 1/2 a hour.  Last year  Having some falls  Stone faces. Rigidity in the legs especially at bedtime  Slow slurred speech        Patient Active Problem List   Diagnosis     Chronic atrial fibrillation (H)     MARI (obstructive sleep apnea)     Vitamin B12 deficiency (non anemic)     Moderate major depression (H)     Primary osteoarthritis of left shoulder     Acute mesenteric ischemia (H)     MADI (acute kidney injury) (H)     Chronic lymphocytic leukemia (H)     Osteomyelitis of arm (H)     Chronic obstructive pulmonary disease, unspecified COPD type (H)     Thrombocytopenia (H)     Past Surgical History:   Procedure Laterality Date     APPENDECTOMY Right      AS TOTAL HIP ARTHROPLASTY Right 2008     AS TOTAL HIP ARTHROPLASTY Left 2009     C ANESTH,SHOULDER REPLACEMENT       removal shoulder arthroplasty         Social History     Tobacco Use     Smoking status: Former Smoker     Smokeless tobacco: Never Used   Substance Use Topics     Alcohol use: Yes     Comment: 14 or less     No family history on file.      Current Outpatient Medications   Medication Sig Dispense Refill     acetaminophen 500 MG CAPS Take 1,000 mg by mouth 3 times daily 60 capsule      amoxicillin (AMOXIL) 250 MG capsule Take 1 capsule (250 mg) by mouth daily       Apixaban (ELIQUIS PO) Take 5 mg by mouth 2 times daily       calcium carbonate (CALCIUM CARBONATE) 500 MG tablet Take by mouth 2 times daily       diphenhydrAMINE HCl 50 MG TABS Take 25 mg by mouth 2  times daily as needed 56 tablet      docusate sodium (COLACE) 50 MG capsule Take 1 capsule (50 mg) by mouth daily as needed for constipation 60 capsule      ipratropium (ATROVENT) 0.06 % spray Spray 1-2 sprays into both nostrils 4 times daily as needed for rhinitis       metoprolol (LOPRESSOR) 100 MG tablet TAKE 1 TABLET ORALLY TWICE DAILY       omeprazole (PRILOSEC) 20 MG CR capsule Take 2 capsules (40 mg) by mouth daily 30 capsule      order for DME Equipment being ordered: Hospital Bed 1 each 0     senna-docusate (SENOKOT-S;PERICOLACE) 8.6-50 MG per tablet Take 4 tablets by mouth daily as needed 100 tablet      THIAMINE HCL PO Take 100 mg by mouth daily       venlafaxine (EFFEXOR-XR) 37.5 MG 24 hr capsule Take 1 capsule (37.5 mg) by mouth daily 90 capsule 3     Allergies   Allergen Reactions     Nuts Anaphylaxis     Recent Labs   Lab Test 05/23/19  1155 09/06/18  1250  06/27/18  1420   LDL  --   --   --  90   HDL  --   --   --  57   TRIG  --   --   --  95   CR 0.88 0.90   < > 1.03   GFRESTIMATED 78 81   < > 69   GFRESTBLACK >90 >90   < > 83   POTASSIUM 4.3 4.5   < > 4.7   TSH 4.03*  --   --   --     < > = values in this interval not displayed.          Reviewed and updated as needed this visit by Provider         Review of Systems   ROS COMP: Constitutional, HEENT, cardiovascular, pulmonary, gi and gu systems are negative, except as otherwise noted.      Objective    /87 (BP Location: Right arm, Patient Position: Chair, Cuff Size: Adult Regular)   Pulse 85   Wt 86.6 kg (191 lb)   SpO2 97%   BMI 24.52 kg/m    Body mass index is 24.52 kg/m .  Physical Exam   GENERAL: healthy, alert and no distress  EYES: Eyes grossly normal to inspection, PERRL and conjunctivae and sclerae normal  HENT: ear canals and TM's normal, nose and mouth without ulcers or lesions  NECK: no adenopathy, no asymmetry, masses, or scars and thyroid normal to palpation  RESP: lungs clear to auscultation - no rales, rhonchi or  wheezes  CV: regular rate and rhythm, normal S1 S2, no S3 or S4, no murmur, click or rub, no peripheral edema and peripheral pulses strong  ABDOMEN: soft, nontender, no hepatosplenomegaly, no masses and bowel sounds normal  MS: no gross musculoskeletal defects noted, no edema  SKIN: no suspicious lesions or rashes  NEURO: Normal strength and tone, mentation intact and speech normal  BACK: no CVA tenderness, no paralumbar tenderness  PSYCH: mentation appears normal, affect normal/bright    Diagnostic Test Results:  Labs reviewed in Epic  Results for orders placed or performed in visit on 05/23/19   CBC with platelets and differential   Result Value Ref Range    WBC 12.8 (H) 4.0 - 11.0 10e9/L    RBC Count 4.10 (L) 4.4 - 5.9 10e12/L    Hemoglobin 13.0 (L) 13.3 - 17.7 g/dL    Hematocrit 39.9 (L) 40.0 - 53.0 %    MCV 97 78 - 100 fl    MCH 31.7 26.5 - 33.0 pg    MCHC 32.6 31.5 - 36.5 g/dL    RDW 14.9 10.0 - 15.0 %    Platelet Count 172 150 - 450 10e9/L    % Neutrophils 25.0 %    % Lymphocytes 68.0 %    % Monocytes 7.0 %    Absolute Neutrophil 3.2 1.6 - 8.3 10e9/L    Absolute Lymphocytes 8.7 (H) 0.8 - 5.3 10e9/L    Absolute Monocytes 0.9 0.0 - 1.3 10e9/L    Smudge Cells Present     Platelet Estimate       Automated count confirmed.  Platelet morphology is normal.    Diff Method Manual Differential    TSH with free T4 reflex   Result Value Ref Range    TSH 4.03 (H) 0.40 - 4.00 mU/L   Basic metabolic panel   Result Value Ref Range    Sodium 139 133 - 144 mmol/L    Potassium 4.3 3.4 - 5.3 mmol/L    Chloride 107 94 - 109 mmol/L    Carbon Dioxide 24 20 - 32 mmol/L    Anion Gap 8 3 - 14 mmol/L    Glucose 93 70 - 99 mg/dL    Urea Nitrogen 17 7 - 30 mg/dL    Creatinine 0.88 0.66 - 1.25 mg/dL    GFR Estimate 78 >60 mL/min/[1.73_m2]    GFR Estimate If Black >90 >60 mL/min/[1.73_m2]    Calcium 9.1 8.5 - 10.1 mg/dL   Magnesium   Result Value Ref Range    Magnesium 2.3 1.6 - 2.3 mg/dL   Vitamin D Deficiency   Result Value Ref Range     Vitamin D Deficiency screening 31 20 - 75 ug/L   Iron and iron binding capacity   Result Value Ref Range    Iron 71 35 - 180 ug/dL    Iron Binding Cap 269 240 - 430 ug/dL    Iron Saturation Index 26 15 - 46 %   T4 free   Result Value Ref Range    T4 Free 1.07 0.76 - 1.46 ng/dL           Assessment & Plan       ICD-10-CM    1. Generalized muscle weakness M62.81    2. Mild major depression (H) F32.0    3. Chronic obstructive pulmonary disease, unspecified COPD type (H) J44.9    4. Thrombocytopenia (H) D69.6    5. Urinary frequency R35.0         I will recommend neurology assessment with features of early Parkinsons or NPH    Most recent MRI not suggestive but having memory loss, abnormal gait, muslce rigidity and restless legs and increased urine frequency      Work on weight loss  Regular exercise    No follow-ups on file.    Oc Randall MD  [facility]

## 2019-07-22 ENCOUNTER — TRANSFERRED RECORDS (OUTPATIENT)
Dept: HEALTH INFORMATION MANAGEMENT | Facility: CLINIC | Age: 84
End: 2019-07-22

## 2019-08-06 ENCOUNTER — TRANSFERRED RECORDS (OUTPATIENT)
Dept: HEALTH INFORMATION MANAGEMENT | Facility: CLINIC | Age: 84
End: 2019-08-06

## 2019-10-01 ENCOUNTER — HEALTH MAINTENANCE LETTER (OUTPATIENT)
Age: 84
End: 2019-10-01

## 2019-10-16 ENCOUNTER — TRANSFERRED RECORDS (OUTPATIENT)
Dept: HEALTH INFORMATION MANAGEMENT | Facility: CLINIC | Age: 84
End: 2019-10-16

## 2019-10-16 LAB
AST SERPL-CCNC: 19 U/L (ref 5–34)
CREAT SERPL-MCNC: 1 MG/DL (ref 0.7–1.2)
GLUCOSE SERPL-MCNC: 110 MG/DL (ref 70–105)
POTASSIUM SERPL-SCNC: 4 MMOL/L (ref 3.5–5)

## 2019-10-25 NOTE — PROGRESS NOTES
Chief Complaint   Patient presents with     Hearing Problem     hearing loss     History of Present Illness   Bijan Daniel is a 85 year old male who presents to me today for ear evaluation.  I am seeing this patient in consultation for sensorineural hearing loss at the request of the provider Dr. Randall.  The patient reports significant hearing loss for the last 20+ years.  It was gradual in onset.  The patient has noticed increased difficulty hearing certain sounds and difficulty in understanding others, especially in noisy or crowded situations.  There is no history of recent head trauma, or chronic ear disease or ear surgery.  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  He also was working with [employer]. No family history of hearing loss at a young age. The patient denies vertigo, otorrhea, otalgia, or tinnitus.    Past Medical History  Patient Active Problem List   Diagnosis     Chronic atrial fibrillation     MARI (obstructive sleep apnea)     Vitamin B12 deficiency (non anemic)     Moderate major depression (H)     Primary osteoarthritis of left shoulder     Acute mesenteric ischemia (H)     MADI (acute kidney injury) (H)     Chronic lymphocytic leukemia (H)     Osteomyelitis of arm (H)     Chronic obstructive pulmonary disease, unspecified COPD type (H)     Thrombocytopenia (H)     Current Medications     Current Outpatient Medications:      acetaminophen 500 MG CAPS, Take 1,000 mg by mouth 3 times daily, Disp: 60 capsule, Rfl:      amoxicillin (AMOXIL) 250 MG capsule, Take 1 capsule (250 mg) by mouth daily, Disp: , Rfl:      Apixaban (ELIQUIS PO), Take 5 mg by mouth 2 times daily, Disp: , Rfl:      calcium carbonate (CALCIUM CARBONATE) 500 MG tablet, Take by mouth 2 times daily, Disp: , Rfl:      diphenhydrAMINE HCl 50 MG TABS, Take 25 mg by mouth 2 times daily as needed, Disp: 56 tablet, Rfl:      docusate sodium (COLACE) 50 MG capsule, Take 1 capsule (50 mg) by  mouth daily as needed for constipation, Disp: 60 capsule, Rfl:      ipratropium (ATROVENT) 0.06 % spray, Spray 1-2 sprays into both nostrils 4 times daily as needed for rhinitis, Disp: , Rfl:      metoprolol (LOPRESSOR) 100 MG tablet, TAKE 1 TABLET ORALLY TWICE DAILY, Disp: , Rfl:      omeprazole (PRILOSEC) 20 MG CR capsule, Take 2 capsules (40 mg) by mouth daily, Disp: 30 capsule, Rfl:      order for DME, Equipment being ordered: Hospital Bed, Disp: 1 each, Rfl: 0     senna-docusate (SENOKOT-S;PERICOLACE) 8.6-50 MG per tablet, Take 4 tablets by mouth daily as needed, Disp: 100 tablet, Rfl:      THIAMINE HCL PO, Take 100 mg by mouth daily, Disp: , Rfl:      venlafaxine (EFFEXOR-XR) 37.5 MG 24 hr capsule, Take 1 capsule (37.5 mg) by mouth daily, Disp: 90 capsule, Rfl: 3    Allergies  Allergies   Allergen Reactions     Nuts Anaphylaxis       Social History   Social History     Socioeconomic History     Marital status:      Spouse name: Not on file     Number of children: Not on file     Years of education: Not on file     Highest education level: Not on file   Occupational History     Occupation: retired     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   Social Needs     Financial resource strain: Not on file     Food insecurity:     Worry: Not on file     Inability: Not on file     Transportation needs:     Medical: Not on file     Non-medical: Not on file   Tobacco Use     Smoking status: Former Smoker     Smokeless tobacco: Never Used   Substance and Sexual Activity     Alcohol use: Yes     Comment: 14 or less     Drug use: No     Sexual activity: Never   Lifestyle     Physical activity:     Days per week: Not on file     Minutes per session: Not on file     Stress: Not on file   Relationships     Social connections:     Talks on phone: Not on file     Gets together: Not on file     Attends Confucianism service: Not on file     Active member of club or organization: Not on file     Attends meetings of clubs or  "organizations: Not on file     Relationship status: Not on file     Intimate partner violence:     Fear of current or ex partner: Not on file     Emotionally abused: Not on file     Physically abused: Not on file     Forced sexual activity: Not on file   Other Topics Concern     Parent/sibling w/ CABG, MI or angioplasty before 65F 55M? Not Asked   Social History Narrative     Not on file       Family History  No family history on file.    Review of Systems  As per HPI and PMHx, otherwise 10+ comprehensive system review is negative.    Physical Exam  BP (!) 143/89   Pulse 76   Ht 1.88 m (6' 2\")   Wt 89.8 kg (198 lb)   SpO2 99%   BMI 25.42 kg/m    GENERAL: Patient is a pleasant, cooperative 85 year old male in no acute distress.  HEAD: Normocephalic, atraumatic.  Hair and scalp are normal.  EYES: Pupils are equal, round, reactive to light and accommodation.  Extraocular movements are intact.  The sclera nonicteric without injection.  The extraocular structures are normal.  EARS: See below.  NOSE: Nares are patent.  Nasal mucosa is pink and moist.  Negative anterior rhinoscopy.  NEUROLOGIC: Cranial nerves II through XII are grossly intact.  Voice is strong.  Patient is House-Brackman I/VI bilaterally.  CARDIOVASCULAR: Extremities are warm and well-perfused.  No significant peripheral edema.  RESPIRATORY: Patient has nonlabored breathing without cough, wheeze, stridor.  PSYCHIATRIC: Patient is alert and oriented.  Mood and affect appear normal.  SKIN: Warm and dry.  No scalp, face, or neck lesions noted.    Physical Exam and Procedure    EARS: Normal shape and symmetry.  No tenderness when palpating the mastoid or tragal areas bilaterally.  The ears were then examined under the otic binocular microscope.  Otoscopic exam on the right reveals a clear canal.  The right tympanic membrane was round, intact without evidence of effusion.  No retraction, granulation, drainage, or evidence of cholesteatoma.      Attention " was turned to the left ear.  Otoscopic exam on the left reveals impacted cerumen down to the level of the tympanic membrane.  The cerumen was removed with an alligator forceps.  The left tympanic membrane was round, intact without evidence of effusion.  No retraction, granulation, drainage, or evidence of cholesteatoma.      Audiogram  The patient underwent an audiogram performed today.  My review of the audiogram shows moderate sloping to severe sensorineural hearing loss in the right ear and moderately severe sloping to severe sensorineural hearing loss in the left ear.  Pure-tone average is 55 dB on the right and 67 dB on the left.  Speech reception threshhold is 75 dB on the right and 60 dB on the left.  The patient had 32% word recognition on the right and 32% word recognition on the left.  The patient had a type A tympanogram on the right and a type A tympanogram on the left.     Assessment and Plan     ICD-10-CM    1. Sensorineural hearing loss, bilateral H90.3 AUDIOLOGY ADULT REFERRAL   2. Impacted cerumen of left ear H61.22 AUDIOLOGY ADULT REFERRAL     Remove Cerumen     It was my pleasure seeing Bijan Daniel today in clinic.  The patient presents today with hearing loss.  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 I can find no evidence of serious CNS disorders or other complicating factors that could be causing this.  We spent the remainder of today's visit on education. We discussed hearing protection in noisy environments.    The patient will follow up as necessary for worsening symptoms or changes in symptoms. I have also [facility] new hearing aids.  We also discussed testing for cochlear implantation if the hearing aids are not helpful.      Andrew Richard MD  Department of Otolarygology-Head and Neck Surgery  [facility]

## 2019-10-28 ENCOUNTER — OFFICE VISIT (OUTPATIENT)
Dept: OTOLARYNGOLOGY | Facility: CLINIC | Age: 84
End: 2019-10-28
Payer: MEDICARE

## 2019-10-28 ENCOUNTER — OFFICE VISIT (OUTPATIENT)
Dept: AUDIOLOGY | Facility: CLINIC | Age: 84
End: 2019-10-28
Payer: MEDICARE

## 2019-10-28 VITALS
BODY MASS INDEX: 25.41 KG/M2 | OXYGEN SATURATION: 99 % | SYSTOLIC BLOOD PRESSURE: 143 MMHG | WEIGHT: 198 LBS | HEART RATE: 76 BPM | DIASTOLIC BLOOD PRESSURE: 89 MMHG | HEIGHT: 74 IN

## 2019-10-28 DIAGNOSIS — H90.3 SENSORINEURAL HEARING LOSS, BILATERAL: Primary | ICD-10-CM

## 2019-10-28 DIAGNOSIS — H61.22 IMPACTED CERUMEN OF LEFT EAR: ICD-10-CM

## 2019-10-28 PROCEDURE — 99203 OFFICE O/P NEW LOW 30 MIN: CPT | Mod: 25 | Performed by: OTOLARYNGOLOGY

## 2019-10-28 PROCEDURE — 92550 TYMPANOMETRY & REFLEX THRESH: CPT | Performed by: AUDIOLOGIST

## 2019-10-28 PROCEDURE — 92557 COMPREHENSIVE HEARING TEST: CPT | Performed by: AUDIOLOGIST

## 2019-10-28 PROCEDURE — 92504 EAR MICROSCOPY EXAMINATION: CPT | Performed by: OTOLARYNGOLOGY

## 2019-10-28 ASSESSMENT — MIFFLIN-ST. JEOR: SCORE: 1652.87

## 2019-10-28 NOTE — PATIENT INSTRUCTIONS
General Scheduling Information  To schedule your CT/MRI scan, please contact [facility] at [phone]   [address] [place] [address]    To schedule your Surgery, please contact our Specialty Schedulers at [phone]    ENT Clinic Locations Clinic Hours Telephone Number     [facility]  [address] [address]   Every other Monday 8:00-4:00       To schedule an appointment with   Dr. Richard,   please contact our   Specialty Scheduling Department at:     [phone]       [facility]  [address] [place] [address]          8:00am - 4:00pm         Urgent Care Locations Clinic Hours Telephone Numbers     [facility] Daly  [address] [place] [address]     Monday-Friday:     11:00pm - 9:00pm    Saturday-Sunday:  9:00am - 5:00pm   [phone]     [place]  [address]   [place] [address]     Monday-Friday:      5:00pm - 9:00pm     Saturday-Sunday:  9:00am - 5:00pm   [phone]       Bijan to follow up with Primary Care provider regarding elevated blood pressure.

## 2019-10-28 NOTE — PROGRESS NOTES
AUDIOLOGY REPORT:    Patient was referred from ENT by Dr. Richard for audiology evaluation. The patient has a known bilateral hearing loss and wears Oticon  in the ear hearing aids from [facility] ([employer]), though he reports that one hearing aid is not working well. He reports that his hearing has gradually gotten worse. 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 The patient denies ear pain, pressure, and tinnitus bilaterally and denies a history of ear surgery. The patient was accompanied to the appointment by his wife.     Testing:    Otoscopy:   Otoscopic exam indicates left ear partially occluded with cerumen, right ear clear     Tympanograms:    RIGHT: normal eardrum mobility     LEFT:   normal eardrum mobility    Reflexes (reported by stimulus ear):  RIGHT: Ipsilateral is absent at frequencies tested  RIGHT: Contralateral is present at elevated levels  LEFT:   Ipsilateral is present at normal levels  LEFT:   Contralateral is absent at frequencies tested    Thresholds:   Pure Tone Thresholds assessed using conventional audiometry with good reliability from 250-8000 Hz bilaterally using insert earphones and circumaural headphones     RIGHT:  moderate to severe sensorineural hearing loss    LEFT:    moderately severe to severe sensorineural hearing loss    Speech Reception Threshold:    RIGHT: 75 dB HL    LEFT:   no response at 100 dB HL; speech detection threshold at 60 dB HL  Results are poorer than expected for the pure tone average but consistent with poor word recognition     Word Recognition Score:     RIGHT: 32% at 100 dB HL using NU-6 recorded word list.    LEFT:   32% at 100 dB HL using NU-6 recorded word list.    Discussed results with the patient. The patient and his wife reported that he is no longer being seen at [facility] because he was told that they had done everything they could with the hearing aids. I counseled them that the  patient could return here for a hearing aid check, but that I was not sure if the programming is locked on the [employer] devices.    Patient was returned to ENT for follow up.     Shon Vela, CCC-A  Licensed Audiologist #[id number]  10/28/2019

## 2019-10-28 NOTE — LETTER
10/28/2019         RE: Bijan Daniel  [address] [place] [address] [address]  [address]  [facility] [address]        Dear Colleague,    Thank you for referring your patient, Bijan Daniel, to the [facility]. Please see a copy of my visit note below.    Chief Complaint   Patient presents with     Hearing Problem     hearing loss     History of Present Illness   Bijan Daniel is a 85 year old male who presents to me today for ear evaluation.  I am seeing this patient in consultation for sensorineural hearing loss at the request of the provider Dr. Randall.  The patient reports significant hearing loss for the last 20+ years.  It was gradual in onset.  The patient has noticed increased difficulty hearing certain sounds and difficulty in understanding others, especially in noisy or crowded situations.  There is no history of recent head trauma, or chronic ear disease or ear surgery.  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  He also was working with [employer]. No family history of hearing loss at a young age. The patient denies vertigo, otorrhea, otalgia, or tinnitus.    Past Medical History  Patient Active Problem List   Diagnosis     Chronic atrial fibrillation     MARI (obstructive sleep apnea)     Vitamin B12 deficiency (non anemic)     Moderate major depression (H)     Primary osteoarthritis of left shoulder     Acute mesenteric ischemia (H)     MADI (acute kidney injury) (H)     Chronic lymphocytic leukemia (H)     Osteomyelitis of arm (H)     Chronic obstructive pulmonary disease, unspecified COPD type (H)     Thrombocytopenia (H)     Current Medications     Current Outpatient Medications:      acetaminophen 500 MG CAPS, Take 1,000 mg by mouth 3 times daily, Disp: 60 capsule, Rfl:      amoxicillin (AMOXIL) 250 MG capsule, Take 1 capsule (250 mg) by mouth daily, Disp: , Rfl:      Apixaban (ELIQUIS PO), Take 5 mg by mouth 2 times daily, Disp: , Rfl:      calcium  carbonate (CALCIUM CARBONATE) 500 MG tablet, Take by mouth 2 times daily, Disp: , Rfl:      diphenhydrAMINE HCl 50 MG TABS, Take 25 mg by mouth 2 times daily as needed, Disp: 56 tablet, Rfl:      docusate sodium (COLACE) 50 MG capsule, Take 1 capsule (50 mg) by mouth daily as needed for constipation, Disp: 60 capsule, Rfl:      ipratropium (ATROVENT) 0.06 % spray, Spray 1-2 sprays into both nostrils 4 times daily as needed for rhinitis, Disp: , Rfl:      metoprolol (LOPRESSOR) 100 MG tablet, TAKE 1 TABLET ORALLY TWICE DAILY, Disp: , Rfl:      omeprazole (PRILOSEC) 20 MG CR capsule, Take 2 capsules (40 mg) by mouth daily, Disp: 30 capsule, Rfl:      order for DME, Equipment being ordered: Hospital Bed, Disp: 1 each, Rfl: 0     senna-docusate (SENOKOT-S;PERICOLACE) 8.6-50 MG per tablet, Take 4 tablets by mouth daily as needed, Disp: 100 tablet, Rfl:      THIAMINE HCL PO, Take 100 mg by mouth daily, Disp: , Rfl:      venlafaxine (EFFEXOR-XR) 37.5 MG 24 hr capsule, Take 1 capsule (37.5 mg) by mouth daily, Disp: 90 capsule, Rfl: 3    Allergies  Allergies   Allergen Reactions     Nuts Anaphylaxis       Social History   Social History     Socioeconomic History     Marital status:      Spouse name: Not on file     Number of children: Not on file     Years of education: Not on file     Highest education level: Not on file   Occupational History     Occupation: retired     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   Social Needs     Financial resource strain: Not on file     Food insecurity:     Worry: Not on file     Inability: Not on file     Transportation needs:     Medical: Not on file     Non-medical: Not on file   Tobacco Use     Smoking status: Former Smoker     Smokeless tobacco: Never Used   Substance and Sexual Activity     Alcohol use: Yes     Comment: 14 or less     Drug use: No     Sexual activity: Never   Lifestyle     Physical activity:     Days per week: Not on file     Minutes per session: Not on file      "Stress: Not on file   Relationships     Social connections:     Talks on phone: Not on file     Gets together: Not on file     Attends Christian service: Not on file     Active member of club or organization: Not on file     Attends meetings of clubs or organizations: Not on file     Relationship status: Not on file     Intimate partner violence:     Fear of current or ex partner: Not on file     Emotionally abused: Not on file     Physically abused: Not on file     Forced sexual activity: Not on file   Other Topics Concern     Parent/sibling w/ CABG, MI or angioplasty before 65F 55M? Not Asked   Social History Narrative     Not on file       Family History  No family history on file.    Review of Systems  As per HPI and PMHx, otherwise 10+ comprehensive system review is negative.    Physical Exam  BP (!) 143/89   Pulse 76   Ht 1.88 m (6' 2\")   Wt 89.8 kg (198 lb)   SpO2 99%   BMI 25.42 kg/m     GENERAL: Patient is a pleasant, cooperative 85 year old male in no acute distress.  HEAD: Normocephalic, atraumatic.  Hair and scalp are normal.  EYES: Pupils are equal, round, reactive to light and accommodation.  Extraocular movements are intact.  The sclera nonicteric without injection.  The extraocular structures are normal.  EARS: See below.  NOSE: Nares are patent.  Nasal mucosa is pink and moist.  Negative anterior rhinoscopy.  NEUROLOGIC: Cranial nerves II through XII are grossly intact.  Voice is strong.  Patient is House-Brackman I/VI bilaterally.  CARDIOVASCULAR: Extremities are warm and well-perfused.  No significant peripheral edema.  RESPIRATORY: Patient has nonlabored breathing without cough, wheeze, stridor.  PSYCHIATRIC: Patient is alert and oriented.  Mood and affect appear normal.  SKIN: Warm and dry.  No scalp, face, or neck lesions noted.    Physical Exam and Procedure    EARS: Normal shape and symmetry.  No tenderness when palpating the mastoid or tragal areas bilaterally.  The ears were then " examined under the otic binocular microscope.  Otoscopic exam on the right reveals a clear canal.  The right tympanic membrane was round, intact without evidence of effusion.  No retraction, granulation, drainage, or evidence of cholesteatoma.      Attention was turned to the left ear.  Otoscopic exam on the left reveals impacted cerumen down to the level of the tympanic membrane.  The cerumen was removed with an alligator forceps.  The left tympanic membrane was round, intact without evidence of effusion.  No retraction, granulation, drainage, or evidence of cholesteatoma.      Audiogram  The patient underwent an audiogram performed today.  My review of the audiogram shows moderate sloping to severe sensorineural hearing loss in the right ear and moderately severe sloping to severe sensorineural hearing loss in the left ear.  Pure-tone average is 55 dB on the right and 67 dB on the left.  Speech reception threshhold is 75 dB on the right and 60 dB on the left.  The patient had 32% word recognition on the right and 32% word recognition on the left.  The patient had a type A tympanogram on the right and a type A tympanogram on the left.     Assessment and Plan     ICD-10-CM    1. Sensorineural hearing loss, bilateral H90.3 AUDIOLOGY ADULT REFERRAL   2. Impacted cerumen of left ear H61.22 AUDIOLOGY ADULT REFERRAL     Remove Cerumen     It was my pleasure seeing Bijan Daniel today in clinic.  The patient presents today with hearing loss.  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 I can find no evidence of serious CNS disorders or other complicating factors that could be causing this.  We spent the remainder of today's visit on education. We discussed hearing protection in noisy environments.    The patient will follow up as necessary for worsening symptoms or changes in symptoms. I have also [facility] for new hearing aids.  We also discussed testing for cochlear implantation if the hearing aids are not helpful.      Andrew Richard MD  Department of Otolarygology-Head and Neck Surgery  [facility]      Again, thank you for allowing me to participate in the care of your patient.        Sincerely,        Andrew Richard MD

## 2019-12-15 ENCOUNTER — HEALTH MAINTENANCE LETTER (OUTPATIENT)
Age: 84
End: 2019-12-15

## 2019-12-18 ENCOUNTER — OFFICE VISIT (OUTPATIENT)
Dept: OPHTHALMOLOGY | Facility: CLINIC | Age: 84
End: 2019-12-18
Payer: MEDICARE

## 2019-12-18 DIAGNOSIS — H53.2 DIPLOPIA: Primary | ICD-10-CM

## 2019-12-18 DIAGNOSIS — H18.20 CORNEAL EDEMA OF LEFT EYE: ICD-10-CM

## 2019-12-18 PROCEDURE — 92012 INTRM OPH EXAM EST PATIENT: CPT | Performed by: OPHTHALMOLOGY

## 2019-12-18 RX ORDER — CARBOXYMETHYLCELLULOSE SODIUM 5 MG/ML
1 SOLUTION/ DROPS OPHTHALMIC 3 TIMES DAILY PRN
COMMUNITY
End: 2020-04-01

## 2019-12-18 RX ORDER — SODIUM CHLORIDE 5 %
OINTMENT (GRAM) OPHTHALMIC (EYE)
Qty: 1 TUBE | Refills: 11 | Status: SHIPPED | OUTPATIENT
Start: 2019-12-18 | End: 2020-04-01

## 2019-12-18 ASSESSMENT — SLIT LAMP EXAM - LIDS: COMMENTS: HIGH CREASE , 1+ PTOSIS

## 2019-12-18 ASSESSMENT — VISUAL ACUITY
OS_CC+: +1
CORRECTION_TYPE: GLASSES
OD_CC: 20/20
OD_CC+: -1
OS_CC: 20/50
METHOD: SNELLEN - LINEAR

## 2019-12-18 ASSESSMENT — TONOMETRY
OS_IOP_MMHG: 09
IOP_METHOD: APPLANATION
OD_IOP_MMHG: 11

## 2019-12-18 ASSESSMENT — EXTERNAL EXAM - LEFT EYE: OS_EXAM: 3+ BROW PTOSIS

## 2019-12-18 ASSESSMENT — CUP TO DISC RATIO
OD_RATIO: 0.3
OS_RATIO: 0.3

## 2019-12-18 ASSESSMENT — EXTERNAL EXAM - RIGHT EYE: OD_EXAM: 3+ BROW PTOSIS

## 2019-12-18 NOTE — LETTER
12/18/2019         RE: Bijan Daniel  [address] [place] [address] [address]  [address]  [facility] [address]        Dear Colleague,    Thank you for referring your patient, Bijan Daniel, to the [facility]. Please see a copy of my visit note below.     Current Eye Medications:  Refresh up to TID both eyes.      Subjective:  Here for IOP/dilation.  Has been since 3-19 since last visit.   Left eye has been sore off and on. Still seeing double.  In the morning he always sees double upon waking, then glasses are on and it helps.     Glasses control diplopia pretty well.     Objective:  See Ophthalmology Exam.       Assessment:  Mild corneal edema left eye.  Diplopia improved with prism change.  Stable dilated exam both eyes.      Plan:  Continue same glasses.  Use artificial tears up to 4 times daily both eyes.  (Refresh Tears, Systane Ultra/Balance, or Theratears)   Start Ciera ointment to left eye at bedtime and 2-3 times daily if necessary.  Return visit 6 months for a complete exam.  Adrian Suggs M.D.  [phone]           Again, thank you for allowing me to participate in the care of your patient.        Sincerely,        Adrian Suggs MD

## 2019-12-18 NOTE — PATIENT INSTRUCTIONS
Continue same glasses.  Use artificial tears up to 4 times daily both eyes.  (Refresh Tears, Systane Ultra/Balance, or Theratears)   Start Ciera ointment to left eye at bedtime and 2-3 times daily if necessary.  Return visit 6 months for a complete exam.  Adrian Suggs M.D.  [phone]

## 2019-12-18 NOTE — PROGRESS NOTES
Current Eye Medications:  Refresh up to TID both eyes.      Subjective:  Here for IOP/dilation.  Has been since 3-19 since last visit.   Left eye has been sore off and on. Still seeing double.  In the morning he always sees double upon waking, then glasses are on and it helps.     Glasses control diplopia pretty well.     Objective:  See Ophthalmology Exam.       Assessment:  Mild corneal edema left eye.  Diplopia improved with prism change.  Stable dilated exam both eyes.      Plan:  Continue same glasses.  Use artificial tears up to 4 times daily both eyes.  (Refresh Tears, Systane Ultra/Balance, or Theratears)   Start Ciera ointment to left eye at bedtime and 2-3 times daily if necessary.  Return visit 6 months for a complete exam.  Adrian Suggs M.D.  [phone]

## 2020-01-18 ENCOUNTER — NURSE TRIAGE (OUTPATIENT)
Dept: NURSING | Facility: CLINIC | Age: 85
End: 2020-01-18

## 2020-01-18 NOTE — TELEPHONE ENCOUNTER
Stomach camps, nausea, diarrhea since this morning and too weak to stand.  Will call 911 per guideline protocol.    Madhuri Huang RN  [facility]       Reason for Disposition    Shock suspected (e.g., cold/pale/clammy skin, too weak to stand, low BP, rapid pulse)    Protocols used: DIARRHEA-A-AH

## 2020-02-19 ENCOUNTER — OFFICE VISIT (OUTPATIENT)
Dept: FAMILY MEDICINE | Facility: CLINIC | Age: 85
End: 2020-02-19
Payer: MEDICARE

## 2020-02-19 ENCOUNTER — ANCILLARY PROCEDURE (OUTPATIENT)
Dept: GENERAL RADIOLOGY | Facility: CLINIC | Age: 85
End: 2020-02-19
Attending: INTERNAL MEDICINE
Payer: MEDICARE

## 2020-02-19 VITALS
WEIGHT: 197 LBS | BODY MASS INDEX: 26.11 KG/M2 | DIASTOLIC BLOOD PRESSURE: 87 MMHG | OXYGEN SATURATION: 99 % | SYSTOLIC BLOOD PRESSURE: 133 MMHG | HEIGHT: 73 IN | HEART RATE: 65 BPM

## 2020-02-19 DIAGNOSIS — K55.059 ACUTE MESENTERIC ISCHEMIA (H): ICD-10-CM

## 2020-02-19 DIAGNOSIS — M86.9: ICD-10-CM

## 2020-02-19 DIAGNOSIS — C91.10 CHRONIC LYMPHOCYTIC LEUKEMIA (H): ICD-10-CM

## 2020-02-19 DIAGNOSIS — J44.9 CHRONIC OBSTRUCTIVE PULMONARY DISEASE, UNSPECIFIED COPD TYPE (H): ICD-10-CM

## 2020-02-19 DIAGNOSIS — J30.0 CHRONIC VASOMOTOR RHINITIS: ICD-10-CM

## 2020-02-19 DIAGNOSIS — D69.6 THROMBOCYTOPENIA (H): ICD-10-CM

## 2020-02-19 DIAGNOSIS — N40.0 BENIGN PROSTATIC HYPERPLASIA, UNSPECIFIED WHETHER LOWER URINARY TRACT SYMPTOMS PRESENT: Primary | ICD-10-CM

## 2020-02-19 DIAGNOSIS — F32.0 MILD MAJOR DEPRESSION (H): ICD-10-CM

## 2020-02-19 PROCEDURE — 99214 OFFICE O/P EST MOD 30 MIN: CPT | Performed by: INTERNAL MEDICINE

## 2020-02-19 PROCEDURE — 71046 X-RAY EXAM CHEST 2 VIEWS: CPT

## 2020-02-19 RX ORDER — TAMSULOSIN HYDROCHLORIDE 0.4 MG/1
0.4 CAPSULE ORAL DAILY
Qty: 31 CAPSULE | Refills: 3 | Status: SHIPPED | OUTPATIENT
Start: 2020-02-19 | End: 2020-04-01

## 2020-02-19 RX ORDER — POLYETHYLENE GLYCOL 3350 17 G/17G
1 POWDER, FOR SOLUTION ORAL EVERY OTHER DAY
COMMUNITY

## 2020-02-19 RX ORDER — IPRATROPIUM BROMIDE 42 UG/1
1-2 SPRAY, METERED NASAL 4 TIMES DAILY PRN
Qty: 1 BOX | Refills: 3 | Status: SHIPPED | OUTPATIENT
Start: 2020-02-19 | End: 2020-04-01

## 2020-02-19 ASSESSMENT — MIFFLIN-ST. JEOR: SCORE: 1632.47

## 2020-02-19 NOTE — PROGRESS NOTES
Subjective     Bijan Daniel is a 85 year old male who presents to clinic today for the following health issues:    HPI   ENT Symptoms             Symptoms: cc Present Absent Comment   Fever/Chills   x    Fatigue   x    Muscle Aches   x    Eye Irritation   x    Sneezing   x    Nasal Migue/Drg  x     Sinus Pressure/Pain   x    Loss of smell   x    Dental pain   x    Sore Throat  x  Throat issues   Swollen Glands   x    Ear Pain/Fullness   x    Cough  x     Wheeze   x    Chest Pain   x    Shortness of breath   x    Rash   x    Other   x      Symptom duration:  over 2 weeks    Symptom severity:  moderate   Treatments tried:  Atrovent   Contacts:  none     Stream is good and not halting. But has frequent night time wakenings            Patient Active Problem List   Diagnosis     Chronic atrial fibrillation     MARI (obstructive sleep apnea)     Vitamin B12 deficiency (non anemic)     Moderate major depression (H)     Primary osteoarthritis of left shoulder     Acute mesenteric ischemia (H)     MADI (acute kidney injury) (H)     Chronic lymphocytic leukemia (H)     Osteomyelitis of arm (H)     Chronic obstructive pulmonary disease, unspecified COPD type (H)     Thrombocytopenia (H)     Past Surgical History:   Procedure Laterality Date     APPENDECTOMY Right      AS TOTAL HIP ARTHROPLASTY Right 2008     AS TOTAL HIP ARTHROPLASTY Left 2009     C ANESTH,SHOULDER REPLACEMENT       CATARACT IOL, RT/LT Bilateral 2009     removal shoulder arthroplasty         Social History     Tobacco Use     Smoking status: Former Smoker     Last attempt to quit: 1960     Years since quittin.1     Smokeless tobacco: Never Used   Substance Use Topics     Alcohol use: Yes     Comment: 14 or less     No family history on file.      Current Outpatient Medications   Medication Sig Dispense Refill     acetaminophen 500 MG CAPS Take 1,000 mg by mouth 3 times daily 60 capsule      amoxicillin (AMOXIL) 250 MG capsule Take 1 capsule (250 mg) by  mouth daily       Apixaban (ELIQUIS PO) Take 5 mg by mouth 2 times daily       calcium carbonate (CALCIUM CARBONATE) 500 MG tablet Take by mouth 2 times daily       carboxymethylcellulose PF (REFRESH PLUS) 0.5 % ophthalmic solution Place 1 drop into both eyes 3 times daily as needed       diphenhydrAMINE HCl 50 MG TABS Take 25 mg by mouth 2 times daily as needed 56 tablet      ipratropium 0.06 % NA nasal spray Spray 1-2 sprays into both nostrils 4 times daily as needed for rhinitis 1 Box 3     metoprolol (LOPRESSOR) 100 MG tablet TAKE 1 TABLET ORALLY TWICE DAILY       omeprazole (PRILOSEC) 20 MG CR capsule Take 2 capsules (40 mg) by mouth daily 30 capsule      order for DME Equipment being ordered: Hospital Bed 1 each 0     polyethylene glycol PO packet Take 1 packet by mouth daily       sodium chloride (KIRBY 128) 5 % ophthalmic ointment Apply small squirt to left eye at bedtime and twice daily if necessary. 1 Tube 11     tamsulosin (FLOMAX) 0.4 MG PO capsule Take 1 capsule (0.4 mg) by mouth daily 31 capsule 3     THIAMINE HCL PO Take 100 mg by mouth daily       venlafaxine (EFFEXOR-XR) 37.5 MG 24 hr capsule Take 1 capsule (37.5 mg) by mouth daily 90 capsule 3     Allergies   Allergen Reactions     Nuts Anaphylaxis     Other reaction(s): Congestion of throat (finding)     Peanut-Derived Anaphylaxis     Recent Labs   Lab Test 05/23/19  1155 09/06/18  1250  06/27/18  1420   LDL  --   --   --  90   HDL  --   --   --  57   TRIG  --   --   --  95   CR 0.88 0.90   < > 1.03   GFRESTIMATED 78 81   < > 69   GFRESTBLACK >90 >90   < > 83   POTASSIUM 4.3 4.5   < > 4.7   TSH 4.03*  --   --   --     < > = values in this interval not displayed.      BP Readings from Last 3 Encounters:   02/19/20 133/87   10/28/19 (!) 143/89   07/17/19 128/87    Wt Readings from Last 3 Encounters:   02/19/20 89.4 kg (197 lb)   10/28/19 89.8 kg (198 lb)   07/17/19 86.6 kg (191 lb)                      Reviewed and updated as needed this visit by  "Provider         Review of Systems   ROS COMP: Constitutional, HEENT, cardiovascular, pulmonary, gi and gu systems are negative, except as otherwise noted.      Objective    /87 (BP Location: Left arm, Patient Position: Chair, Cuff Size: Adult Regular)   Pulse 65   Ht 1.854 m (6' 1\")   Wt 89.4 kg (197 lb)   SpO2 99%   BMI 25.99 kg/m    Body mass index is 25.99 kg/m .  Physical Exam   GENERAL: healthy, alert and no distress  EYES: Eyes grossly normal to inspection, PERRL and conjunctivae and sclerae normal  HENT: ear canals and TM's normal, nose and mouth without ulcers or lesions  NECK: no adenopathy, no asymmetry, masses, or scars and thyroid normal to palpation  RESP: lungs clear to auscultation - no rales, rhonchi or wheezes  CV: regular rate and rhythm, normal S1 S2, no S3 or S4, no murmur, click or rub, no peripheral edema and peripheral pulses strong  ABDOMEN: soft, nontender, no hepatosplenomegaly, no masses and bowel sounds normal  MS: no gross musculoskeletal defects noted, no edema  SKIN: no suspicious lesions or rashes  NEURO: Normal strength and tone, mentation intact and speech normal  BACK: no CVA tenderness, no paralumbar tenderness  PSYCH: mentation appears normal, affect normal/bright  LYMPH: no cervical, supraclavicular, axillary, or inguinal adenopathy    Diagnostic Test Results:  Labs reviewed in Epic  Results for orders placed or performed in visit on 02/19/20   XR Chest 2 Views     Status: None    Narrative    CHEST TWO VIEWS 2/19/2020 12:27 PM     HISTORY: Chronic vasomotor rhinitis    COMPARISON: 9/2/2011       Impression    IMPRESSION: No evidence for acute focal alveolar infiltrate or  consolidation. Underlying diffuse interstitial fibrotic changes  throughout both lungs. Mild elevation left hemidiaphragm. No pleural  fluid. Minimal cardiac enlargement with normal pulmonary vascularity.  Tortuous and calcified thoracic aorta. Air-filled esophageal hiatal  hernia. Degenerative " "hypertrophic changes in the spine. Resection of  the right humeral head with widening of the glenoid cavity and  hypertrophic changes, chronic in appearance.    CURT L BEHRNS, MD           Assessment & Plan       ICD-10-CM    1. Benign prostatic hyperplasia, unspecified whether lower urinary tract symptoms present N40.0 tamsulosin (FLOMAX) 0.4 MG PO capsule   2. Chronic vasomotor rhinitis J30.0 ipratropium 0.06 % NA nasal spray     XR Chest 2 Views   3. Chronic lymphocytic leukemia (H) C91.10    4. Osteomyelitis of arm (H) M86.9    5. Acute mesenteric ischemia (H) K55.059    6. Mild major depression (H) F32.0    7. Chronic obstructive pulmonary disease, unspecified COPD type (H) J44.9    8. Thrombocytopenia (H) D69.6         BMI:   Estimated body mass index is 25.99 kg/m  as calculated from the following:    Height as of this encounter: 1.854 m (6' 1\").    Weight as of this encounter: 89.4 kg (197 lb).   Weight management plan: Discussed healthy diet and exercise guidelines        Regular exercise    No follow-ups on file.    Oc Randall MD  [facility]        "

## 2020-02-24 ENCOUNTER — DOCUMENTATION ONLY (OUTPATIENT)
Dept: OTHER | Facility: CLINIC | Age: 85
End: 2020-02-24

## 2020-03-05 ENCOUNTER — DOCUMENTATION ONLY (OUTPATIENT)
Dept: OTHER | Facility: CLINIC | Age: 85
End: 2020-03-05

## 2020-03-24 ENCOUNTER — NURSE TRIAGE (OUTPATIENT)
Dept: FAMILY MEDICINE | Facility: CLINIC | Age: 85
End: 2020-03-24

## 2020-03-24 NOTE — TELEPHONE ENCOUNTER
Attempted to call both wife and Pt's number. Both went to .  Unsure at this time what symptoms patient is having.  Left detailed message to call back to the clinic. Given RN line.    I will attempt to call back prior to clinic closing at 7pm.      Brandi Dennis, RN

## 2020-03-24 NOTE — TELEPHONE ENCOUNTER
Reason for call:  Patient reporting a symptom    Symptom or request: Headache/ vomiting/ weakness     Duration (how long have symptoms been present): today     Have you been treated for this before? No    Additional comments: Patient's wife called in concerned that the patient had a stroke this morning. He was fine at breakfast then got a headache and vomited and has been to weak to get up out of his chair all day. Patient's wife would like a call back to discuss these symptoms.    Phone Number patient can be reached at:  [phone]    Best Time:  Any     Can we leave a detailed message on this number:  YES    Call taken on 3/24/2020 at 6:27 PM by Giuliana Tucker

## 2020-03-24 NOTE — TELEPHONE ENCOUNTER
"DISPOSITION :  CALL 911, offered to call but Patient's wife will call 911, she prefers that they go to [facility].      Reason for Disposition    Difficult to awaken or acting confused (e.g., disoriented, slurred speech)    Answer Assessment - Initial Assessment Questions  1. SYMPTOM: \"What is the main symptom you are concerned about?\" (e.g., weakness, numbness)      Weakness, slurred speech, headache  2. ONSET: \"When did this start?\" (minutes, hours, days; while sleeping)      10:30 AM  3. LAST NORMAL: \"When was the last time you were normal (no symptoms)?\"      At breakfast  4. PATTERN \"Does this come and go, or has it been constant since it started?\"  \"Is it present now?\"     Consistent since 10:30 this morning  5. CARDIAC SYMPTOMS: \"Have you had any of the following symptoms: chest pain, difficulty breathing, palpitations?\"     reproted Has Afib but no chest pain or difficulty breathing  6. NEUROLOGIC SYMPTOMS: \"Have you had any of the following symptoms: headache, dizziness, vision loss, double vision, changes in speech, unsteady on your feet?\"      Headache, dizzy,and weakness- cannot move   7. OTHER SYMPTOMS: \"Do you have any other symptoms?\"      had bad headache around 10am, wife gave tylenol.  He fell asleep, woke up cold,and hasn't been able to get out of chair since.  Patient wears a brief that hasn't been changed since 10:30am    Protocols used: NEUROLOGIC DEFICIT-A-OH      Brandi Dennis RN  "

## 2020-03-31 ENCOUNTER — VIRTUAL VISIT (OUTPATIENT)
Dept: GERIATRICS | Facility: CLINIC | Age: 85
End: 2020-03-31
Payer: MEDICARE

## 2020-03-31 VITALS
SYSTOLIC BLOOD PRESSURE: 131 MMHG | RESPIRATION RATE: 18 BRPM | TEMPERATURE: 97.1 F | BODY MASS INDEX: 25.17 KG/M2 | WEIGHT: 190.8 LBS | OXYGEN SATURATION: 96 % | DIASTOLIC BLOOD PRESSURE: 97 MMHG | HEART RATE: 72 BPM

## 2020-03-31 DIAGNOSIS — M86.60 CHRONIC OSTEOMYELITIS (H): ICD-10-CM

## 2020-03-31 DIAGNOSIS — A49.8 KLEBSIELLA PNEUMONIAE INFECTION: Primary | ICD-10-CM

## 2020-03-31 DIAGNOSIS — I48.91 ATRIAL FIBRILLATION, UNSPECIFIED TYPE (H): ICD-10-CM

## 2020-03-31 DIAGNOSIS — F32.A DEPRESSION, UNSPECIFIED DEPRESSION TYPE: ICD-10-CM

## 2020-03-31 DIAGNOSIS — F32.0 MILD MAJOR DEPRESSION (H): ICD-10-CM

## 2020-03-31 DIAGNOSIS — J44.9 CHRONIC OBSTRUCTIVE PULMONARY DISEASE, UNSPECIFIED COPD TYPE (H): ICD-10-CM

## 2020-03-31 DIAGNOSIS — G47.33 OSA (OBSTRUCTIVE SLEEP APNEA): ICD-10-CM

## 2020-03-31 DIAGNOSIS — C91.10 CLL (CHRONIC LYMPHOCYTIC LEUKEMIA) (H): ICD-10-CM

## 2020-03-31 DIAGNOSIS — R53.81 PHYSICAL DECONDITIONING: ICD-10-CM

## 2020-03-31 DIAGNOSIS — K59.00 CONSTIPATION, UNSPECIFIED CONSTIPATION TYPE: ICD-10-CM

## 2020-03-31 DIAGNOSIS — R41.89 COGNITIVE IMPAIRMENT: ICD-10-CM

## 2020-03-31 DIAGNOSIS — K21.9 GASTROESOPHAGEAL REFLUX DISEASE, ESOPHAGITIS PRESENCE NOT SPECIFIED: ICD-10-CM

## 2020-03-31 PROCEDURE — 99207 ZZC CDG-MDM COMPONENT: MEETS LOW - DOWN CODED: CPT | Performed by: NURSE PRACTITIONER

## 2020-03-31 PROCEDURE — 99309 SBSQ NF CARE MODERATE MDM 30: CPT | Mod: GT | Performed by: NURSE PRACTITIONER

## 2020-03-31 NOTE — PROGRESS NOTES
"E-visit [facility]  Bijan Daniel is being evaluated via a billable video visit due to the restrictions of the Covid-19 pandemic.   The patient has been notified of following:  \"This video visit will be conducted via a call between you and your provider. We have found that certain health care needs can be provided without the need for an in-person physical exam.  This service lets us provide the care you need with a video conversation. If during the course of the call the provider feels a video visit is not appropriate, you will not be charged for this service.\"   The provider has received verbal consent for a Video Visit from the patient and or first contact? Yes  Patient/facility staff would like the video invitation sent by: N/A   Video Start Time: 0842  Place of Location at the time of visit: [facility]     PRIMARY CARE PROVIDER AND CLINIC:  Oc Randall MD, [address] [place] [address] / [facility] [id number]  Chief Complaint   Patient presents with     Hospital F/U     [facility] Medical Record Number:  [id number]  Bijan Daniel  is a 85 year old  (8/12/1934), admitted to the above facility from  [facility] . Hospital stay 3/24/20 through 3/27/20..  Admitted to this facility for  rehab, medical management and nursing care.  HPI:    HPI information obtained from: facility chart records, facility staff, patient report, [facility] chart review and Care Everywhere [facility] chart review.     Brief Summary of Hospital Course:     This is an 85-year-old male, with a past medical history significant for atrial fibrillation on Apixaban, coronary artery disease, CLL, GERD, COPD, MARI on CPAP and depression, who was admitted to [facility] for weakness and headache. A head and neck carotid angiogram on 3/24/20 revealed \"1. Short segment 35% stenosis at the origin of the right ICA 2. Short segment 40% stenosis at the origin of the left ICA\". A head CT revealed \"Presumed chronic " "microvascular ischemic changes\" with no acute findings. Labs revealed WBC 17.4 and Platelet Count 116. A urine culture revealed > 100,000 CFU/ml Klebsiella pneumoniae. Antibiotics were initiated. Received IV fluids for orthostatic hypotension which was thought to be secondary to infection and poor oral intake. A TCU stay was recommended for ongoing physical rehabilitation.     Updates on Status Since Skilled nursing Admission:     Is less weak than went he went into the hospital. Feels like he's not doing a lot with therapy. Used a walker prior to hospitalization. Goal is to get stronger and return home with wife.      CODE STATUS/ADVANCE DIRECTIVES DISCUSSION:   DNR - Requests to keep as it was in the hospital. Feels it's a difficult question. Wants to review with wife.  Patient's living condition: lives with spouse  ALLERGIES: Nuts and Peanut-derived  PAST MEDICAL HISTORY:  has a past medical history of A-fib (H) and Arthritis. He also has no past medical history of Amblyopia, Complication of anesthesia, Diabetes (H), Diabetic retinopathy associated with diabetes mellitus due to underlying condition (H), Glaucoma (increased eye pressure), Hypertension, Macular degeneration, Malignant hyperthermia, Nonsenile cataract, Retinal detachment, Strabismus, or Uveitis.  PAST SURGICAL HISTORY:   has a past surgical history that includes ANESTH,SHOULDER REPLACEMENT; TOTAL HIP ARTHROPLASTY (Right, 2008); TOTAL HIP ARTHROPLASTY (Left, 2009); removal shoulder arthroplasty; appendectomy (Right); and cataract iol, rt/lt (Bilateral, 2009).  FAMILY HISTORY: family history is not on file.  SOCIAL HISTORY:   reports that he quit smoking about 60 years ago. He has never used smokeless tobacco. He reports current alcohol use. He reports that he does not use drugs.  Current Outpatient Medications   Medication Sig Dispense Refill     acetaminophen 500 MG CAPS Take 1,000 mg by mouth daily as needed  60 capsule      amoxicillin (AMOXIL) " 250 MG capsule Take 1 capsule (250 mg) by mouth daily       Apixaban (ELIQUIS PO) Take 5 mg by mouth 2 times daily       calcium carbonate (CALCIUM CARBONATE) 500 MG tablet Take 2 tablets by mouth daily        metoprolol (LOPRESSOR) 100 MG tablet TAKE 1 TABLET ORALLY TWICE DAILY       omeprazole (PRILOSEC) 20 MG CR capsule Take 2 capsules (40 mg) by mouth daily 30 capsule      polyethylene glycol PO packet Take 1 packet by mouth every other day        venlafaxine (EFFEXOR-XR) 37.5 MG 24 hr capsule Take 2 capsules (75 mg) by mouth daily       vitamin B-12 (CYANOCOBALAMIN) 1000 MCG tablet Take 1,000 mcg by mouth daily       order for DME Equipment being ordered: Hospital Bed 1 each 0     ROS: 4 point ROS including Respiratory, CV, GI and , other than that noted in the HPI,  is negative    Vitals:BP (!) 131/97   Pulse 72   Temp 97.1  F (36.2  C)   Resp 18   Wt 86.5 kg (190 lb 12.8 oz)   SpO2 96%   BMI 25.17 kg/m     E-Visit Exam done given COVID-19 precautions:  GENERAL APPEARANCE:  Alert, in no distress  ENT:  Mouth and posterior oropharynx normal, moist mucous membranes  EYES:  EOM, conjunctivae, lids, pupils and irises normal  RESP:  no respiratory distress  SKIN:  Inspection of skin and subcutaneous tissue baseline, Palpation of skin and subcutaneous tissue baseline  PSYCH:  affect and mood normal    Lab/Diagnostic data:  Labs done in SNF are in [facility]. Please refer to them using [facility]/Care Everywhere.    ASSESSMENT/PLAN:  Klebsiella Pneumoniae Urinary Tract Infection. Continue Cefdinir as ordered until 3/31/20.     Chronic Atrial Fibrillation. Monitor heart rate daily. Continue Metoprolol and Apixaban as ordered.    Chronic Lymphocytic Leukemia. Previous Oncology notes indicate observation from 2012. WBC 12.1 and Hemoglobin 12.8 on 3/25/20.     Thrombocytopenia. Platelet Count low 100s during hospital stay. Also present in [facility] records in 2018. Monitor periodically to ensure stability.      Gastroesophageal Reflux Disease. Continue Omeprazole as ordered.    Chronic Obstructive Pulmonary Disease. Requires no medications.    Constipation. Continue Miralax as ordered.    Depression. Continue Venlafaxine as ordered.    Obstructive Sleep Apnea. Continue CPAP at home settings.     Cognitive Impairment. CPT 4.7/5.6 during previous TCU stay 7/15/18 through 8/21/18.    Chronic Osteomyelitis of Right Shoulder S/P Right Total Shoulder Arthroplasty in 1993. Chronic issue with multiple surgeries. Will re-initiate Amoxicillin 250 mg PO daily as ordered PTA.     Physical Deconditioning. Secondary to recent hospitalization and co-morbidities. Physical and Occupational Therapy ordered.     Electronically signed by:  HOLLI Alexandra CNP    Video-Visit Details  Type of service:  Video Visit  Video End Time (time video stopped): 0847  Distant Location (provider location):  [facility]

## 2020-03-31 NOTE — LETTER
"    3/31/2020        RE: Bijan Daniel  [address] [place] [address] [address]  [phone]  [facility] [address]        E-visit [facility]  Bijan Daniel is being evaluated via a billable video visit due to the restrictions of the Covid-19 pandemic.   The patient has been notified of following:  \"This video visit will be conducted via a call between you and your provider. We have found that certain health care needs can be provided without the need for an in-person physical exam.  This service lets us provide the care you need with a video conversation. If during the course of the call the provider feels a video visit is not appropriate, you will not be charged for this service.\"   The provider has received verbal consent for a Video Visit from the patient and or first contact? Yes  Patient/facility staff would like the video invitation sent by: N/A   Video Start Time: 0842  Place of Location at the time of visit: [facility]     PRIMARY CARE PROVIDER AND CLINIC:  Oc Randall MD, [address] [facility] / [facility] [address]  Chief Complaint   Patient presents with     Hospital F/U     [facility] Medical Record Number:  [id number]  Bijan Daniel  is a 85 year old  (8/12/1934), admitted to the above facility from  [facility] . Hospital stay 3/24/20 through 3/27/20..  Admitted to this facility for  rehab, medical management and nursing care.  HPI:    HPI information obtained from: facility chart records, facility staff, patient report, [facility] chart review and Care Everywhere [facility] chart review.     Brief Summary of Hospital Course:     This is an 85-year-old male, with a past medical history significant for atrial fibrillation on Apixaban, coronary artery disease, CLL, GERD, COPD, MARI on CPAP and depression, who was admitted to [facility] for weakness and headache. Labs revealed WBC 17.4 and Platelet Count 116. A urine culture revealed > 100,000 CFU/ml Klebsiella pneumoniae. " Antibiotics were initiated. Received IV fluids for orthostatic hypotension which was thought to be secondary to infection and poor oral intake. A TCU stay was recommended for ongoing physical rehabilitation.     Updates on Status Since Skilled nursing Admission:     Is less weak than went he went into the hospital. Feels like he's not doing a lot with therapy. Used a walker prior to hospitalization. Goal is to get stronger and return home with wife.      CODE STATUS/ADVANCE DIRECTIVES DISCUSSION:   DNR   Patient's living condition: lives with spouse  ALLERGIES: Nuts and Peanut-derived  PAST MEDICAL HISTORY:  has a past medical history of A-fib (H) and Arthritis. He also has no past medical history of Amblyopia, Complication of anesthesia, Diabetes (H), Diabetic retinopathy associated with diabetes mellitus due to underlying condition (H), Glaucoma (increased eye pressure), Hypertension, Macular degeneration, Malignant hyperthermia, Nonsenile cataract, Retinal detachment, Strabismus, or Uveitis.  PAST SURGICAL HISTORY:   has a past surgical history that includes ANESTH,SHOULDER REPLACEMENT; TOTAL HIP ARTHROPLASTY (Right, 2008); TOTAL HIP ARTHROPLASTY (Left, 2009); removal shoulder arthroplasty; appendectomy (Right); and cataract iol, rt/lt (Bilateral, 2009).  FAMILY HISTORY: family history is not on file.  SOCIAL HISTORY:   reports that he quit smoking about 60 years ago. He has never used smokeless tobacco. He reports current alcohol use. He reports that he does not use drugs.  Current Outpatient Medications   Medication Sig Dispense Refill     acetaminophen 500 MG CAPS Take 1,000 mg by mouth daily as needed  60 capsule      amoxicillin (AMOXIL) 250 MG capsule Take 1 capsule (250 mg) by mouth daily       Apixaban (ELIQUIS PO) Take 5 mg by mouth 2 times daily       calcium carbonate (CALCIUM CARBONATE) 500 MG tablet Take 2 tablets by mouth daily        metoprolol (LOPRESSOR) 100 MG tablet TAKE 1 TABLET ORALLY TWICE  DAILY       omeprazole (PRILOSEC) 20 MG CR capsule Take 2 capsules (40 mg) by mouth daily 30 capsule      polyethylene glycol PO packet Take 1 packet by mouth every other day        venlafaxine (EFFEXOR-XR) 37.5 MG 24 hr capsule Take 2 capsules (75 mg) by mouth daily       vitamin B-12 (CYANOCOBALAMIN) 1000 MCG tablet Take 1,000 mcg by mouth daily       order for DME Equipment being ordered: Hospital Bed 1 each 0     ROS: 4 point ROS including Respiratory, CV, GI and , other than that noted in the HPI,  is negative    Vitals:BP (!) 131/97   Pulse 72   Temp 97.1  F (36.2  C)   Resp 18   Wt 86.5 kg (190 lb 12.8 oz)   SpO2 96%   BMI 25.17 kg/m     E-Visit Exam done given COVID-19 precautions:  GENERAL APPEARANCE:  Alert, in no distress  ENT:  Mouth and posterior oropharynx normal, moist mucous membranes  EYES:  EOM, conjunctivae, lids, pupils and irises normal  RESP:  no respiratory distress  SKIN:  Inspection of skin and subcutaneous tissue baseline, Palpation of skin and subcutaneous tissue baseline  PSYCH:  affect and mood normal    Lab/Diagnostic data:  Labs done in SNF are in [facility]. Please refer to them using [facility]/Care Everywhere.    ASSESSMENT/PLAN:  Klebsiella Pneumoniae Urinary Tract Infection. Continue Cefdinir as ordered until 3/31/20.     Chronic Atrial Fibrillation. Monitor heart rate daily. Continue Metoprolol and Apixaban as ordered.    Chronic Lymphocytic Leukemia. Previous Oncology notes indicate observation from 2012. WBC 12.1 and Hemoglobin 12.8 on 3/25/20.     Thrombocytopenia. Platelet Count low 100s during hospital stay. Also present in [facility] records in 2018. Monitor periodically to ensure stability.     Gastroesophageal Reflux Disease. Continue Omeprazole as ordered.    Chronic Obstructive Pulmonary Disease. Requires no medications.    Constipation. Continue Miralax as ordered.    Depression. Continue Venlafaxine as ordered.    Obstructive Sleep Apnea. Continue CPAP at home  settings.     Cognitive Impairment. CPT 4.7/5.6 during previous TCU stay 7/15/18 through 8/21/18.    Chronic Osteomyelitis of Right Shoulder S/P Right Total Shoulder Arthroplasty in 1993. Chronic issue with multiple surgeries. Will re-initiate Amoxicillin 250 mg PO daily as ordered PTA.     Physical Deconditioning. Secondary to recent hospitalization and co-morbidities. Physical and Occupational Therapy ordered.     Electronically signed by:  HOLLI Alexandra CNP    Video-Visit Details  Type of service:  Video Visit  Video End Time (time video stopped): 0847  Distant Location (provider location):  [facility]                 Sincerely,        OHLLI Alexandra CNP

## 2020-04-01 RX ORDER — VENLAFAXINE HYDROCHLORIDE 37.5 MG/1
75 CAPSULE, EXTENDED RELEASE ORAL DAILY
Start: 2020-04-01

## 2020-04-07 ENCOUNTER — VIRTUAL VISIT (OUTPATIENT)
Dept: GERIATRICS | Facility: CLINIC | Age: 85
End: 2020-04-07
Payer: MEDICARE

## 2020-04-07 VITALS
TEMPERATURE: 97.2 F | DIASTOLIC BLOOD PRESSURE: 61 MMHG | WEIGHT: 186.3 LBS | OXYGEN SATURATION: 97 % | BODY MASS INDEX: 24.58 KG/M2 | SYSTOLIC BLOOD PRESSURE: 117 MMHG | HEART RATE: 78 BPM | RESPIRATION RATE: 16 BRPM

## 2020-04-07 DIAGNOSIS — R35.0 BENIGN PROSTATIC HYPERPLASIA WITH URINARY FREQUENCY: Primary | ICD-10-CM

## 2020-04-07 DIAGNOSIS — R53.81 PHYSICAL DECONDITIONING: ICD-10-CM

## 2020-04-07 DIAGNOSIS — I48.20 CHRONIC ATRIAL FIBRILLATION (H): ICD-10-CM

## 2020-04-07 DIAGNOSIS — N40.1 BENIGN PROSTATIC HYPERPLASIA WITH URINARY FREQUENCY: Primary | ICD-10-CM

## 2020-04-07 PROCEDURE — 99309 SBSQ NF CARE MODERATE MDM 30: CPT | Mod: GT | Performed by: NURSE PRACTITIONER

## 2020-04-07 RX ORDER — TAMSULOSIN HYDROCHLORIDE 0.4 MG/1
0.4 CAPSULE ORAL DAILY
COMMUNITY
End: 2020-08-07

## 2020-04-07 NOTE — LETTER
"    4/7/2020        RE: Bijan Daniel  [address] [place] [address] [address]  [address]  [facility] [address]        [facility]   Bijan Daniel is being evaluated via a billable video visit due to the restrictions of the Covid-19 pandemic.   The patient has been notified of following: \"This video visit will be conducted via a call between you and your provider. We have found that certain health care needs can be provided without the need for an in-person physical exam.  This service lets us provide the care you need with a video conversation. If during the course of the call the provider feels a video visit is not appropriate, you will not be charged for this service.\"   The provider has received verbal consent for a Video Visit from the patient or first contact? No  Patient  or facility staff would like the video invitation sent by: N/A   Video Start Time: 0831  [facility] Medical Record Number:  [id number]  Place of Location at the time of visit: [facility]   Chief Complaint   Patient presents with     Nursing Home Acute     HPI:  Bijan Daniel  is a 85 year old (8/12/1934), who is being seen today for a visit.  HPI information obtained from: facility chart records, facility staff, patient report and [facility] Epic chart review. Today's concern is:    Benign Prostatic Hypertrophy. Complains of having to go to urinate \"right away\". Can't always get to the toilet in time. Is now wearing a brief. Initially started about a month ago. Does not feel like he felt when he had a UTI. Denies burning or pain with urination. Per review of documentation, was started on Tamsulosin by PCP for BPH on 2/19/20 due to nocturia and urine stream not stopping.    Chronic Atrial Fibrillation. Upon review of documentation over the past 7 days, heart rate 62-88.     Physical Deconditioning. Upon review of documentation from 4/1/20, ambulating 40 feet with FWW. Sit-to-stand transfers with maximum cueing for " technique. Moderate assistance with upper extremities. Maximum assistance for lower extremities. Moderate assistance for bed mobility and transfers. SLUMS Score 23/30.     Past Medical and Surgical History reviewed in Epic today.  MEDICATIONS:    Current Outpatient Medications   Medication Sig Dispense Refill     acetaminophen 500 MG CAPS Take 1,000 mg by mouth daily as needed  60 capsule      amoxicillin (AMOXIL) 250 MG capsule Take 1 capsule (250 mg) by mouth daily       Apixaban (ELIQUIS PO) Take 5 mg by mouth 2 times daily       calcium carbonate (CALCIUM CARBONATE) 500 MG tablet Take 2 tablets by mouth daily        metoprolol (LOPRESSOR) 100 MG tablet TAKE 1 TABLET ORALLY TWICE DAILY       omeprazole (PRILOSEC) 20 MG CR capsule Take 2 capsules (40 mg) by mouth daily 30 capsule      order for DME Equipment being ordered: Hospital Bed 1 each 0     polyethylene glycol PO packet Take 1 packet by mouth every other day        venlafaxine (EFFEXOR-XR) 37.5 MG 24 hr capsule Take 2 capsules (75 mg) by mouth daily       vitamin B-12 (CYANOCOBALAMIN) 1000 MCG tablet Take 1,000 mcg by mouth daily       REVIEW OF SYSTEMS: 4 point ROS including Respiratory, CV, GI and , other than that noted in the HPI,  is negative  Objective: /61   Pulse 78   Temp 97.2  F (36.2  C)   Resp 16   Wt 84.5 kg (186 lb 4.8 oz)   SpO2 97%   BMI 24.58 kg/m    Limited visit exam done given COVID-19 precautions.   GENERAL APPEARANCE:  Alert, in no distress  ENT:  Mouth and posterior oropharynx normal, moist mucous membranes  EYES:  EOM, conjunctivae, lids, pupils and irises normal  RESP:  no respiratory distress  PSYCH:  oriented to person and place  Labs:   Labs done in SNF are in [facility]. Please refer to them using [facility]/Care Everywhere.    ASSESSMENT/PLAN:  Benign Prostatic Hyperplasia. VSS. Patient reports issues with urination do not feel like a UTI. Due to complaints of urinary frequency with incontinence, will re-initiate  Tamsulosin 0.4 mg PO daily which was started by PCP on 2/19/20, but not included in hospital discharge medications.     Chronic Atrial Fibrillation. Heart rate WNL. Monitor daily. Continue Metoprolol and Apixaban as ordered.    Physical Deconditioning. Secondary to recent hospitalization for UTI and co-morbidities. Physical and Occupational Therapy ordered.      Electronically signed by:  HOLLI Alexandra CNP     Video-Visit Details  Type of service:  Video Visit  Video End Time (time video stopped): 0838  Distant Location (provider location):  [facility]             Sincerely,        HOLLI Alexandra CNP

## 2020-04-07 NOTE — PROGRESS NOTES
"[facility]   Bijan Daniel is being evaluated via a billable video visit due to the restrictions of the Covid-19 pandemic.   The patient has been notified of following: \"This video visit will be conducted via a call between you and your provider. We have found that certain health care needs can be provided without the need for an in-person physical exam.  This service lets us provide the care you need with a video conversation. If during the course of the call the provider feels a video visit is not appropriate, you will not be charged for this service.\"   The provider has received verbal consent for a Video Visit from the patient or first contact? No  Patient  or facility staff would like the video invitation sent by: N/A   Video Start Time: 0831  [place] Medical Record Number:  [id number]  Place of Location at the time of visit: [facility]   Chief Complaint   Patient presents with     Nursing Home Acute     HPI:  Bijan Daniel  is a 85 year old (8/12/1934), who is being seen today for a visit.  HPI information obtained from: facility chart records, facility staff, patient report and [facility] chart review. Today's concern is:    Benign Prostatic Hypertrophy. Complains of having to go to urinate \"right away\". Can't always get to the toilet in time. Is now wearing a brief. Initially started about a month ago. Does not feel like he felt when he had a UTI. Denies burning or pain with urination. Per review of documentation, was started on Tamsulosin by PCP for BPH on 2/19/20 due to nocturia and urine stream not stopping.    Chronic Atrial Fibrillation. Upon review of documentation over the past 7 days, heart rate 62-88.     Physical Deconditioning. Upon review of documentation from 4/1/20, ambulating 40 feet with FWW. Sit-to-stand transfers with maximum cueing for technique. Moderate assistance with upper extremities. Maximum assistance for lower extremities. Moderate assistance " for bed mobility and transfers. SLUMS Score 23/30.     Past Medical and Surgical History reviewed in Epic today.  MEDICATIONS:    Current Outpatient Medications   Medication Sig Dispense Refill     acetaminophen 500 MG CAPS Take 1,000 mg by mouth daily as needed  60 capsule      amoxicillin (AMOXIL) 250 MG capsule Take 1 capsule (250 mg) by mouth daily       Apixaban (ELIQUIS PO) Take 5 mg by mouth 2 times daily       calcium carbonate (CALCIUM CARBONATE) 500 MG tablet Take 2 tablets by mouth daily        metoprolol (LOPRESSOR) 100 MG tablet TAKE 1 TABLET ORALLY TWICE DAILY       omeprazole (PRILOSEC) 20 MG CR capsule Take 2 capsules (40 mg) by mouth daily 30 capsule      order for DME Equipment being ordered: Hospital Bed 1 each 0     polyethylene glycol PO packet Take 1 packet by mouth every other day        venlafaxine (EFFEXOR-XR) 37.5 MG 24 hr capsule Take 2 capsules (75 mg) by mouth daily       vitamin B-12 (CYANOCOBALAMIN) 1000 MCG tablet Take 1,000 mcg by mouth daily       REVIEW OF SYSTEMS: 4 point ROS including Respiratory, CV, GI and , other than that noted in the HPI,  is negative  Objective: /61   Pulse 78   Temp 97.2  F (36.2  C)   Resp 16   Wt 84.5 kg (186 lb 4.8 oz)   SpO2 97%   BMI 24.58 kg/m    Limited visit exam done given COVID-19 precautions.   GENERAL APPEARANCE:  Alert, in no distress  ENT:  Mouth and posterior oropharynx normal, moist mucous membranes  EYES:  EOM, conjunctivae, lids, pupils and irises normal  RESP:  no respiratory distress  PSYCH:  oriented to person and place  Labs:   [facility]. Please refer to them using [facility]/Care Everywhere.    ASSESSMENT/PLAN:  Benign Prostatic Hyperplasia. VSS. Patient reports issues with urination do not feel like a UTI. Due to complaints of urinary frequency with incontinence, will re-initiate Tamsulosin 0.4 mg PO daily which was started by PCP on 2/19/20, but not included in hospital discharge medications.      Chronic Atrial Fibrillation. Heart rate WNL. Monitor daily. Continue Metoprolol and Apixaban as ordered.    Physical Deconditioning. Secondary to recent hospitalization for UTI and co-morbidities. Physical and Occupational Therapy ordered.      Electronically signed by:  HOLLI Alexandra CNP     Video-Visit Details  Type of service:  Video Visit  Video End Time (time video stopped): 0884  Distant Location (provider location):  [facility]

## 2020-04-20 NOTE — PROGRESS NOTES
"[facility] DISCHARGE SUMMARY  Bijan Daniel is being evaluated via a billable video visit due to the restrictions of the Covid-19 pandemic.   The patient has been notified of following: \"This video visit will be conducted via a call between you and a [facility] provider. We have found that certain health care needs can be provided without the need for an in-person physical exam.  This service lets us provide the care you need with a video conversation. If during the course of the call the provider feels a video visit is not appropriate, you will not be charged for this service.\"   The provider has received verbal consent for a Video Visit from the patient or family/first contact? Yes  Patient or /facility staff would like the video invitation sent by: N/A   Video Start Time: 0850    PATIENT'S NAME: Bijan Daniel  YOB: 1934  MEDICAL RECORD NUMBER:  [id number]  Place of Location at the time of visit: [facility]  PRIMARY CARE PROVIDER AND CLINIC RESPONSIBLE AFTER TRANSFER:   Oc Randall MD, [address] [place] [id number];  [facility] Provider   Transferring providers: HOLLI Alexandra CNP, Haroldo Crespo MD  Recent Hospitalization/ED:  [facility]  stay 3/24/20 to 3/27/20.  Date of SNF Admission: March / 27 / 2020  Date of SNF (anticipated) Discharge: April / 25 / 2020  Discharged to: previous independent home  Cognitive Scores: SLUMS 23/30  Physical Function: Ambulating up to 500 feet with FWW and SBA-to-CGA. Sit-to-Stand with SBA-to-moderate assistance. CGA-to-minimal assistance with bed mobility. High falls risk. Minimal assistance for upper extremities due to limited shoulder ROM. SBA for lower extremities with assistive device. Moderate assistance with socks and shoes. Sit-to-stand with SBA-to-moderate assistance. Minimal-to-moderate assistance with toileting. Maximum assistance for bowel management. Standing up " "to 7 minutes.  CODE STATUS/ADVANCE DIRECTIVES DISCUSSION:  DNR   ALLERGIES: Nuts and Peanut-derived    DISCHARGE DIAGNOSIS/NURSING FACILITY COURSE:   This is an 85-year-old male, with a past medical history significant for atrial fibrillation on Apixaban, coronary artery disease, CLL, GERD, COPD, MARI on CPAP and depression, who was admitted to [facility] for weakness and headache. A head and neck carotid angiogram on 3/24/20 revealed \"1. Short segment 35% stenosis at the origin of the right ICA 2. Short segment 40% stenosis at the origin of the left ICA\". A head CT revealed \"Presumed chronic microvascular ischemic changes\" with no acute findings. Labs revealed WBC 17.4 and Platelet Count 116. A urine culture revealed > 100,000 CFU/ml Klebsiella pneumoniae. Antibiotics were initiated. Received IV fluids for orthostatic hypotension which was thought to be secondary to infection and poor oral intake. A TCU stay was recommended for ongoing physical rehabilitation.     Klebsiella Pneumoniae Urinary Tract Infection. Completed Cefdinir on 3/31/20.     Benign Prostatic Hypertrophy. Tamsulosin re-initiated on 4/7/20 which was started by PCP on 2/19/20, but not included in hospital discharge orders. Patient continues to report some issues with urinary frequency. No s/s of burning or dysuria. Recommended follow-up with PCP if symptoms persist.     Chronic Atrial Fibrillation. Heart rate 62-89 over the past 5 days. Continue Metoprolol and Apixaban as ordered.     Chronic Lymphocytic Leukemia. Previous Oncology notes indicate observation from 2012. WBC 12.1 and Hemoglobin 12.8 on 3/25/20.      Thrombocytopenia. Platelet Count low 100s during hospital stay. Also present in [facility] records in 2018. Monitor periodically to ensure stability.      Gastroesophageal Reflux Disease. Continue Omeprazole as ordered.     Chronic Obstructive Pulmonary Disease. Requires no medications.     Constipation. Continue Miralax as " ordered.     Depression. Continue Venlafaxine as ordered.     Obstructive Sleep Apnea. Continue CPAP at home settings.      Cognitive Impairment. SLUMS Score 23/30. Will return home with wife and home services.     Chronic Osteomyelitis of Right Shoulder S/P Right Total Shoulder Arthroplasty in 1993. Chronic issue with multiple surgeries. Continue Amoxicillin 250 mg PO daily as ordered.     Physical Deconditioning. Secondary to recent hospitalization and co-morbidities. Home Physical and Occupational Therapy ordered    Past Medical History:  has a past medical history of A-fib (H) and Arthritis. He also has no past medical history of Amblyopia, Complication of anesthesia, Diabetes (H), Diabetic retinopathy associated with diabetes mellitus due to underlying condition (H), Glaucoma (increased eye pressure), Hypertension, Macular degeneration, Malignant hyperthermia, Nonsenile cataract, Retinal detachment, Strabismus, or Uveitis.  Discharge Medications:  Current Outpatient Medications   Medication Sig Dispense Refill     acetaminophen 500 MG CAPS Take 1,000 mg by mouth daily as needed  60 capsule      amoxicillin (AMOXIL) 250 MG capsule Take 1 capsule (250 mg) by mouth daily       Apixaban (ELIQUIS PO) Take 5 mg by mouth 2 times daily       calcium carbonate (CALCIUM CARBONATE) 500 MG tablet Take 2 tablets by mouth daily        metoprolol (LOPRESSOR) 100 MG tablet TAKE 1 TABLET ORALLY TWICE DAILY       omeprazole (PRILOSEC) 20 MG CR capsule Take 2 capsules (40 mg) by mouth daily 30 capsule      polyethylene glycol PO packet Take 1 packet by mouth every other day        tamsulosin (FLOMAX) 0.4 MG capsule Take 0.4 mg by mouth daily       venlafaxine (EFFEXOR-XR) 37.5 MG 24 hr capsule Take 2 capsules (75 mg) by mouth daily       vitamin B-12 (CYANOCOBALAMIN) 1000 MCG tablet Take 1,000 mcg by mouth daily       order for DME Equipment being ordered: Hospital Bed 1 each 0      Medication Changes/Rationale:     See  above  Controlled medications sent with patient:   not applicable/none     ROS: 4 point ROS including Respiratory, CV, GI and , other than that noted in the HPI,  is negative    Physical Exam: Vitals: /63   Pulse 70   Temp 97.6  F (36.4  C)   Resp 16   Wt 86 kg (189 lb 8 oz)   SpO2 99%   BMI 25.00 kg/m   BMI= Body mass index is 25 kg/m .  Limited Visit exam done for COVID-19 precautions  GENERAL APPEARANCE:  Alert, in no distress  ENT:  Mouth and posterior oropharynx normal, moist mucous membranes  EYES:  EOM, conjunctivae, lids, pupils and irises normal  RESP:  no respiratory distress  PSYCH:  oriented to person and place    SNF labs: Labs done in SNF are in [place] [employer]. Please refer to them using [employer] Everywhere.;     DISCHARGE PLAN:    Follow up labs: No labs orders/due    Medical Follow Up:      Follow up with primary care provider in 1 week      Discharge Services: Home Care:  Occupational Therapy, Physical Therapy, Registered Nurse, Home Health Aide and From:  [facility]    TOTAL DISCHARGE TIME:   Greater than 30 minutes  Electronically signed by:  HOLLI Alexandra CNP     Video-Visit Details  Type of service:  Video Visit  Video End Time (time video stopped): 0854  Distant Location (provider location):  [facility]         Documentation of Face-to-Face and Certification for Home Health Services     Patient: Bijan Daniel   YOB: 1934  MR Number: [id number]  Today's Date: 4/22/2020    I certify that patient: Bijan Daniel is under my care and that I, or a nurse practitioner or physician's assistant working with me, had a face-to-face encounter that meets the physician face-to-face encounter requirements with this patient on: 4/21/20.    This encounter with the patient was in whole, or in part, for the following medical condition, which is the primary reason for home health care: UTI.    I certify that, based on my findings, the following  services are medically necessary home health services: Nursing, Occupational Therapy and Physical Therapy.    My clinical findings support the need for the above services because: Nurse is needed: To assess urinary symptoms, education, med set-up after changes in medications or other medical regimen.., Occupational Therapy Services are needed to assess and treat cognitive ability and address ADL safety due to impairment in gait due to physical deconditioning, recent hospitalization for UTI. and Physical Therapy Services are needed to assess and treat the following functional impairments: gait instability, recent hospitalization for UTI.    Further, I certify that my clinical findings support that this patient is homebound (i.e. absences from home require considerable and taxing effort and are for medical reasons or [employer] services or infrequently or of short duration when for other reasons) because: Requires assistance of another person or specialized equipment to access medical services because patient: Requires supervision of another for safe transfer...    Based on the above findings. I certify that this patient is confined to the home and needs intermittent skilled nursing care, physical therapy and/or speech therapy.  The patient is under my care, and I have initiated the establishment of the plan of care.  This patient will be followed by a physician who will periodically review the plan of care.  Physician/Provider to provide follow up care: Oc Randall    Responsible Medicare certified [facility] Physician: Dr. Haroldo Crespo  Physician Signature: See electronic signature associated with these discharge orders.  Date: 4/22/2020

## 2020-04-21 ENCOUNTER — VIRTUAL VISIT (OUTPATIENT)
Dept: GERIATRICS | Facility: CLINIC | Age: 85
End: 2020-04-21
Payer: MEDICARE

## 2020-04-21 VITALS
DIASTOLIC BLOOD PRESSURE: 63 MMHG | BODY MASS INDEX: 25 KG/M2 | OXYGEN SATURATION: 99 % | WEIGHT: 189.5 LBS | SYSTOLIC BLOOD PRESSURE: 128 MMHG | TEMPERATURE: 97.6 F | HEART RATE: 70 BPM | RESPIRATION RATE: 16 BRPM

## 2020-04-21 DIAGNOSIS — D69.6 THROMBOCYTOPENIA (H): ICD-10-CM

## 2020-04-21 DIAGNOSIS — F32.1 MODERATE MAJOR DEPRESSION (H): ICD-10-CM

## 2020-04-21 DIAGNOSIS — C91.10 CHRONIC LYMPHOCYTIC LEUKEMIA (H): ICD-10-CM

## 2020-04-21 DIAGNOSIS — J44.9 CHRONIC OBSTRUCTIVE PULMONARY DISEASE, UNSPECIFIED COPD TYPE (H): ICD-10-CM

## 2020-04-21 DIAGNOSIS — I48.20 CHRONIC ATRIAL FIBRILLATION (H): ICD-10-CM

## 2020-04-21 DIAGNOSIS — G47.33 OSA (OBSTRUCTIVE SLEEP APNEA): Primary | ICD-10-CM

## 2020-04-21 DIAGNOSIS — E53.8 VITAMIN B12 DEFICIENCY (NON ANEMIC): ICD-10-CM

## 2020-04-21 PROCEDURE — 99316 NF DSCHRG MGMT 30 MIN+: CPT | Mod: 95 | Performed by: NURSE PRACTITIONER

## 2020-04-21 NOTE — LETTER
"    4/21/2020        RE: Bijan Daniel  [address] [place] [address] [address]  [phone]  [facility] [address]        [facility] DISCHARGE SUMMARY  Bijan Daniel is being evaluated via a billable video visit due to the restrictions of the Covid-19 pandemic.   The patient has been notified of following: \"This video visit will be conducted via a call between you and a [facility] provider. We have found that certain health care needs can be provided without the need for an in-person physical exam.  This service lets us provide the care you need with a video conversation. If during the course of the call the provider feels a video visit is not appropriate, you will not be charged for this service.\"   The provider has received verbal consent for a Video Visit from the patient or family/first contact? Yes  Patient or /facility staff would like the video invitation sent by: N/A   Video Start Time: 0850    PATIENT'S NAME: Bijan Daniel  YOB: 1934  MEDICAL RECORD NUMBER:  [id number]  Place of Location at the time of visit: [facility]  PRIMARY CARE PROVIDER AND CLINIC RESPONSIBLE AFTER TRANSFER:   Oc Randall MD, [address] [facility] [id number];  [facility]   Transferring providers: HOLLI Alexandra CNP, Haroldo Crespo MD  Recent Hospitalization/ED:  [facility]  stay 3/24/20 to 3/27/20.  Date of SNF Admission: March / 27 / 2020  Date of SNF (anticipated) Discharge: April / 25 / 2020  Discharged to: previous independent home  Cognitive Scores: SLUMS 23/30  Physical Function: Ambulating up to 500 feet with FWW and SBA-to-CGA. Sit-to-Stand with SBA-to-moderate assistance. CGA-to-minimal assistance with bed mobility. High falls risk. Minimal assistance for upper extremities due to limited shoulder ROM. SBA for lower extremities with assistive device. Moderate assistance with socks and shoes. Sit-to-stand with SBA-to-moderate " "assistance. Minimal-to-moderate assistance with toileting. Maximum assistance for bowel management. Standing up to 7 minutes.  CODE STATUS/ADVANCE DIRECTIVES DISCUSSION:  DNR   ALLERGIES: Nuts and Peanut-derived    DISCHARGE DIAGNOSIS/NURSING FACILITY COURSE:   This is an 85-year-old male, with a past medical history significant for atrial fibrillation on Apixaban, coronary artery disease, CLL, GERD, COPD, MARI on CPAP and depression, who was admitted to [facility] for weakness and headache. A head and neck carotid angiogram on 3/24/20 revealed \"1. Short segment 35% stenosis at the origin of the right ICA 2. Short segment 40% stenosis at the origin of the left ICA\". A head CT revealed \"Presumed chronic microvascular ischemic changes\" with no acute findings. Labs revealed WBC 17.4 and Platelet Count 116. A urine culture revealed > 100,000 CFU/ml Klebsiella pneumoniae. Antibiotics were initiated. Received IV fluids for orthostatic hypotension which was thought to be secondary to infection and poor oral intake. A TCU stay was recommended for ongoing physical rehabilitation.     Klebsiella Pneumoniae Urinary Tract Infection. Completed Cefdinir on 3/31/20.     Benign Prostatic Hypertrophy. Tamsulosin re-initiated on 4/7/20 which was started by PCP on 2/19/20, but not included in hospital discharge orders. Patient continues to report some issues with urinary frequency. No s/s of burning or dysuria. Recommended follow-up with PCP if symptoms persist.     Chronic Atrial Fibrillation. Heart rate 62-89 over the past 5 days. Continue Metoprolol and Apixaban as ordered.     Chronic Lymphocytic Leukemia. Previous Oncology notes indicate observation from 2012. WBC 12.1 and Hemoglobin 12.8 on 3/25/20.      Thrombocytopenia. Platelet Count low 100s during hospital stay. Also present in [facility] records in 2018. Monitor periodically to ensure stability.      Gastroesophageal Reflux Disease. Continue Omeprazole as " ordered.     Chronic Obstructive Pulmonary Disease. Requires no medications.     Constipation. Continue Miralax as ordered.     Depression. Continue Venlafaxine as ordered.     Obstructive Sleep Apnea. Continue CPAP at home settings.      Cognitive Impairment. SLUMS Score 23/30. Will return home with wife and home services.     Chronic Osteomyelitis of Right Shoulder S/P Right Total Shoulder Arthroplasty in 1993. Chronic issue with multiple surgeries. Continue Amoxicillin 250 mg PO daily as ordered.     Physical Deconditioning. Secondary to recent hospitalization and co-morbidities. Home Physical and Occupational Therapy ordered    Past Medical History:  has a past medical history of A-fib (H) and Arthritis. He also has no past medical history of Amblyopia, Complication of anesthesia, Diabetes (H), Diabetic retinopathy associated with diabetes mellitus due to underlying condition (H), Glaucoma (increased eye pressure), Hypertension, Macular degeneration, Malignant hyperthermia, Nonsenile cataract, Retinal detachment, Strabismus, or Uveitis.  Discharge Medications:  Current Outpatient Medications   Medication Sig Dispense Refill     acetaminophen 500 MG CAPS Take 1,000 mg by mouth daily as needed  60 capsule      amoxicillin (AMOXIL) 250 MG capsule Take 1 capsule (250 mg) by mouth daily       Apixaban (ELIQUIS PO) Take 5 mg by mouth 2 times daily       calcium carbonate (CALCIUM CARBONATE) 500 MG tablet Take 2 tablets by mouth daily        metoprolol (LOPRESSOR) 100 MG tablet TAKE 1 TABLET ORALLY TWICE DAILY       omeprazole (PRILOSEC) 20 MG CR capsule Take 2 capsules (40 mg) by mouth daily 30 capsule      polyethylene glycol PO packet Take 1 packet by mouth every other day        tamsulosin (FLOMAX) 0.4 MG capsule Take 0.4 mg by mouth daily       venlafaxine (EFFEXOR-XR) 37.5 MG 24 hr capsule Take 2 capsules (75 mg) by mouth daily       vitamin B-12 (CYANOCOBALAMIN) 1000 MCG tablet Take 1,000 mcg by mouth daily        order for DME Equipment being ordered: Hospital Bed 1 each 0      Medication Changes/Rationale:     See above  Controlled medications sent with patient:   not applicable/none     ROS: 4 point ROS including Respiratory, CV, GI and , other than that noted in the HPI,  is negative    Physical Exam: Vitals: /63   Pulse 70   Temp 97.6  F (36.4  C)   Resp 16   Wt 86 kg (189 lb 8 oz)   SpO2 99%   BMI 25.00 kg/m   BMI= Body mass index is 25 kg/m .  Limited Visit exam done for COVID-19 precautions  GENERAL APPEARANCE:  Alert, in no distress  ENT:  Mouth and posterior oropharynx normal, moist mucous membranes  EYES:  EOM, conjunctivae, lids, pupils and irises normal  RESP:  no respiratory distress  PSYCH:  oriented to person and place    SNF labs: Labs done in SNF are in [place] [employer]. Please refer to them using [employer] Everywhere.;     DISCHARGE PLAN:    Follow up labs: No labs orders/due    Medical Follow Up:      Follow up with primary care provider in 1 week      Discharge Services: Home Care:  Occupational Therapy, Physical Therapy, Registered Nurse, Home Health Aide and From:  [facility]    TOTAL DISCHARGE TIME:   Greater than 30 minutes  Electronically signed by:  HOLLI Alexandra CNP     Video-Visit Details  Type of service:  Video Visit  Video End Time (time video stopped): 0854  Distant Location (provider location):  [facility]         Documentation of Face-to-Face and Certification for Home Health Services     Patient: Bijan Daniel   YOB: 1934  MR Number: [id number]  Today's Date: 4/22/2020    I certify that patient: Bijan Daniel is under my care and that I, or a nurse practitioner or physician's assistant working with me, had a face-to-face encounter that meets the physician face-to-face encounter requirements with this patient on: 4/21/20.    This encounter with the patient was in whole, or in part, for the following medical condition,  which is the primary reason for home health care: UTI.    I certify that, based on my findings, the following services are medically necessary home health services: Nursing, Occupational Therapy and Physical Therapy.    My clinical findings support the need for the above services because: Nurse is needed: To assess urinary symptoms, education, med set-up after changes in medications or other medical regimen.., Occupational Therapy Services are needed to assess and treat cognitive ability and address ADL safety due to impairment in gait due to physical deconditioning, recent hospitalization for UTI. and Physical Therapy Services are needed to assess and treat the following functional impairments: gait instability, recent hospitalization for UTI.    Further, I certify that my clinical findings support that this patient is homebound (i.e. absences from home require considerable and taxing effort and are for medical reasons or [employer] services or infrequently or of short duration when for other reasons) because: Requires assistance of another person or specialized equipment to access medical services because patient: Requires supervision of another for safe transfer...    Based on the above findings. I certify that this patient is confined to the home and needs intermittent skilled nursing care, physical therapy and/or speech therapy.  The patient is under my care, and I have initiated the establishment of the plan of care.  This patient will be followed by a physician who will periodically review the plan of care.  Physician/Provider to provide follow up care: Oc Randall    Responsible Medicare certified [facility] Physician: Dr. Haroldo Crespo  Physician Signature: See electronic signature associated with these discharge orders.  Date: 4/22/2020              Sincerely,        HOLLI Alexandra CNP

## 2020-04-22 ENCOUNTER — TELEPHONE (OUTPATIENT)
Dept: FAMILY MEDICINE | Facility: CLINIC | Age: 85
End: 2020-04-22

## 2020-04-22 NOTE — TELEPHONE ENCOUNTER
Pt and wife called back and spoke with them and informed them of the message below.  Toña Cordero MA

## 2020-04-22 NOTE — TELEPHONE ENCOUNTER
Right now, we do not have the ability to test for exposure.  However, we may have access to the antibiodies within the next month or so.

## 2020-04-22 NOTE — TELEPHONE ENCOUNTER
Reason for Call:  Other call back    Detailed comments: Patient will be discharged from [facility] on Friday, 4/24. His wife, Marlyn, is wondering if there is a way that patient can be tested for covid after being discharged    Phone Number Patient can be reached at: Home number on file [phone] (Marlyn, Wife)    Best Time: anytime    Can we leave a detailed message on this number? YES    Call taken on 4/22/2020 at 9:57 AM by Cedric Jiang

## 2020-11-08 ENCOUNTER — NURSE TRIAGE (OUTPATIENT)
Dept: NURSING | Facility: CLINIC | Age: 85
End: 2020-11-08

## 2020-11-08 DIAGNOSIS — R30.0 DYSURIA: Primary | ICD-10-CM

## 2020-11-08 NOTE — TELEPHONE ENCOUNTER
S: Weakness unsteady gait.  B: Wife calling she has expressed the below concerns    Noticed a change in his walking.  He is shuffling his feet more when walking with walker.       If PT at home may help    Wondering if Bijan may have a UTI. Could she drop a urine sample on Monday    Bijan has told her his feel more depressed.  Is taking Effexor 75mg..  Can this be increase?    [facility]    Needs a flu shot    B/P  122-125 / 76-7.   No fever    A: Writer will send note to PCP.  Pool [id number].  R:  Wife will call clinic on Monday to request a UA/UC order.  Advised wife if Bijan becomes unable to walk she will need to call the ambulance.    Aleta Balderas RN MAN   Triage Nurse Advisor [facility]      Additional Information    Negative: Severe difficulty breathing (e.g., struggling for each breath, speaks in single words)    Negative: Shock suspected (e.g., cold/pale/clammy skin, too weak to stand, low BP, rapid pulse)    Negative: Difficult to awaken or acting confused (e.g., disoriented, slurred speech)    Negative: [1] Fainted > 15 minutes ago AND [2] still feels too weak or dizzy to stand    Negative: [1] SEVERE weakness (i.e., unable to walk or barely able to walk, requires support) AND     [2] new onset or worsening    Negative: Sounds like a life-threatening emergency to the triager    Negative: [1] Drinking very little AND [2] dehydration suspected (e.g., no urine > 12 hours, very dry mouth, very lightheaded)    Negative: Patient sounds very sick or weak to the triager    Negative: [1] MODERATE weakness (i.e., interferes with work, school, normal activities) AND [2] cause unknown  (Exceptions: weakness with acute minor illness, or weakness from poor fluid intake)    Negative: [1] MODERATE weakness AND [2] from poor fluid intake AND [3] no improvement after 2 hours of rest and fluids    Negative: [1] Fever > 103 F (39.4 C) AND [2] not able to get the fever down using Fever Care  Advice    Negative: [1] Fever > 101 F (38.3 C) AND [2] age > 60    Negative: [1] Fever > 100.0 F (37.8 C) AND [2] bedridden (e.g., nursing home patient, CVA, chronic illness, recovering from surgery)    Negative: [1] Fever > 100.0 F (37.8 C) AND [2] diabetes mellitus or weak immune system (e.g., HIV positive, cancer chemo, splenectomy, organ transplant, chronic steroids)    Negative: Pale skin (pallor)    [1] MODERATE weakness (i.e., interferes with work, school, normal activities) AND [2] persists > 3 days    Protocols used: WEAKNESS (GENERALIZED) AND FATIGUE-A-AH    COVID 19 Nurse Triage Plan/Patient Instructions    Please be aware that novel coronavirus (COVID-19) may be circulating in the community. If you develop symptoms such as fever, cough, or SOB or if you have concerns about the presence of another infection including coronavirus (COVID-19), please contact your health care provider or visit [email or web address].     Disposition/Instructions    Virtual Visit with provider recommended. Reference Visit Selection Guide.    Thank you for taking steps to prevent the spread of this virus.  o Limit your contact with others.  o Wear a simple mask to cover your cough.  o Wash your hands well and often.    Resources    [facility]: About COVID-19: [email or web address]    CDC: What to Do If You're Sick: www.cdc.gov/coronavirus/2019-ncov/about/steps-when-sick.html    CDC: Ending Home Isolation: www.cdc.gov/coronavirus/2019-ncov/hcp/disposition-in-home-patients.html     CDC: Caring for Someone: www.cdc.gov/coronavirus/2019-ncov/if-you-are-sick/care-for-someone.html     [facility]: Interim Guidance for Hospital Discharge to Home: www.health.[facility]/diseases/coronavirus/hcp/hospdischarge.pdf    [facility] clinical trials (COVID-19 research studies): [email or web address]     Below are the COVID-19 hotlines at the [place] [facility] ([facility]). Interpreters are available.    o For health questions: Call 158-886-4838 or 1-907.955.4340 (7 a.m. to 7 p.m.)  o For questions about schools and childcare: Call 575-132-5021 or 1-294.509.8627 (7 a.m. to 7 p.m.)

## 2020-11-09 NOTE — TELEPHONE ENCOUNTER
I'll order the urine today    For the flu shot and assessment of the gait,  I would recommend an appointment with me - or [facility] if [facility]   But, yes, the effexor can be increased from the current dose

## 2020-11-11 ENCOUNTER — RECORDS - HEALTHEAST (OUTPATIENT)
Dept: LAB | Facility: CLINIC | Age: 85
End: 2020-11-11

## 2020-11-12 ENCOUNTER — RECORDS - HEALTHEAST (OUTPATIENT)
Dept: LAB | Facility: CLINIC | Age: 85
End: 2020-11-12

## 2020-11-13 LAB
ALBUMIN SERPL-MCNC: 3 G/DL (ref 3.5–5)
ALP SERPL-CCNC: 122 U/L (ref 45–120)
ALT SERPL W P-5'-P-CCNC: 11 U/L (ref 0–45)
ANION GAP SERPL CALCULATED.3IONS-SCNC: 7 MMOL/L (ref 5–18)
AST SERPL W P-5'-P-CCNC: 29 U/L (ref 0–40)
BILIRUB SERPL-MCNC: 1.6 MG/DL (ref 0–1)
BUN SERPL-MCNC: 13 MG/DL (ref 8–28)
CALCIUM SERPL-MCNC: 8.5 MG/DL (ref 8.5–10.5)
CHLORIDE BLD-SCNC: 105 MMOL/L (ref 98–107)
CO2 SERPL-SCNC: 23 MMOL/L (ref 22–31)
CREAT SERPL-MCNC: 0.81 MG/DL (ref 0.7–1.3)
GFR SERPL CREATININE-BSD FRML MDRD: >60 ML/MIN/1.73M2
GLUCOSE BLD-MCNC: 57 MG/DL (ref 70–125)
POTASSIUM BLD-SCNC: 4.1 MMOL/L (ref 3.5–5)
PROT SERPL-MCNC: 5.7 G/DL (ref 6–8)
SODIUM SERPL-SCNC: 135 MMOL/L (ref 136–145)

## 2020-11-16 LAB
BASOPHILS # BLD AUTO: 0 THOU/UL (ref 0–0.2)
BASOPHILS NFR BLD AUTO: 1 % (ref 0–2)
EOSINOPHIL COUNT (ABSOLUTE): 0.1 THOU/UL (ref 0–0.4)
EOSINOPHIL NFR BLD AUTO: 1 % (ref 0–6)
ERYTHROCYTE [DISTWIDTH] IN BLOOD BY AUTOMATED COUNT: 14.5 % (ref 11–14.5)
HCT VFR BLD AUTO: 37.7 % (ref 40–54)
HGB BLD-MCNC: 12.6 G/DL (ref 14–18)
IMMATURE GRANULOCYTE % - MAN (DIFF): 0 %
IMMATURE GRANULOCYTE ABSOLUTE - MAN (DIFF): 0 THOU/UL
LYMPHOCYTES # BLD AUTO: 6.3 THOU/UL (ref 0.8–4.4)
LYMPHOCYTES NFR BLD AUTO: 77 % (ref 20–40)
MCH RBC QN AUTO: 32.5 PG (ref 27–34)
MCHC RBC AUTO-ENTMCNC: 33.4 G/DL (ref 32–36)
MCV RBC AUTO: 97 FL (ref 80–100)
MONOCYTES # BLD AUTO: 0.5 THOU/UL (ref 0–0.9)
MONOCYTES NFR BLD AUTO: 6 % (ref 2–10)
OVALOCYTES: ABNORMAL
PLAT MORPH BLD: NORMAL
PLATELET # BLD AUTO: 142 THOU/UL (ref 140–440)
PMV BLD AUTO: 10.1 FL (ref 8.5–12.5)
POLYCHROMASIA BLD QL SMEAR: ABNORMAL
RBC # BLD AUTO: 3.88 MILL/UL (ref 4.4–6.2)
TOTAL NEUTROPHILS-ABS(DIFF): 1.3 THOU/UL (ref 2–7.7)
TOTAL NEUTROPHILS-REL(DIFF): 16 % (ref 50–70)
WBC: 8.2 THOU/UL (ref 4–11)

## 2020-11-18 LAB
GAMMA INTERFERON BACKGROUND BLD IA-ACNC: 0.02 IU/ML
M TB IFN-G BLD-IMP: NEGATIVE
MITOGEN IGNF BCKGRD COR BLD-ACNC: 0.02 IU/ML
MITOGEN IGNF BCKGRD COR BLD-ACNC: 0.05 IU/ML
QTF INTERPRETATION: NORMAL
QTF MITOGEN - NIL: 7.33 IU/ML

## 2021-01-15 ENCOUNTER — HEALTH MAINTENANCE LETTER (OUTPATIENT)
Age: 86
End: 2021-01-15

## 2021-02-17 ENCOUNTER — RECORDS - HEALTHEAST (OUTPATIENT)
Dept: LAB | Facility: CLINIC | Age: 86
End: 2021-02-17

## 2021-02-18 ENCOUNTER — RECORDS - HEALTHEAST (OUTPATIENT)
Dept: LAB | Facility: CLINIC | Age: 86
End: 2021-02-18

## 2021-02-18 LAB
ANION GAP SERPL CALCULATED.3IONS-SCNC: 6 MMOL/L (ref 5–18)
BUN SERPL-MCNC: 14 MG/DL (ref 8–28)
CALCIUM SERPL-MCNC: 8.2 MG/DL (ref 8.5–10.5)
CHLORIDE BLD-SCNC: 107 MMOL/L (ref 98–107)
CO2 SERPL-SCNC: 24 MMOL/L (ref 22–31)
CREAT SERPL-MCNC: 0.69 MG/DL (ref 0.7–1.3)
GFR SERPL CREATININE-BSD FRML MDRD: >60 ML/MIN/1.73M2
GLUCOSE BLD-MCNC: 71 MG/DL (ref 70–125)
HGB BLD-MCNC: 11.3 G/DL (ref 14–18)
POTASSIUM BLD-SCNC: 4 MMOL/L (ref 3.5–5)
SODIUM SERPL-SCNC: 137 MMOL/L (ref 136–145)
TSH SERPL DL<=0.005 MIU/L-ACNC: 7.22 UIU/ML (ref 0.3–5)
VIT B12 SERPL-MCNC: 1123 PG/ML (ref 213–816)

## 2021-02-19 LAB
T3FREE SERPL-MCNC: 2.2 PG/ML (ref 1.9–3.9)
T4 FREE SERPL-MCNC: 0.8 NG/DL (ref 0.7–1.8)

## 2021-02-23 ENCOUNTER — RECORDS - HEALTHEAST (OUTPATIENT)
Dept: LAB | Facility: CLINIC | Age: 86
End: 2021-02-23

## 2021-02-24 LAB
ANION GAP SERPL CALCULATED.3IONS-SCNC: 8 MMOL/L (ref 5–18)
BUN SERPL-MCNC: 16 MG/DL (ref 8–28)
CALCIUM SERPL-MCNC: 8.1 MG/DL (ref 8.5–10.5)
CHLORIDE BLD-SCNC: 106 MMOL/L (ref 98–107)
CO2 SERPL-SCNC: 25 MMOL/L (ref 22–31)
CREAT SERPL-MCNC: 0.73 MG/DL (ref 0.7–1.3)
ERYTHROCYTE [DISTWIDTH] IN BLOOD BY AUTOMATED COUNT: 13.8 % (ref 11–14.5)
GFR SERPL CREATININE-BSD FRML MDRD: >60 ML/MIN/1.73M2
GLUCOSE BLD-MCNC: 108 MG/DL (ref 70–125)
HCT VFR BLD AUTO: 33.8 % (ref 40–54)
HGB BLD-MCNC: 11.2 G/DL (ref 14–18)
MCH RBC QN AUTO: 32 PG (ref 27–34)
MCHC RBC AUTO-ENTMCNC: 33.1 G/DL (ref 32–36)
MCV RBC AUTO: 97 FL (ref 80–100)
PLATELET # BLD AUTO: 143 THOU/UL (ref 140–440)
PMV BLD AUTO: 10.4 FL (ref 8.5–12.5)
POTASSIUM BLD-SCNC: 3.6 MMOL/L (ref 3.5–5)
RBC # BLD AUTO: 3.5 MILL/UL (ref 4.4–6.2)
SODIUM SERPL-SCNC: 139 MMOL/L (ref 136–145)
WBC: 9.2 THOU/UL (ref 4–11)

## 2021-09-04 ENCOUNTER — HEALTH MAINTENANCE LETTER (OUTPATIENT)
Age: 86
End: 2021-09-04

## 2021-10-19 PROBLEM — F32.9 MAJOR DEPRESSION: Status: ACTIVE | Noted: 2018-03-07

## 2022-02-19 ENCOUNTER — HEALTH MAINTENANCE LETTER (OUTPATIENT)
Age: 87
End: 2022-02-19

## 2022-10-16 ENCOUNTER — HEALTH MAINTENANCE LETTER (OUTPATIENT)
Age: 87
End: 2022-10-16

## 2023-04-01 ENCOUNTER — HEALTH MAINTENANCE LETTER (OUTPATIENT)
Age: 88
End: 2023-04-01